# Patient Record
Sex: MALE | Race: WHITE | Employment: UNEMPLOYED | URBAN - METROPOLITAN AREA
[De-identification: names, ages, dates, MRNs, and addresses within clinical notes are randomized per-mention and may not be internally consistent; named-entity substitution may affect disease eponyms.]

---

## 2017-05-04 ENCOUNTER — APPOINTMENT (OUTPATIENT)
Dept: GENERAL RADIOLOGY | Age: 57
End: 2017-05-04
Attending: EMERGENCY MEDICINE
Payer: MEDICAID

## 2017-05-04 ENCOUNTER — HOSPITAL ENCOUNTER (EMERGENCY)
Age: 57
Discharge: HOME | End: 2017-05-04
Attending: EMERGENCY MEDICINE
Payer: MEDICAID

## 2017-05-04 VITALS
HEIGHT: 67 IN | BODY MASS INDEX: 26.68 KG/M2 | OXYGEN SATURATION: 99 % | SYSTOLIC BLOOD PRESSURE: 108 MMHG | TEMPERATURE: 98.1 F | HEART RATE: 65 BPM | RESPIRATION RATE: 18 BRPM | DIASTOLIC BLOOD PRESSURE: 69 MMHG | WEIGHT: 170 LBS

## 2017-05-04 DIAGNOSIS — M25.471 EDEMA OF RIGHT ANKLE: Primary | ICD-10-CM

## 2017-05-04 PROCEDURE — 73630 X-RAY EXAM OF FOOT: CPT

## 2017-05-04 PROCEDURE — 99283 EMERGENCY DEPT VISIT LOW MDM: CPT

## 2017-05-04 PROCEDURE — 73610 X-RAY EXAM OF ANKLE: CPT

## 2017-05-04 RX ORDER — CEPHALEXIN 500 MG/1
500 CAPSULE ORAL 2 TIMES DAILY
COMMUNITY
End: 2017-06-17

## 2017-05-04 NOTE — DISCHARGE INSTRUCTIONS
Foot Pain: Care Instructions  Your Care Instructions  Foot injuries that cause pain and swelling are fairly common. Almost all sports or home repair projects can cause a misstep that ends up as foot pain. Normal wear and tear, especially as you get older, also can cause foot pain. Most minor foot injuries will heal on their own, and home treatment is usually all you need to do. If you have a severe injury, you may need tests and treatment. Follow-up care is a key part of your treatment and safety. Be sure to make and go to all appointments, and call your doctor if you are having problems. Its also a good idea to know your test results and keep a list of the medicines you take. How can you care for yourself at home? · Take pain medicines exactly as directed. ¨ If the doctor gave you a prescription medicine for pain, take it as prescribed. ¨ If you are not taking a prescription pain medicine, ask your doctor if you can take an over-the-counter medicine. · Rest and protect your foot. Take a break from any activity that may cause pain. · Put ice or a cold pack on your foot for 10 to 20 minutes at a time. Put a thin cloth between the ice and your skin. · Prop up the sore foot on a pillow when you ice it or anytime you sit or lie down during the next 3 days. Try to keep it above the level of your heart. This will help reduce swelling. · Your doctor may recommend that you wrap your foot with an elastic bandage. Keep your foot wrapped for as long as your doctor advises. · If your doctor recommends crutches, use them as directed. · Wear roomy footwear. · As soon as pain and swelling end, begin gentle exercises of your foot. Your doctor can tell you which exercises will help. When should you call for help? Call 911 anytime you think you may need emergency care. For example, call if:  · Your foot turns pale, white, blue, or cold.   Call your doctor now or seek immediate medical care if:  · You cannot move or stand on your foot. · Your foot looks twisted or out of its normal position. · Your foot is not stable when you step down. · You have signs of infection, such as:  ¨ Increased pain, swelling, warmth, or redness. ¨ Red streaks leading from the sore area. ¨ Pus draining from a place on your foot. ¨ A fever. · Your foot is numb or tingly. Watch closely for changes in your health, and be sure to contact your doctor if:  · You do not get better as expected. · You have bruises from an injury that last longer than 2 weeks. Where can you learn more? Go to http://negrita-edie.info/. Enter C597 in the search box to learn more about \"Foot Pain: Care Instructions. \"  Current as of: May 23, 2016  Content Version: 11.2  © 9224-9850 [employer]. Care instructions adapted under license by [employer] (which disclaims liability or warranty for this information). If you have questions about a medical condition or this instruction, always ask your healthcare professional. [place] [address] any warranty or liability for your use of this information. We hope that we have addressed all of your medical concerns. The examination and treatment you received in the Emergency Department were for an emergent problem and were not intended as complete care. It is important that you follow up with your healthcare provider(s) for ongoing care. If your symptoms worsen or do not improve as expected, and you are unable to reach your usual health care provider(s), you should return to the Emergency Department. Today's healthcare is undergoing tremendous change, and patient satisfaction surveys are one of the many tools to assess the quality of medical care. You may receive a survey from the [employer] regarding your experience in the Emergency Department.   I hope that your experience has been completely positive, particularly the medical care that I provided. As such, please participate in the survey; anything less than excellent does not meet my expectations or intentions. [facility] and [facility] participate in nationally recognized quality of care measures. If your blood pressure is greater than 120/80, as reported below, we urge that you seek medical care to address the potential of high blood pressure, commonly known as hypertension. Hypertension can be hereditary or can be caused by certain medical conditions, pain, stress, or \"white coat syndrome. \"       Please make an appointment with your health care provider(s) for follow up of your Emergency Department visit. VITALS:   Patient Vitals for the past 8 hrs:   Temp Pulse Resp BP SpO2   05/04/17 1018 98.1 °F (36.7 °C) 65 18 108/69 99 %          Thank you for allowing us to provide you with medical care today. We realize that you have many choices for your emergency care needs. Please choose us in the future for any continued health care needs. [facility], [address]: [phone]            No results found for this or any previous visit (from the past 24 hour(s)). Xr Ankle Rt Min 3 V    Result Date: 5/4/2017  EXAM:  XR ANKLE RT MIN 3 V INDICATION:  fall, swelling. Right ankle injury COMPARISON: 5/11/2014. FINDINGS: Three views of the right ankle demonstrate no fracture or disruption of the ankle mortise. There is no other acute osseous or articular abnormality. The soft tissues are within normal limits. IMPRESSION: No acute abnormality. Xr Foot Rt Min 3 V    Result Date: 5/4/2017  EXAM:  XR FOOT RT MIN 3 V INDICATION:   foot pain and swelling after fall. COMPARISON:  8/30/2016 FINDINGS:  Three views of the right foot demonstrate no fracture or other acute osseous or articular abnormality. There is generalized soft tissue swelling.  There are no radiopaque foreign bodies. IMPRESSION:  No acute abnormality.

## 2017-05-04 NOTE — ED PROVIDER NOTES
Patient is a 60 y.o. male presenting with foot injury. Foot Injury       59 yo WM presents with caregiver after fall last week (4/27). Was seen at [facility], had xrays and all normal. Followed up with PCP on 5/1 and started on antibiotic for prophylaxis for infection from wounds (keflex). C/o swelling to right foot, noticed today. Pt is not able to communicate symptoms of pain. Walking like normal, doesn't seem to have pain in right ankle/foot. No other complaints. Past Medical History:   Diagnosis Date    Developmental delay     Endocrine disease     Neurological disorder     CP    Psychiatric disorder     Seizures ([address]        History reviewed. No pertinent surgical history. History reviewed. No pertinent family history. Social History     Social History    Marital status: SINGLE     Spouse name: N/A    Number of children: N/A    Years of education: N/A     Occupational History    Not on file. Social History Main Topics    Smoking status: Never Smoker    Smokeless tobacco: Not on file    Alcohol use No    Drug use: Yes     Special: Prescription    Sexual activity: Not on file     Other Topics Concern    Not on file     Social History Narrative         ALLERGIES: Review of patient's allergies indicates no known allergies. Review of Systems   Constitutional: Negative for chills and fever. Respiratory: Negative for cough and shortness of breath. Gastrointestinal: Negative for diarrhea and vomiting. Musculoskeletal: Positive for joint swelling. Skin: Positive for rash and wound. All other systems reviewed and are negative.       Vitals:    05/04/17 1018   BP: 108/69   Pulse: 65   Resp: 18   Temp: 98.1 °F (36.7 °C)   SpO2: 99%   Weight: 77.1 kg (170 lb)   Height: 5' 7\" (1.702 m)            Physical Exam   Physical Examination: General appearance - alert, baseline mental status, nonverbal  Eyes - pupils equal and reactive, extraocular eye movements intact  HEENT-bruising to chin  Neck - supple, no significant adenopathy  Chest - clear to auscultation, no wheezes, rales or rhonchi, symmetric air entry  Heart - normal rate, regular rhythm, normal S1, S2, no murmurs, rubs, clicks or gallops  Abdomen - soft, nontender, nondistended, no masses or organomegaly  Back exam - full range of motion, no tenderness, palpable spasm or pain on motion  Neurological - alert, baseline mental status, nonverbal  Musculoskeletal - no joint tenderness, deformity or swelling, knee stable, mild swelling to right ankle, no erythema or tenderness, full rom  Extremities - peripheral pulses normal, no pedal edema, no clubbing or cyanosis  Skin - normal coloration and turgor, no rashes, no suspicious skin lesions noted, well healing skin tear to right anterior knee with dressing in place, c/d/i, no erythema or drainage  MDM  Number of Diagnoses or Management Options  Edema of right ankle:      Amount and/or Complexity of Data Reviewed  Tests in the radiology section of CPT®: ordered and reviewed  Decide to obtain previous medical records or to obtain history from someone other than the patient: yes  Obtain history from someone other than the patient: yes (caregiver)  Review and summarize past medical records: yes  Independent visualization of images, tracings, or specimens: yes    Patient Progress  Patient progress: improved    ED Course       Procedures    Pt with recent knee injury (xray from [facility] normal), now a with nontender right ankle swelling. No calf swelling or tenderness Suspect dependent edema. VSS. Will d/c with pcp f/u.

## 2017-05-04 NOTE — ED TRIAGE NOTES
Per caretaker pt has history of falls and had a significant fall on 4/27. Pt sent at [place] [address] ED with right knee pain and chin abrasion. The pt's exam and xrays were neg. On 5/1 pt went to pcp for right foot swelling and was put on Keflex for precautions. Sent for re eval of right foot from group home.

## 2017-05-17 ENCOUNTER — OFFICE VISIT (OUTPATIENT)
Dept: NEUROLOGY | Age: 57
End: 2017-05-17

## 2017-05-17 VITALS
DIASTOLIC BLOOD PRESSURE: 70 MMHG | HEIGHT: 67 IN | SYSTOLIC BLOOD PRESSURE: 120 MMHG | RESPIRATION RATE: 20 BRPM | WEIGHT: 170 LBS | BODY MASS INDEX: 26.68 KG/M2

## 2017-05-17 DIAGNOSIS — G40.209 PARTIAL EPILEPSY WITH IMPAIRMENT OF CONSCIOUSNESS (HCC): Primary | ICD-10-CM

## 2017-05-17 RX ORDER — DIVALPROEX SODIUM 500 MG/1
TABLET, EXTENDED RELEASE ORAL
Qty: 90 TAB | Refills: 5 | Status: SHIPPED | OUTPATIENT
Start: 2017-05-17 | End: 2017-11-15 | Stop reason: SDUPTHER

## 2017-05-17 RX ORDER — CARBAMAZEPINE 200 MG/1
TABLET, EXTENDED RELEASE ORAL
Qty: 60 TAB | Refills: 5 | Status: SHIPPED | OUTPATIENT
Start: 2017-05-17 | End: 2017-11-15 | Stop reason: SDUPTHER

## 2017-05-17 NOTE — MR AVS SNAPSHOT
Visit Information Date & Time Provider Department Dept. Phone Encounter #  
 5/17/2017  9:00 AM Frank Thompson NP Neurology [facility] [phone] [id number] Follow-up Instructions Return in about 6 months (around 11/17/2017). Upcoming Health Maintenance Date Due Hepatitis C Screening 1960 FOBT Q 1 YEAR AGE 50-75 7/8/2008 INFLUENZA AGE 9 TO ADULT 8/1/2017 DTaP/Tdap/Td series (2 - Td) 1/13/2026 Allergies as of 5/17/2017  Review Complete On: 5/17/2017 By: Fara Cast LPN No Known Allergies Current Immunizations  Never Reviewed Name Date Tdap 1/13/2016  5:39 PM  
  
 Not reviewed this visit You Were Diagnosed With   
  
 Codes Comments Partial epilepsy with impairment of consciousness ([facility]    -  Primary ICD-10-CM: B95.326 ICD-9-CM: 345.40 Vitals Height(growth percentile) Weight(growth percentile) BMI Smoking Status 5' 7\" (1.702 m) 170 lb (77.1 kg) 26.63 kg/m2 Never Smoker BMI and BSA Data Body Mass Index Body Surface Area  
 26.63 kg/m 2 1.91 m 2 Preferred Pharmacy Pharmacy Name Phone [facility], [address] [phone] Your Updated Medication List  
  
   
This list is accurate as of: 5/17/17  9:24 AM.  Always use your most recent med list.  
  
  
  
  
 carBAMazepine  mg SR tablet Commonly known as:  TEGretol XR  
TAKE ONE TABLET TWICE A DAY *BRAND PREF*  
  
 cephALEXin 500 mg capsule Commonly known as:  Linda Chance Take 500 mg by mouth two (2) times a day. CETAPHIL topical cream  
Generic drug:  cetaphil Apply  to affected area as needed for Dry Skin. cholecalciferol 400 unit Tab tablet Commonly known as:  VITAMIN D3 Take  by mouth daily. clobetasol 0.05 % lotion Commonly known as:  Williemae Dove Apply  to affected area two (2) times a day. DEPAKOTE  mg ER tablet Generic drug:  divalproex ER  
TAKE THREE (3) TABLETS DAILY *BRAND MEDICALLY NECESSARY*  
  
 ENBREL 50 mg/mL (0.98 mL) injection Generic drug:  etanercept  
by SubCUTAneous route. fluvoxaMINE 50 mg tablet Commonly known as:  Almeta Honer Take  by mouth two (2) times a day. hydrocortisone valerate 0.2 % topical cream  
Commonly known as:  Coca-Cola Apply  to affected area two (2) times a day. use thin layer Iron 325 mg (65 mg iron) tablet Generic drug:  ferrous sulfate Take  by mouth Daily (before breakfast). levothyroxine 75 mcg tablet Commonly known as:  SYNTHROID Take  by mouth Daily (before breakfast). lisinopril 5 mg tablet Commonly known as:  Karene Bolls Take  by mouth daily. loratadine 10 mg tablet Commonly known as:  Leslie Alexis Take 10 mg by mouth daily. MIRALAX 17 gram packet Generic drug:  polyethylene glycol Take 17 g by mouth daily. * RisperDAL 2 mg tablet Generic drug:  risperiDONE Take 2 mg by mouth daily. Take 1 tab in the am and 2 tabs pm  
  
 * RisperDAL 4 mg tablet Generic drug:  risperiDONE Take  by mouth. Senna 8.6 mg Cap Generic drug:  sennosides Take  by mouth. simvastatin 40 mg tablet Commonly known as:  ZOCOR Take  by mouth nightly. therapeutic multivitamin tablet Commonly known as:  [facility] Take 1 Tab by mouth daily. tiZANidine 2 mg capsule Commonly known as:  Jose R [place] Take 2 mg by mouth three (3) times daily. TOVIAZ 4 mg SR tablet Generic drug:  Fesoterodine Take 8 mg by mouth daily. * Notice: This list has 2 medication(s) that are the same as other medications prescribed for you. Read the directions carefully, and ask your doctor or other care provider to review them with you. Prescriptions Sent to Pharmacy Refills  
 carBAMazepine XR (TEGRETOL XR) 200 mg SR tablet 5 Sig: TAKE ONE TABLET TWICE A DAY *BRAND PREF*  Class: Normal  
 Pharmacy: [address] [place] [address], [address] [place] [address] [facility] Ph #: [phone] DEPAKOTE  mg ER tablet 5 Sig: TAKE THREE (3) TABLETS DAILY *BRAND MEDICALLY NECESSARY* Class: Normal  
 Pharmacy: [address] [place] [address], [address] Ph #: [phone] Follow-up Instructions Return in about 6 months (around 11/17/2017). Patient Instructions PRESCRIPTION REFILL POLICY [facility] Statement to Patients April 1, 2014 In an effort to ensure the large volume of patient prescription refills is processed in the most efficient and expeditious manner, we are asking our patients to assist us by calling your Pharmacy for all prescription refills, this will include also your  Mail Order Pharmacy. The pharmacy will contact our office electronically to continue the refill process. Please do not wait until the last minute to call your pharmacy. We need at least 48 hours (2days) to fill prescriptions. We also encourage you to call your pharmacy before going to  your prescription to make sure it is ready. With regard to controlled substance prescription refill requests (narcotic refills) that need to be picked up at our office, we ask your cooperation by providing us with at least 72 hours (3days) notice that you will need a refill. We will not refill narcotic prescription refill requests after 4:00pm on any weekday, Monday through Thursday, or after 2:00pm on Fridays, or on the weekends. We encourage everyone to explore another way of getting your prescription refill request processed using [employer], our patient web portal through our electronic medical record system. [employer] is an efficient and effective way to communicate your medication request directly to the office and  downloadable as an madonna on your smart phone .  R[employer]mir also features a review functionality that allows you to view your medication list as well as leave messages for your physician. Are you ready to get connected? If so please review the attatched instructions or speak to any of our staff to get you set up right away! Thank you so much for your cooperation. Should you have any questions please contact our Practice Administrator. The Physicians and Staff,  [facility] [address] What is a living will? A living will is a legal form you use to write down the kind of care you want at the end of your life. It is used by the health professionals who will treat you if you aren't able to decide for yourself. If you put your wishes in writing, your loved ones and others will know what kind of care you want. They won't need to guess. This can ease your mind and be helpful to others. A living will is not the same as an estate or property will. An estate will explains what you want to happen with your money and property after you die. Is a living will a legal document? A living will is a legal document. Each state has its own laws about living martinez. If you move to another state, make sure that your living will is legal in the state where you now live. Or you might use a universal form that has been approved by many states. This kind of form can sometimes be completed and stored online. Your electronic copy will then be available wherever you have a connection to the Internet. In most cases, doctors will respect your wishes even if you have a form from a different state. · You don't need an  to complete a living will. But legal advice can be helpful if your state's laws are unclear, your health history is complicated, or your family can't agree on what should be in your living will. · You can change your living will at any time. Some people find that their wishes about end-of-life care change as their health changes. · In addition to making a living will, think about completing a medical power of  form. This form lets you name the person you want to make end-of-life treatment decisions for you (your \"health care agent\") if you're not able to. Many hospitals and nursing homes will give you the forms you need to complete a living will and a medical power of . · Your living will is used only if you can't make or communicate decisions for yourself anymore. If you become able to make decisions again, you can accept or refuse any treatment, no matter what you wrote in your living will. · Your state may offer an online registry. This is a place where you can store your living will online so the doctors and nurses who need to treat you can find it right away. What should you think about when creating a living will? Talk about your end-of-life wishes with your family members and your doctor. Let them know what you want. That way the people making decisions for you won't be surprised by your choices. Think about these questions as you make your living will: · Do you know enough about life support methods that might be used? If not, talk to your doctor so you know what might be done if you can't breathe on your own, your heart stops, or you're unable to swallow. · What things would you still want to be able to do after you receive life-support methods? Would you want to be able to walk? To speak? To eat on your own? To live without the help of machines? · If you have a choice, where do you want to be cared for? In your home? At a hospital or nursing home? · Do you want certain Christian practices performed if you become very ill? · If you have a choice at the end of your life, where would you prefer to die? At home? In a hospital or nursing home? Somewhere else? · Would you prefer to be buried or cremated? · Do you want your organs to be donated after you die? What should you do with your living will? · Make sure that your family members and your health care agent have copies of your living will. · Give your doctor a copy of your living will to keep in your medical record. If you have more than one doctor, make sure that each one has a copy. · You may want to put a copy of your living will where it can be easily found. Where can you learn more? Go to http://negrita-edie.info/. Enter O659 in the search box to learn more about \"Learning About Living [employer]. \" Current as of: February 24, 2016 Content Version: 11.2 © 2300-8224 [employer]. Care instructions adapted under license by [employer] (which disclaims liability or warranty for this information). If you have questions about a medical condition or this instruction, always ask your healthcare professional. [address] any warranty or liability for your use of this information. Advance Directives: Care Instructions Your Care Instructions An advance directive is a legal way to state your wishes at the end of your life. It tells your family and your doctor what to do if you can no longer say what you want. There are two main types of advance directives. You can change them any time that your wishes change. · A living will tells your family and your doctor your wishes about life support and other treatment. · A durable power of  for health care lets you name a person to make treatment decisions for you when you can't speak for yourself. This person is called a health care agent. If you do not have an advance directive, decisions about your medical care may be made by a doctor or a  who doesn't know you. It may help to think of an advance directive as a gift to the people who care for you. If you have one, they won't have to make tough decisions by themselves. Follow-up care is a key part of your treatment and safety.  Be sure to make and go to all appointments, and call your doctor if you are having problems. It's also a good idea to know your test results and keep a list of the medicines you take. How can you care for yourself at home? · Discuss your wishes with your loved ones and your doctor. This way, there are no surprises. · Many states have a unique form. Or you might use a universal form that has been approved by many states. This kind of form can sometimes be completed and stored online. Your electronic copy will then be available wherever you have a connection to the Internet. In most cases, doctors will respect your wishes even if you have a form from a different state. · You don't need a  to do an advance directive. But you may want to get legal advice. · Think about these questions when you prepare an advance directive: ¨ Who do you want to make decisions about your medical care if you are not able to? Many people choose a family member or close friend. ¨ Do you know enough about life support methods that might be used? If not, talk to your doctor so you understand. ¨ What are you most afraid of that might happen? You might be afraid of having pain, losing your independence, or being kept alive by machines. ¨ Where would you prefer to die? Choices include your home, a hospital, or a nursing home. ¨ Would you like to have information about hospice care to support you and your family? ¨ Do you want to donate organs when you die? ¨ Do you want certain Congregational practices performed before you die? If so, put your wishes in the advance directive. · Read your advance directive every year, and make changes as needed. When should you call for help? Be sure to contact your doctor if you have any questions. Where can you learn more? Go to http://negrita-edie.info/. Enter R264 in the search box to learn more about \"Advance Directives: Care Instructions. \" Current as of: November 17, 2016 Content Version: 11.2 © 5543-0184 [employer]. Care instructions adapted under license by [employer] (which disclaims liability or warranty for this information). If you have questions about a medical condition or this instruction, always ask your healthcare professional. [address] any warranty or liability for your use of this information. Introducing [facility]! Moi Briseno introduces [employer] patient portal. Now you can access parts of your medical record, email your doctor's office, and request medication refills online. 1. In your internet browser, go to [email or web address]. [email or web address] 2. Click on the First Time User? Click Here link in the Sign In box. You will see the New Member Sign Up page. 3. Enter your He[employer]arium Access Code exactly as it appears below. You will not need to use this code after youve completed the sign-up process. If you do not sign up before the expiration date, you must request a new code. · He[employer] Access Code: [email or web address] Expires: 8/2/2017 11:10 AM 
 
4. Enter the last four digits of your Social Security Number (xxxx) and Date of Birth (mm/dd/yyyy) as indicated and click Submit. You will be taken to the next sign-up page. 5. Create a Healarium ID. This will be your He[employer]arium login ID and cannot be changed, so think of one that is secure and easy to remember. 6. Create a [employer] password. You can change your password at any time. 7. Enter your Password Reset Question and Answer. This can be used at a later time if you forget your password. 8. Enter your e-mail address. You will receive e-mail notification when new information is available in [address] 9. Click Sign Up. You can now view and download portions of your medical record. 10. Click the Download Summary menu link to download a portable copy of your medical information.  
 
If you have questions, please visit the Frequently Asked Questions section of the "[employer]. Remember, [employer] is NOT to be used for urgent needs. For medical emergencies, dial 911. Now available from your iPhone and Android! Please provide this summary of care documentation to your next provider. Your primary care clinician is listed as Sana Lau. If you have any questions after today's visit, please call [phone].

## 2017-05-17 NOTE — PATIENT INSTRUCTIONS
[facility]   Statement to Patients  April 1, 2014      In an effort to ensure the large volume of patient prescription refills is processed in the most efficient and expeditious manner, we are asking our patients to assist us by calling your Pharmacy for all prescription refills, this will include also your  Mail Order Pharmacy. The pharmacy will contact our office electronically to continue the refill process. Please do not wait until the last minute to call your pharmacy. We need at least 48 hours (2days) to fill prescriptions. We also encourage you to call your pharmacy before going to  your prescription to make sure it is ready. With regard to controlled substance prescription refill requests (narcotic refills) that need to be picked up at our office, we ask your cooperation by providing us with at least 72 hours (3days) notice that you will need a refill. We will not refill narcotic prescription refill requests after 4:00pm on any weekday, Monday through Thursday, or after 2:00pm on Fridays, or on the weekends. We encourage everyone to explore another way of getting your prescription refill request processed using Solasta, our patient web portal through our electronic medical record system. Solasta is an efficient and effective way to communicate your medication request directly to the office and  downloadable as an madonna on your smart phone . Solasta also features a review functionality that allows you to view your medication list as well as leave messages for your physician. Are you ready to get connected? If so please review the attatched instructions or speak to any of our staff to get you set up right away! Thank you so much for your cooperation. Should you have any questions please contact our Practice Administrator. The Physicians and Staff,  [facility] [address]  What is a living will?   A living will is a legal form you use to write down the kind of care you want at the end of your life. It is used by the health professionals who will treat you if you aren't able to decide for yourself. If you put your wishes in writing, your loved ones and others will know what kind of care you want. They won't need to guess. This can ease your mind and be helpful to others. A living will is not the same as an estate or property will. An estate will explains what you want to happen with your money and property after you die. Is a living will a legal document? A living will is a legal document. Each state has its own laws about living martinez. If you move to another state, make sure that your living will is legal in the state where you now live. Or you might use a universal form that has been approved by many states. This kind of form can sometimes be completed and stored online. Your electronic copy will then be available wherever you have a connection to the Internet. In most cases, doctors will respect your wishes even if you have a form from a different state. · You don't need an  to complete a living will. But legal advice can be helpful if your state's laws are unclear, your health history is complicated, or your family can't agree on what should be in your living will. · You can change your living will at any time. Some people find that their wishes about end-of-life care change as their health changes. · In addition to making a living will, think about completing a medical power of  form. This form lets you name the person you want to make end-of-life treatment decisions for you (your \"health care agent\") if you're not able to. Many hospitals and nursing homes will give you the forms you need to complete a living will and a medical power of . · Your living will is used only if you can't make or communicate decisions for yourself anymore.  If you become able to make decisions again, you can accept or refuse any treatment, no matter what you wrote in your living will. · Your state may offer an online registry. This is a place where you can store your living will online so the doctors and nurses who need to treat you can find it right away. What should you think about when creating a living will? Talk about your end-of-life wishes with your family members and your doctor. Let them know what you want. That way the people making decisions for you won't be surprised by your choices. Think about these questions as you make your living will:  · Do you know enough about life support methods that might be used? If not, talk to your doctor so you know what might be done if you can't breathe on your own, your heart stops, or you're unable to swallow. · What things would you still want to be able to do after you receive life-support methods? Would you want to be able to walk? To speak? To eat on your own? To live without the help of machines? · If you have a choice, where do you want to be cared for? In your home? At a hospital or nursing home? · Do you want certain Zoroastrianism practices performed if you become very ill? · If you have a choice at the end of your life, where would you prefer to die? At home? In a hospital or nursing home? Somewhere else? · Would you prefer to be buried or cremated? · Do you want your organs to be donated after you die? What should you do with your living will? · Make sure that your family members and your health care agent have copies of your living will. · Give your doctor a copy of your living will to keep in your medical record. If you have more than one doctor, make sure that each one has a copy. · You may want to put a copy of your living will where it can be easily found. Where can you learn more? Go to http://negrita-edie.info/. Enter V374 in the search box to learn more about \"Learning About Living Perpinky. \"  Current as of: February 24, 2016  Content Version: 11.2  © 8104-4958 [employer]. Care instructions adapted under license by [employer] (which disclaims liability or warranty for this information). If you have questions about a medical condition or this instruction, always ask your healthcare professional. [facility] [address] any warranty or liability for your use of this information. Advance Directives: Care Instructions  Your Care Instructions  An advance directive is a legal way to state your wishes at the end of your life. It tells your family and your doctor what to do if you can no longer say what you want. There are two main types of advance directives. You can change them any time that your wishes change. · A living will tells your family and your doctor your wishes about life support and other treatment. · A durable power of  for health care lets you name a person to make treatment decisions for you when you can't speak for yourself. This person is called a health care agent. If you do not have an advance directive, decisions about your medical care may be made by a doctor or a  who doesn't know you. It may help to think of an advance directive as a gift to the people who care for you. If you have one, they won't have to make tough decisions by themselves. Follow-up care is a key part of your treatment and safety. Be sure to make and go to all appointments, and call your doctor if you are having problems. It's also a good idea to know your test results and keep a list of the medicines you take. How can you care for yourself at home? · Discuss your wishes with your loved ones and your doctor. This way, there are no surprises. · Many states have a unique form. Or you might use a universal form that has been approved by many states. This kind of form can sometimes be completed and stored online.  Your electronic copy will then be available wherever you have a connection to the Internet. In most cases, doctors will respect your wishes even if you have a form from a different state. · You don't need a  to do an advance directive. But you may want to get legal advice. · Think about these questions when you prepare an advance directive:  ¨ Who do you want to make decisions about your medical care if you are not able to? Many people choose a family member or close friend. ¨ Do you know enough about life support methods that might be used? If not, talk to your doctor so you understand. ¨ What are you most afraid of that might happen? You might be afraid of having pain, losing your independence, or being kept alive by machines. ¨ Where would you prefer to die? Choices include your home, a hospital, or a nursing home. ¨ Would you like to have information about hospice care to support you and your family? ¨ Do you want to donate organs when you die? ¨ Do you want certain Yazidi practices performed before you die? If so, put your wishes in the advance directive. · Read your advance directive every year, and make changes as needed. When should you call for help? Be sure to contact your doctor if you have any questions. Where can you learn more? Go to http://negrita-edie.info/. Enter R264 in the search box to learn more about \"Advance Directives: Care Instructions. \"  Current as of: November 17, 2016  Content Version: 11.2  © 9029-6916 [employer]. Care instructions adapted under license by [employer] (which disclaims liability or warranty for this information). If you have questions about a medical condition or this instruction, always ask your healthcare professional. [address] any warranty or liability for your use of this information.

## 2017-05-17 NOTE — PROGRESS NOTES
Adrian Quach is a 60 y.o. male who presents with the following  Chief Complaint   Patient presents with    Seizure    Tremors       HPI Patient comes in for a follow up for seizures and tremors. He is doing well since decreasing he is currently on 500 mg ER three tablets daily and Tegretol  mg BID and doing well. Had not noticed any tremors at all since decreasing depakote. Last seizure was 13 years ago. Attends day support and interacting well with other members. He is not having any trouble with eating or sleeping. Aid has not noticed any seizure activity. Taking medications without any problems in applesauce. Doing well. Did fall the other night getting to the bathroom he is walking too fast. Has a right brace on the leg and belt to help keep him slowly walking.        No Known Allergies    Current Outpatient Prescriptions   Medication Sig    carBAMazepine XR (TEGRETOL XR) 200 mg SR tablet TAKE ONE TABLET TWICE A DAY *BRAND PREF*    DEPAKOTE  mg ER tablet TAKE THREE (3) TABLETS DAILY *BRAND MEDICALLY NECESSARY*    cetaphil (CETAPHIL) topical cream Apply  to affected area as needed for Dry Skin.  cephALEXin (KEFLEX) 500 mg capsule Take 500 mg by mouth two (2) times a day.  cholecalciferol (VITAMIN D3) 400 unit tab tablet Take  by mouth daily.  hydrocortisone valerate (WESTCORT) 0.2 % topical cream Apply  to affected area two (2) times a day. use thin layer    clobetasol (CLOBEX) 0.05 % lotion Apply  to affected area two (2) times a day.  sennosides (SENNA) 8.6 mg cap Take  by mouth.  loratadine (CLARITIN) 10 mg tablet Take 10 mg by mouth daily.  Fesoterodine (TOVIAZ) 4 mg SR tablet Take 8 mg by mouth daily.  polyethylene glycol (MIRALAX) 17 gram packet Take 17 g by mouth daily.  ferrous sulfate (IRON) 325 mg (65 mg iron) tablet Take  by mouth Daily (before breakfast).  risperiDONE (RISPERDAL) 2 mg tablet Take 2 mg by mouth daily.  Take 1 tab in the am and 2 tabs pm  simvastatin (ZOCOR) 40 mg tablet Take  by mouth nightly.  levothyroxine (SYNTHROID) 75 mcg tablet Take  by mouth Daily (before breakfast).  lisinopril (PRINIVIL, ZESTRIL) 5 mg tablet Take  by mouth daily.  therapeutic multivitamin (THERAGRAN) tablet Take 1 Tab by mouth daily.  tiZANidine (ZANAFLEX) 2 mg capsule Take 2 mg by mouth three (3) times daily.  fluvoxaMINE (LUVOX) 50 mg tablet Take  by mouth two (2) times a day.  risperiDONE (RISPERDAL) 4 mg tablet Take  by mouth.  etanercept (ENBREL) 50 mg/mL (0.98 mL) injection by SubCUTAneous route. No current facility-administered medications for this visit. History   Smoking Status    Never Smoker   Smokeless Tobacco    Never Used       Past Medical History:   Diagnosis Date    Developmental delay     Endocrine disease     Neurological disorder     CP    Psychiatric disorder     Seizures ([facility] [address]        History reviewed. No pertinent surgical history. History reviewed. No pertinent family history. Social History     Social History    Marital status: SINGLE     Spouse name: N/A    Number of children: N/A    Years of education: N/A     Social History Main Topics    Smoking status: Never Smoker    Smokeless tobacco: Never Used    Alcohol use No    Drug use: Yes     Special: Prescription    Sexual activity: Not Asked     Other Topics Concern    None     Social History Narrative       Review of Systems   HENT: Negative for hearing loss. Cardiovascular: Negative for chest pain and palpitations. Gastrointestinal: Negative for nausea and vomiting. Musculoskeletal: Positive for falls. Neurological: Negative for seizures, loss of consciousness, weakness and headaches. Psychiatric/Behavioral: Negative for memory loss. The patient does not have insomnia. Remainder of comprehensive review is negative.      Physical Exam :    Visit Vitals    /70    Resp 20    Ht 5' 7\" (1.702 m)    Wt 77.1 kg (170 lb)    BMI 26.63 kg/m2     General: Well defined, nourished, and groomed individual in no acute distress.    Neck: Supple, nontender, no bruits, no pain with resistance to active range of motion.    Heart: Regular rate and rhythm, no murmurs, rub, or gallop. Normal S1S2. Lungs: Clear to auscultation bilaterally with equal chest expansion, no cough, no wheeze  Musculoskeletal: Extremities revealed no edema and had full range of motion of joints.    Psych: unable to assess      NEUROLOGICAL EXAMINATION:    Mental Status: unable to assess      Cranial Nerves:    II, III, IV, VI: did not track due to mental status. Pupils are equal, round, and reactive to light and accommodation.        V-XII: Hearing is grossly intact. .      Motor Examination: will not perform      Coordination:would not perform      Gait and Station: Steady while walking     Reflexes: DTRs 2+ throughout. Results for orders placed or performed in visit on 10/30/15   AMB EXT LDL-C   Result Value Ref Range    LDL-C, External 62    AMB EXT HGBA1C   Result Value Ref Range    Hemoglobin A1c, External 5.7    AMB EXT LDL-C   Result Value Ref Range    LDL-C, External 61    AMB EXT CREATININE   Result Value Ref Range    Creatinine, External 0.65    AMB EXT HGBA1C   Result Value Ref Range    Hemoglobin A1c, External 5.5    AMB EXT CREATININE   Result Value Ref Range    Creatinine, External 0.59    AMB EXT CREATININE   Result Value Ref Range    Creatinine, External 0.70        Orders Placed This Encounter    carBAMazepine XR (TEGRETOL XR) 200 mg SR tablet     Sig: TAKE ONE TABLET TWICE A DAY *BRAND PREF*     Dispense:  60 Tab     Refill:  5    DEPAKOTE  mg ER tablet     Sig: TAKE THREE (3) TABLETS DAILY *BRAND MEDICALLY NECESSARY*     Dispense:  90 Tab     Refill:  5       1. Partial epilepsy with impairment of consciousness ([facility] [address]        Follow-up Disposition:  Return in about 6 months (around 11/17/2017).       Seizures doing well and stable. Continue Tegretol and Depakote at current dosing as he is doing well. He has not had a seizure in 13 years. Continue to stay active and busy. Call with any issues.  Tremors gone       This note will not be viewable in [facility]

## 2017-06-17 ENCOUNTER — APPOINTMENT (OUTPATIENT)
Dept: CT IMAGING | Age: 57
End: 2017-06-17
Attending: EMERGENCY MEDICINE
Payer: MEDICAID

## 2017-06-17 ENCOUNTER — APPOINTMENT (OUTPATIENT)
Dept: GENERAL RADIOLOGY | Age: 57
End: 2017-06-17
Attending: EMERGENCY MEDICINE
Payer: MEDICAID

## 2017-06-17 ENCOUNTER — HOSPITAL ENCOUNTER (EMERGENCY)
Age: 57
Discharge: ANOTHER HOSPITAL | End: 2017-06-17
Attending: EMERGENCY MEDICINE | Admitting: INTERNAL MEDICINE
Payer: MEDICAID

## 2017-06-17 VITALS
RESPIRATION RATE: 16 BRPM | HEART RATE: 83 BPM | SYSTOLIC BLOOD PRESSURE: 134 MMHG | BODY MASS INDEX: 26.68 KG/M2 | OXYGEN SATURATION: 96 % | WEIGHT: 170 LBS | DIASTOLIC BLOOD PRESSURE: 75 MMHG | TEMPERATURE: 98.3 F | HEIGHT: 67 IN

## 2017-06-17 DIAGNOSIS — S81.011A KNEE LACERATION, RIGHT, INITIAL ENCOUNTER: Primary | ICD-10-CM

## 2017-06-17 DIAGNOSIS — D72.829 LEUKOCYTOSIS, UNSPECIFIED TYPE: ICD-10-CM

## 2017-06-17 PROBLEM — A41.9 SEPSIS (HCC): Status: ACTIVE | Noted: 2017-06-17

## 2017-06-17 PROBLEM — F79 MENTAL RETARDATION: Status: ACTIVE | Noted: 2017-06-17

## 2017-06-17 PROBLEM — N28.9 RENAL INSUFFICIENCY: Status: ACTIVE | Noted: 2017-06-17

## 2017-06-17 PROBLEM — E87.1 HYPONATREMIA: Status: ACTIVE | Noted: 2017-06-17

## 2017-06-17 LAB
ALBUMIN SERPL BCP-MCNC: 3.1 G/DL (ref 3.5–5)
ALBUMIN/GLOB SERPL: 0.7 {RATIO} (ref 1.1–2.2)
ALP SERPL-CCNC: 57 U/L (ref 45–117)
ALT SERPL-CCNC: 25 U/L (ref 12–78)
AMMONIA PLAS-SCNC: 11 UMOL/L
ANION GAP BLD CALC-SCNC: 7 MMOL/L (ref 5–15)
AST SERPL W P-5'-P-CCNC: 72 U/L (ref 15–37)
BASOPHILS # BLD AUTO: 0 K/UL
BASOPHILS # BLD: 0 %
BILIRUB SERPL-MCNC: 0.5 MG/DL (ref 0.2–1)
BUN SERPL-MCNC: 17 MG/DL (ref 6–20)
BUN/CREAT SERPL: 12 (ref 12–20)
CALCIUM SERPL-MCNC: 8.9 MG/DL (ref 8.5–10.1)
CHLORIDE SERPL-SCNC: 95 MMOL/L (ref 97–108)
CO2 SERPL-SCNC: 29 MMOL/L (ref 21–32)
CREAT SERPL-MCNC: 1.37 MG/DL (ref 0.7–1.3)
DIFFERENTIAL METHOD BLD: ABNORMAL
EOSINOPHIL # BLD: 0 K/UL
EOSINOPHIL NFR BLD: 0 %
ERYTHROCYTE [DISTWIDTH] IN BLOOD BY AUTOMATED COUNT: 11.5 % (ref 11.5–14.5)
GLOBULIN SER CALC-MCNC: 4.6 G/DL (ref 2–4)
GLUCOSE SERPL-MCNC: 122 MG/DL (ref 65–100)
HCT VFR BLD AUTO: 37.3 % (ref 36.6–50.3)
HGB BLD-MCNC: 13 G/DL (ref 12.1–17)
LACTATE SERPL-SCNC: 2.1 MMOL/L (ref 0.4–2)
LYMPHOCYTES # BLD AUTO: 0 %
LYMPHOCYTES # BLD: 0 K/UL
MCH RBC QN AUTO: 34.3 PG (ref 26–34)
MCHC RBC AUTO-ENTMCNC: 34.9 G/DL (ref 30–36.5)
MCV RBC AUTO: 98.4 FL (ref 80–99)
MONOCYTES # BLD: 3.2 K/UL
MONOCYTES NFR BLD AUTO: 10 %
NEUTS BAND NFR BLD MANUAL: 7 %
NEUTS SEG # BLD: 28.4 K/UL
NEUTS SEG NFR BLD AUTO: 83 %
PATH REV BLD -IMP: ABNORMAL
PLATELET # BLD AUTO: 193 K/UL (ref 150–400)
POTASSIUM SERPL-SCNC: 4.3 MMOL/L (ref 3.5–5.1)
PROT SERPL-MCNC: 7.7 G/DL (ref 6.4–8.2)
RBC # BLD AUTO: 3.79 M/UL (ref 4.1–5.7)
RBC MORPH BLD: ABNORMAL
SODIUM SERPL-SCNC: 131 MMOL/L (ref 136–145)
TSH SERPL DL<=0.05 MIU/L-ACNC: 2.03 UIU/ML (ref 0.36–3.74)
VALPROATE SERPL-MCNC: 80 UG/ML (ref 50–100)
WBC # BLD AUTO: 31.6 K/UL (ref 4.1–11.1)

## 2017-06-17 PROCEDURE — 80164 ASSAY DIPROPYLACETIC ACD TOT: CPT | Performed by: EMERGENCY MEDICINE

## 2017-06-17 PROCEDURE — 71010 XR CHEST PORT: CPT

## 2017-06-17 PROCEDURE — 77030018836 HC SOL IRR NACL ICUM -A

## 2017-06-17 PROCEDURE — 77030002916 HC SUT ETHLN J&J -A

## 2017-06-17 PROCEDURE — 84443 ASSAY THYROID STIM HORMONE: CPT | Performed by: EMERGENCY MEDICINE

## 2017-06-17 PROCEDURE — 96361 HYDRATE IV INFUSION ADD-ON: CPT

## 2017-06-17 PROCEDURE — 75810000293 HC SIMP/SUPERF WND  RPR

## 2017-06-17 PROCEDURE — 80053 COMPREHEN METABOLIC PANEL: CPT | Performed by: EMERGENCY MEDICINE

## 2017-06-17 PROCEDURE — 73562 X-RAY EXAM OF KNEE 3: CPT

## 2017-06-17 PROCEDURE — 74011000250 HC RX REV CODE- 250: Performed by: EMERGENCY MEDICINE

## 2017-06-17 PROCEDURE — 99284 EMERGENCY DEPT VISIT MOD MDM: CPT

## 2017-06-17 PROCEDURE — 82140 ASSAY OF AMMONIA: CPT | Performed by: EMERGENCY MEDICINE

## 2017-06-17 PROCEDURE — 74011000258 HC RX REV CODE- 258: Performed by: EMERGENCY MEDICINE

## 2017-06-17 PROCEDURE — 96374 THER/PROPH/DIAG INJ IV PUSH: CPT

## 2017-06-17 PROCEDURE — 70450 CT HEAD/BRAIN W/O DYE: CPT

## 2017-06-17 PROCEDURE — 36415 COLL VENOUS BLD VENIPUNCTURE: CPT | Performed by: EMERGENCY MEDICINE

## 2017-06-17 PROCEDURE — 85025 COMPLETE CBC W/AUTO DIFF WBC: CPT | Performed by: EMERGENCY MEDICINE

## 2017-06-17 PROCEDURE — 87040 BLOOD CULTURE FOR BACTERIA: CPT | Performed by: EMERGENCY MEDICINE

## 2017-06-17 PROCEDURE — 83605 ASSAY OF LACTIC ACID: CPT | Performed by: EMERGENCY MEDICINE

## 2017-06-17 PROCEDURE — 74011250636 HC RX REV CODE- 250/636: Performed by: EMERGENCY MEDICINE

## 2017-06-17 RX ORDER — SODIUM CHLORIDE 0.9 % (FLUSH) 0.9 %
5-10 SYRINGE (ML) INJECTION EVERY 8 HOURS
Status: CANCELLED | OUTPATIENT
Start: 2017-06-17

## 2017-06-17 RX ORDER — SODIUM CHLORIDE 0.9 % (FLUSH) 0.9 %
5-10 SYRINGE (ML) INJECTION AS NEEDED
Status: CANCELLED | OUTPATIENT
Start: 2017-06-17

## 2017-06-17 RX ORDER — HEPARIN SODIUM 5000 [USP'U]/ML
5000 INJECTION, SOLUTION INTRAVENOUS; SUBCUTANEOUS EVERY 8 HOURS
Status: CANCELLED | OUTPATIENT
Start: 2017-06-17

## 2017-06-17 RX ORDER — BACITRACIN 500 [USP'U]/G
OINTMENT TOPICAL 3 TIMES DAILY
Qty: 1 TUBE | Refills: 0 | Status: SHIPPED | OUTPATIENT
Start: 2017-06-17 | End: 2017-06-27

## 2017-06-17 RX ORDER — LIDOCAINE HYDROCHLORIDE AND EPINEPHRINE 10; 10 MG/ML; UG/ML
1.5 INJECTION, SOLUTION INFILTRATION; PERINEURAL ONCE
Status: COMPLETED | OUTPATIENT
Start: 2017-06-17 | End: 2017-06-17

## 2017-06-17 RX ORDER — SODIUM CHLORIDE 9 MG/ML
100 INJECTION, SOLUTION INTRAVENOUS CONTINUOUS
Status: CANCELLED | OUTPATIENT
Start: 2017-06-17

## 2017-06-17 RX ORDER — BACITRACIN 500 UNIT/G
1 PACKET (EA) TOPICAL
Status: COMPLETED | OUTPATIENT
Start: 2017-06-17 | End: 2017-06-17

## 2017-06-17 RX ADMIN — SODIUM CHLORIDE 1000 ML: 900 INJECTION, SOLUTION INTRAVENOUS at 09:32

## 2017-06-17 RX ADMIN — PIPERACILLIN SODIUM,TAZOBACTAM SODIUM 3.38 G: 3; .375 INJECTION, POWDER, FOR SOLUTION INTRAVENOUS at 10:59

## 2017-06-17 RX ADMIN — BACITRACIN 1 PACKET: 500 OINTMENT TOPICAL at 08:24

## 2017-06-17 RX ADMIN — LIDOCAINE HYDROCHLORIDE,EPINEPHRINE BITARTRATE 15 MG: 10; .01 INJECTION, SOLUTION INFILTRATION; PERINEURAL at 08:23

## 2017-06-17 RX ADMIN — SODIUM CHLORIDE 1000 ML: 900 INJECTION, SOLUTION INTRAVENOUS at 10:59

## 2017-06-17 NOTE — DISCHARGE INSTRUCTIONS
Cuts Closed With Stitches: Care Instructions  Your Care Instructions  A cut can happen anywhere on your body. The doctor used stitches to close the cut. Using stitches also helps the cut heal and reduces scarring. Sometimes pieces of tape called Steri-Strips are put over the stitches. If the cut went deep and through the skin, the doctor may have put in two layers of stitches. The deeper layer brings the deep part of the cut together. These stitches will dissolve and don't need to be removed. The stitches in the upper layer are the ones you see on the cut. You will probably have a bandage over the stitches. You will need to have the stitches removed, usually in 10 to 14 days. The doctor has checked you carefully, but problems can develop later. If you notice any problems or new symptoms, get medical treatment right away. Follow-up care is a key part of your treatment and safety. Be sure to make and go to all appointments, and call your doctor if you are having problems. It's also a good idea to know your test results and keep a list of the medicines you take. How can you care for yourself at home? · Keep the cut dry for the first 24 to 48 hours. After this, you can shower if your doctor okays it. Pat the cut dry. · Don't soak the cut, such as in a bathtub. Your doctor will tell you when it's safe to get the cut wet. · If your doctor told you how to care for your cut, follow your doctor's instructions. If you did not get instructions, follow this general advice:  ¨ After the first 24 to 48 hours, wash around the cut with clean water 2 times a day. Don't use hydrogen peroxide or alcohol, which can slow healing. ¨ You may cover the cut with a thin layer of petroleum jelly, such as Vaseline, and a nonstick bandage. ¨ Apply more petroleum jelly and replace the bandage as needed. · Prop up the sore area on a pillow anytime you sit or lie down during the next 3 days.  Try to keep it above the level of your heart. This will help reduce swelling. · Avoid any activity that could cause your cut to reopen. · Do not remove the stitches on your own. Your doctor will tell you when to come back to have the stitches removed. · Leave Steri-Strips on until they fall off. · Be safe with medicines. Read and follow all instructions on the label. ¨ If the doctor gave you a prescription medicine for pain, take it as prescribed. ¨ If you are not taking a prescription pain medicine, ask your doctor if you can take an over-the-counter medicine. When should you call for help? Call your doctor now or seek immediate medical care if:  · You have new pain, or your pain gets worse. · The skin near the cut is cold or pale or changes color. · You have tingling, weakness, or numbness near the cut. · The cut starts to bleed, and blood soaks through the bandage. Oozing small amounts of blood is normal.  · You have trouble moving the area near the cut. · You have symptoms of infection, such as:  ¨ Increased pain, swelling, warmth, or redness around the cut. ¨ Red streaks leading from the cut. ¨ Pus draining from the cut. ¨ A fever. Watch closely for changes in your health, and be sure to contact your doctor if:  · The cut reopens. · You do not get better as expected. Where can you learn more? Go to http://negrita-edie.info/. Enter R217 in the search box to learn more about \"Cuts Closed With Stitches: Care Instructions. \"  Current as of: May 27, 2016  Content Version: 11.2  © 8073-9194 "[employer]". Care instructions adapted under license by [employer] (which disclaims liability or warranty for this information). If you have questions about a medical condition or this instruction, always ask your healthcare professional. [address] any warranty or liability for your use of this information. We hope that we have addressed all of your medical concerns.  The examination and treatment you received in the Emergency Department were for an emergent problem and were not intended as complete care. It is important that you follow up with your healthcare provider(s) for ongoing care. If your symptoms worsen or do not improve as expected, and you are unable to reach your usual health care provider(s), you should return to the Emergency Department. Today's healthcare is undergoing tremendous change, and patient satisfaction surveys are one of the many tools to assess the quality of medical care. You may receive a survey from the [employer] organization regarding your experience in the Emergency Department. I hope that your experience has been completely positive, particularly the medical care that I provided. As such, please participate in the survey; anything less than excellent does not meet my expectations or intentions. [facility] and [facility] participate in nationally recognized quality of care measures. If your blood pressure is greater than 120/80, as reported below, we urge that you seek medical care to address the potential of high blood pressure, commonly known as hypertension. Hypertension can be hereditary or can be caused by certain medical conditions, pain, stress, or \"white coat syndrome. \"       Please make an appointment with your health care provider(s) for follow up of your Emergency Department visit. VITALS:   Patient Vitals for the past 8 hrs:   Temp Pulse Resp BP SpO2   06/17/17 0810 98.8 °F (37.1 °C) 93 18 98/55 94 %          Thank you for allowing us to provide you with medical care today. We realize that you have many choices for your emergency care needs. Please choose us in the future for any continued health care needs. Espinoza Dorantes, [address] [place] [address].   Office: [phone]            No results found for this or any previous visit (from the past 24 hour(s)). Xr Knee Rt 3 V    Result Date: 6/17/2017  EXAM:  XR KNEE RT 3 V INDICATION:   Trauma. Fall with skin tear to the lower leg COMPARISON: August 30, 2016. FINDINGS: Three views of the right knee demonstrate no fracture or other acute osseous or articular abnormality. There is no effusion. IMPRESSION:  No acute abnormality.

## 2017-06-17 NOTE — ED NOTES
TRANSFER - OUT REPORT:    Verbal report given to Tee Navarro RN(name) on Elwood Councilman  being transferred to [facility] ED(unit) for routine progression of care       Report consisted of patients Situation, Background, Assessment and   Recommendations(SBAR). Information from the following report(s) SBAR, ED Summary, [facility] and Recent Results was reviewed with the receiving nurse. Lines:   Peripheral IV 06/17/17 Left Forearm (Active)   Site Assessment Clean, dry, & intact 6/17/2017  9:30 AM   Phlebitis Assessment 0 6/17/2017  9:30 AM   Infiltration Assessment 0 6/17/2017  9:30 AM   Dressing Status Clean, dry, & intact 6/17/2017  9:30 AM   Dressing Type Tape;Transparent 6/17/2017  9:30 AM   Hub Color/Line Status Pink;Patent; Flushed 6/17/2017  9:30 AM   Action Taken Blood drawn 6/17/2017  9:30 AM       Peripheral IV 06/17/17 Left Antecubital (Active)   Site Assessment Clean, dry, & intact 6/17/2017 10:46 AM   Phlebitis Assessment 0 6/17/2017 10:46 AM   Infiltration Assessment 0 6/17/2017 10:46 AM   Dressing Status Clean, dry, & intact 6/17/2017 10:46 AM   Dressing Type Tape;Transparent 6/17/2017 10:46 AM   Hub Color/Line Status Pink;Flushed;Patent 6/17/2017 10:46 AM   Action Taken Blood drawn 6/17/2017 10:46 AM        Opportunity for questions and clarification was provided. Patient transported with IV locks.

## 2017-06-17 NOTE — ED PROVIDER NOTES
Patient is a 60 y.o. male presenting with skin laceration. Laceration         59 yo WM presents with fall. Pt is nonverbal resident of group home. Per caregiver, pt fell out of bed this morning and cut his right knee. Pt has been ambulatory on right leg after fall. Denies head injury or LOC. Fall wasn't witnessed but they heard him fall and immediately went into the room. Per caregiver, pt has been more somnolent over the past day. She thinks it's because he has a new roommate. This is not unusual behavior for him and she is not concerned. No recent medication changes. Denies vomiting, diarrhea, fever, cough. Pt has been eating his meals. Past Medical History:   Diagnosis Date    Developmental delay     Endocrine disease     Neurological disorder     CP    Psychiatric disorder     Seizures ([facility] [address]        History reviewed. No pertinent surgical history. History reviewed. No pertinent family history. Social History     Social History    Marital status: SINGLE     Spouse name: N/A    Number of children: N/A    Years of education: N/A     Occupational History    Not on file. Social History Main Topics    Smoking status: Never Smoker    Smokeless tobacco: Never Used    Alcohol use No    Drug use: Yes     Special: Prescription    Sexual activity: Not on file     Other Topics Concern    Not on file     Social History Narrative         ALLERGIES: Review of patient's allergies indicates no known allergies.     Review of Systems   Unable to perform ROS: Patient nonverbal       Vitals:    06/17/17 0810   BP: 98/55   Pulse: 93   Resp: 18   Temp: 98.8 °F (37.1 °C)   SpO2: 94%   Weight: 77.1 kg (170 lb)   Height: 5' 7\" (1.702 m)            Physical Exam   Physical Examination: General appearance - somnolent but arousable to voice  Eyes - pupils equal and reactive, extraocular eye movements intact  HEENT-atraumatic  Neck - supple, no significant adenopathy  Chest - clear to auscultation, no wheezes, rales or rhonchi, symmetric air entry  Heart - normal rate, regular rhythm, normal S1, S2, no murmurs, rubs, clicks or gallops  Abdomen - soft, nontender, nondistended, no masses or organomegaly  Back exam - full range of motion, no tenderness, palpable spasm or pain on motion  Neurological - nonfocal exam, nonverbal, somnolent but arousable to voice, moving all extremities  Musculoskeletal - no joint tenderness, deformity or swelling  Extremities - peripheral pulses normal, no pedal edema, no clubbing or cyanosis  Skin - normal coloration and turgor, no rashes, no suspicious skin lesions noted, several diffuse bruises to extremities, mild petechial rash to left forearm, laceration distal to right knee, full rom of right knee, NVI distally, mild erythema and swelling to right foot-no warmth, skin fully assessed for wounds or abscess-no other wounds seen  MDM  Number of Diagnoses or Management Options  Knee laceration, right, initial encounter:   Leukocytosis, unspecified type:      Amount and/or Complexity of Data Reviewed  Clinical lab tests: ordered and reviewed  Tests in the radiology section of CPT®: ordered and reviewed  Decide to obtain previous medical records or to obtain history from someone other than the patient: yes  Obtain history from someone other than the patient: yes (caregiver)  Review and summarize past medical records: yes  Discuss the patient with other providers: yes (hospitalist)  Independent visualization of images, tracings, or specimens: yes    Critical Care  Total time providing critical care: 30-74 minutes    Patient Progress  Patient progress: stable    ED Course       WOUND REPAIR  Date/Time: 6/17/2017 9:00 AM  Performed by: attendingPreparation: skin prepped with Betadine and sterile field established  Pre-procedure re-eval: Immediately prior to the procedure, the patient was reevaluated and found suitable for the planned procedure and any planned medications.   Time out: Immediately prior to the procedure a time out was called to verify the correct patient, procedure, equipment, staff and marking as appropriate. .  Location details: right leg  Wound length:2.6 - 7.5 cm  Anesthesia: local infiltration    Anesthesia:  Anesthesia: local infiltration  Local Anesthetic: lidocaine 1% with epinephrine   Foreign bodies: no foreign bodies  Irrigation solution: saline  Irrigation method: syringe  Debridement: minimal  Skin closure: 4-0 nylon  Number of sutures: 7  Technique: simple and interrupted  Approximation: close  Dressing: 4x4, antibiotic ointment and pressure dressing  Patient tolerance: Patient tolerated the procedure well with no immediate complications  My total time at bedside, performing this procedure was 16-30 minutes. Caregiver now states pt seems to be more tired than usual. Will check blood work and give IVF. WBC elevated, no clear source of infection. Will get CXR and urine. 11:00 AM  Discussed with Dr. Nidhi Hernandez, hospitalist. Will admit for possible sepsis. Advised caregiver no bed at [facility] at this time and 9 patients holding in ED for admission. They request admission to [address] [place] [address].    11:25 AM  Discussed with Dr. Karlos Tabares, ED physician at [facility]. Accepts pt for admission. Will transfer to ED per their protocol. Updated Dr. Nidhi Hernandez on plan for transfer to [address] [place] [address]. Caregiver state pt had blood work done by PCP earlier this year and she thinks it was all normal.    BP low and lactic acid elevated. Will give more IVF. Abx ordered and given. BP responded to fluids.     Total critical care time spend exclusive of procedures:  35 min

## 2017-06-17 NOTE — ED TRIAGE NOTES
Pt presents to ED with c/o falling out of bed at group home. Pt with skin tear/ laceration to right knee. Pt with developmental delay. There are no other injuries.  Pt fell about 0630 this am.

## 2017-06-17 NOTE — ED NOTES
Timeout:EMTALA completed. Report to Abigail Arce, P-EMT of [facility]. Pt Is stable for transport to [facility] ED.

## 2017-06-23 LAB
BACTERIA SPEC CULT: NORMAL
SERVICE CMNT-IMP: NORMAL

## 2017-06-24 ENCOUNTER — APPOINTMENT (OUTPATIENT)
Dept: CT IMAGING | Age: 57
End: 2017-06-24
Attending: EMERGENCY MEDICINE
Payer: MEDICAID

## 2017-06-24 ENCOUNTER — HOSPITAL ENCOUNTER (EMERGENCY)
Age: 57
Discharge: HOME | End: 2017-06-24
Attending: EMERGENCY MEDICINE
Payer: MEDICAID

## 2017-06-24 ENCOUNTER — APPOINTMENT (OUTPATIENT)
Dept: GENERAL RADIOLOGY | Age: 57
End: 2017-06-24
Attending: EMERGENCY MEDICINE
Payer: MEDICAID

## 2017-06-24 VITALS
SYSTOLIC BLOOD PRESSURE: 117 MMHG | OXYGEN SATURATION: 96 % | TEMPERATURE: 97 F | HEART RATE: 72 BPM | DIASTOLIC BLOOD PRESSURE: 64 MMHG | BODY MASS INDEX: 26.68 KG/M2 | WEIGHT: 170 LBS | HEIGHT: 67 IN | RESPIRATION RATE: 16 BRPM
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
## 2017-06-24 PROCEDURE — 70450 CT HEAD/BRAIN W/O DYE: CPT

## 2017-06-24 PROCEDURE — 72125 CT NECK SPINE W/O DYE: CPT

## 2017-06-24 PROCEDURE — 99283 EMERGENCY DEPT VISIT LOW MDM: CPT

## 2017-06-24 PROCEDURE — 73562 X-RAY EXAM OF KNEE 3: CPT

## 2017-06-24 PROCEDURE — 74011000250 HC RX REV CODE- 250

## 2017-06-24 RX ORDER — BACITRACIN ZINC 500 UNIT/G
OINTMENT (GRAM) TOPICAL
Status: DISCONTINUED | OUTPATIENT
Start: 2017-06-24 | End: 2017-06-24 | Stop reason: HOSPADM

## 2017-06-24 RX ORDER — BACITRACIN 500 UNIT/G
PACKET (EA) TOPICAL
Status: COMPLETED
Start: 2017-06-24 | End: 2017-06-24

## 2017-06-24 RX ADMIN — BACITRACIN 1 PACKET: 500 OINTMENT TOPICAL at 10:05

## 2017-06-24 NOTE — ED TRIAGE NOTES
Pt returns to ED per RS with c/o falling from bed about 0700 this am. Pt with abrasion to right forehead above right eye. Pt w/o LOC. Pt is non-verbal from a group home. Pt w/o other injuries. Pt was seen here one week ago after fall from bed. Pt's suture line on right knee is intact.

## 2017-06-24 NOTE — DISCHARGE INSTRUCTIONS
Preventing Falls: Care Instructions  Your Care Instructions  Getting around your home safely can be a challenge if you have injuries or health problems that make it easy for you to fall. Loose rugs and furniture in walkways are among the dangers for many older people who have problems walking or who have poor eyesight. People who have conditions such as arthritis, osteoporosis, or dementia also have to be careful not to fall. You can make your home safer with a few simple measures. Follow-up care is a key part of your treatment and safety. Be sure to make and go to all appointments, and call your doctor if you are having problems. It's also a good idea to know your test results and keep a list of the medicines you take. How can you care for yourself at home? Taking care of yourself  · You may get dizzy if you do not drink enough water. To prevent dehydration, drink plenty of fluids, enough so that your urine is light yellow or clear like water. Choose water and other caffeine-free clear liquids. If you have kidney, heart, or liver disease and have to limit fluids, talk with your doctor before you increase the amount of fluids you drink. · Exercise regularly to improve your strength, muscle tone, and balance. Walk if you can. Swimming may be a good choice if you cannot walk easily. · Have your vision and hearing checked each year or any time you notice a change. If you have trouble seeing and hearing, you might not be able to avoid objects and could lose your balance. · Know the side effects of the medicines you take. Ask your doctor or pharmacist whether the medicines you take can affect your balance. Sleeping pills or sedatives can affect your balance. · Limit the amount of alcohol you drink. Alcohol can impair your balance and other senses. · Ask your doctor whether calluses or corns on your feet need to be removed.  If you wear loose-fitting shoes because of calluses or corns, you can lose your balance and fall. · Talk to your doctor if you have numbness in your feet. Preventing falls at home  · Remove raised doorway thresholds, throw rugs, and clutter. Repair loose carpet or raised areas in the floor. · Move furniture and electrical cords to keep them out of walking paths. · Use nonskid floor wax, and wipe up spills right away, especially on ceramic tile floors. · If you use a walker or cane, put rubber tips on it. If you use crutches, clean the bottoms of them regularly with an abrasive pad, such as steel wool. · Keep your house well lit, especially [place], and outside walkways. Use night-lights in areas such as hallways and bathrooms. Add extra light switches or use remote switches (such as switches that go on or off when you clap your hands) to make it easier to turn lights on if you have to get up during the night. · Install sturdy handrails on stairways. · Move items in your cabinets so that the things you use a lot are on the lower shelves (about waist level). · Keep a cordless phone and a flashlight with new batteries by your bed. If possible, put a phone in each of the main rooms of your house, or carry a cell phone in case you fall and cannot reach a phone. Or, you can wear a device around your neck or wrist. You push a button that sends a signal for help. · Wear low-heeled shoes that fit well and give your feet good support. Use footwear with nonskid soles. Check the heels and soles of your shoes for wear. Repair or replace worn heels or soles. · Do not wear socks without shoes on wood floors. · Walk on the grass when the sidewalks are slippery. If you live in an area that gets snow and ice in the winter, sprinkle salt on slippery steps and sidewalks. Preventing falls in the bath  · Install grab bars and nonskid mats inside and outside your shower or tub and near the toilet and sinks. · Use shower chairs and bath benches.   · Use a hand-held shower head that will allow you to sit while showering. · Get into a tub or shower by putting the weaker leg in first. Get out of a tub or shower with your strong side first.  · Repair loose toilet seats and consider installing a raised toilet seat to make getting on and off the toilet easier. · Keep your bathroom door unlocked while you are in the shower. Where can you learn more? Go to http://negrita-edie.info/. Enter [phone] [id number] [phone] in the search box to learn more about \"Preventing Falls: Care Instructions. \"  Current as of: August 4, 2016  Content Version: 11.3  © 8749-9422 [employer]. Care instructions adapted under license by [employer] (which disclaims liability or warranty for this information). If you have questions about a medical condition or this instruction, always ask your healthcare professional. [place] [address] any warranty or liability for your use of this information. We hope that we have addressed all of your medical concerns. The examination and treatment you received in the Emergency Department were for an emergent problem and were not intended as complete care. It is important that you follow up with your healthcare provider(s) for ongoing care. If your symptoms worsen or do not improve as expected, and you are unable to reach your usual health care provider(s), you should return to the Emergency Department. Today's healthcare is undergoing tremendous change, and patient satisfaction surveys are one of the many tools to assess the quality of medical care. You may receive a survey from the [employer] organization regarding your experience in the Emergency Department. I hope that your experience has been completely positive, particularly the medical care that I provided. As such, please participate in the survey; anything less than excellent does not meet my expectations or intentions.         [facility] and [employer] participate in nationally recognized quality of care measures. If your blood pressure is greater than 120/80, as reported below, we urge that you seek medical care to address the potential of high blood pressure, commonly known as hypertension. Hypertension can be hereditary or can be caused by certain medical conditions, pain, stress, or \"white coat syndrome. \"       Please make an appointment with your health care provider(s) for follow up of your Emergency Department visit. VITALS:   Patient Vitals for the past 8 hrs:   Temp Pulse Resp BP SpO2   06/24/17 0824 97 °F (36.1 °C) 72 16 117/64 96 %          Thank you for allowing us to provide you with medical care today. We realize that you have many choices for your emergency care needs. Please choose us in the future for any continued health care needs. Trina Pickett, [address] [facility] [place]: [phone]            No results found for this or any previous visit (from the past 24 hour(s)). Ct Head Wo Cont    Result Date: 6/24/2017  EXAM:  CT HEAD WO CONT INDICATION:   Head trauma, closed, mild, abn neuro exam and/or risk factors COMPARISON: June 17, 2017. TECHNIQUE: Unenhanced CT of the head was performed using 5 mm images. Brain and bone windows were generated. CT dose reduction was achieved through use of a standardized protocol tailored for this examination and automatic exposure control for dose modulation. Adaptive statistical iterative reconstruction (ASIR) was utilized. FINDINGS: The ventricles and sulci are stable in size, shape and configuration and midline. Areas of encephalomalacia throughout the left cerebral hemisphere and in the right occipital lobe are unchanged. There is no new intracranial hemorrhage, extra-axial collection, mass, mass effect or midline shift. The basilar cisterns are open. No acute infarct is identified.  The bone windows demonstrate there is an old right sided craniotomy defect. . The visualized portions of the paranasal sinuses and mastoid air cells are clear. IMPRESSION: No acute intracranial process. 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 Ct Spine Cerv Wo Cont    Result Date: 6/24/2017  EXAM:  CT CERVICAL SPINE WITHOUT CONTRAST INDICATION:   Neck pain following trauma. COMPARISON: None. TECHNIQUE: Multislice helical CT of the cervical spine was performed without intravenous contrast administration. Sagittal and coronal reconstructions were generated. CT dose reduction was achieved through use of a standardized protocol tailored for this examination and automatic exposure control for dose modulation. Adaptive statistical iterative reconstruction (ASIR) was utilized. CONTRAST: None. FINDINGS: The alignment is within normal limits. There is no fracture or subluxation. The odontoid process is intact. The craniocervical junction is within normal limits. There is mild multilevel spondylosis. No significant spinal stenosis or osseous neural foraminal narrowing is evident at any level. The prevertebral soft tissues are within normal limits. IMPRESSION: No acute fracture or subluxation. Xr Knee Rt 3 V    Result Date: 6/24/2017  EXAM:  XR KNEE RT 3 V INDICATION: Right knee pain after fall. COMPARISON: 6/17/2017. FINDINGS: Three views of the right knee demonstrate no fracture, effusion or other acute osseous or articular abnormality. The soft tissues are within normal limits. IMPRESSION: Normal right knee.

## 2017-06-24 NOTE — ED PROVIDER NOTES
HPI Comments: 24-year-old male with a history of MR who resides in a group home presents after a fall. He reportedly fell out of bed. Staff left him alone briefly and he fell out of bed striking his head. They heard him fall and came immediately and stated he did not lose consciousness. Staff member accompanies him today and states that he is acting normally. He was recently admitted to [facility] for a urinary tract infection. He reportedly completed his course of antibiotics. Staff states that he is walking normally and acting normally. He does have an abrasion to the right forehead. He also had stitches a week ago at to his right knee. Patient is nonverbal and unable to provide any history. There are no other medical concerns at this time. Patient is a 60 y.o. male presenting with fall. The history is provided by the EMS personnel and a caregiver. Fall          Past Medical History:   Diagnosis Date    Developmental delay     Endocrine disease     Neurological disorder     CP    Psychiatric disorder     Seizures ([facility] [address]        History reviewed. No pertinent surgical history. Family History:   Problem Relation Age of Onset    Family history unknown: Yes       Social History     Social History    Marital status: SINGLE     Spouse name: N/A    Number of children: N/A    Years of education: N/A     Occupational History    Not on file. Social History Main Topics    Smoking status: Never Smoker    Smokeless tobacco: Never Used    Alcohol use No    Drug use: Yes     Special: Prescription    Sexual activity: Not on file     Other Topics Concern    Not on file     Social History Narrative         ALLERGIES: Review of patient's allergies indicates no known allergies.     Review of Systems   Reason unable to perform ROS: nonverbal.       Vitals:    06/24/17 0824   BP: 117/64   Pulse: 72   Resp: 16   Temp: 97 °F (36.1 °C)   SpO2: 96%   Weight: 77.1 kg (170 lb)   Height: 5' 7\" (1.702 m) Physical Exam   Constitutional: No distress. Mentally retarded male in no acute distress   HENT:   Head: Normocephalic. Abrasion to right forehead   Eyes: Pupils are equal, round, and reactive to light. No scleral icterus. Neck: Normal range of motion. Neck supple. No tracheal deviation present. Cardiovascular: Normal rate, regular rhythm, normal heart sounds and intact distal pulses. Exam reveals no gallop and no friction rub. No murmur heard. Pulmonary/Chest: Effort normal and breath sounds normal. No respiratory distress. He has no wheezes. He has no rales. Abdominal: Soft. He exhibits no distension. There is no tenderness. There is no rebound and no guarding. Musculoskeletal: He exhibits no edema. Neurological: He is alert. Ambulates at baseline. Non-verbal.    Skin: Skin is warm and dry. Sutures in place right knee   Psychiatric:   Pleasant, smiles   Nursing note and vitals reviewed. MDM  Number of Diagnoses or Management Options  Fall, initial encounter:   Diagnosis management comments: Diff dx: ICH, cervical fracture, knee fracture, abrasion, contusion    ED Course       Procedures    9:54 AM  The patient has been reevaluated. The patient is ready for discharge. The patient's signs, symptoms, diagnosis, and discharge instructions have been discussed and the patient/ family has conveyed their understanding. The patient is to follow up as recommended or return to the ED should their symptoms worsen. Plan has been discussed and the patient is in agreement. LABORATORY TESTS:  No results found for this or any previous visit (from the past 12 hour(s)). IMAGING RESULTS:  XR KNEE RT 3 V   Final Result      CT SPINE CERV WO CONT   Final Result      CT HEAD WO CONT   Final Result        Ct Head Wo Cont    Result Date: 6/24/2017  EXAM:  CT HEAD WO CONT INDICATION:   Head trauma, closed, mild, abn neuro exam and/or risk factors COMPARISON: June 17, 2017.  TECHNIQUE: Unenhanced CT of the head was performed using 5 mm images. Brain and bone windows were generated. CT dose reduction was achieved through use of a standardized protocol tailored for this examination and automatic exposure control for dose modulation. Adaptive statistical iterative reconstruction (ASIR) was utilized. FINDINGS: The ventricles and sulci are stable in size, shape and configuration and midline. Areas of encephalomalacia throughout the left cerebral hemisphere and in the right occipital lobe are unchanged. There is no new intracranial hemorrhage, extra-axial collection, mass, mass effect or midline shift. The basilar cisterns are open. No acute infarct is identified. The bone windows demonstrate there is an old right sided craniotomy defect. . The visualized portions of the paranasal sinuses and mastoid air cells are clear. IMPRESSION: No acute intracranial process. 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 Ct Spine Cerv Wo Cont    Result Date: 6/24/2017  EXAM:  CT CERVICAL SPINE WITHOUT CONTRAST INDICATION:   Neck pain following trauma. COMPARISON: None. TECHNIQUE: Multislice helical CT of the cervical spine was performed without intravenous contrast administration. Sagittal and coronal reconstructions were generated. CT dose reduction was achieved through use of a standardized protocol tailored for this examination and automatic exposure control for dose modulation. Adaptive statistical iterative reconstruction (ASIR) was utilized. CONTRAST: None. FINDINGS: The alignment is within normal limits. There is no fracture or subluxation. The odontoid process is intact. The craniocervical junction is within normal limits. There is mild multilevel spondylosis. No significant spinal stenosis or osseous neural foraminal narrowing is evident at any level. The prevertebral soft tissues are within normal limits. IMPRESSION: No acute fracture or subluxation.      Xr Knee Rt 3 V    Result Date: 6/24/2017  EXAM:  XR KNEE RT 3 V INDICATION: Right knee pain after fall. COMPARISON: 6/17/2017. FINDINGS: Three views of the right knee demonstrate no fracture, effusion or other acute osseous or articular abnormality. The soft tissues are within normal limits. IMPRESSION: Normal right knee. MEDICATIONS GIVEN:  Medications - No data to display    IMPRESSION:  1. Fall, initial encounter        PLAN:  1. Current Discharge Medication List        2. Follow-up Information     Follow up With Details Comments Contact Info    Susana Paz MD Call in 2 days  [address] [phone]            Return to ED for new or worsening symptoms       Jose Gonzalez.  Shadi Arellano MD

## 2017-07-26 ENCOUNTER — APPOINTMENT (OUTPATIENT)
Dept: ULTRASOUND IMAGING | Age: 57
End: 2017-07-26
Attending: EMERGENCY MEDICINE
Payer: MEDICAID

## 2017-07-26 ENCOUNTER — HOSPITAL ENCOUNTER (EMERGENCY)
Age: 57
Discharge: HOME | End: 2017-07-26
Attending: EMERGENCY MEDICINE
Payer: MEDICAID

## 2017-07-26 VITALS
DIASTOLIC BLOOD PRESSURE: 69 MMHG | HEART RATE: 64 BPM | RESPIRATION RATE: 16 BRPM | SYSTOLIC BLOOD PRESSURE: 113 MMHG | OXYGEN SATURATION: 98 % | TEMPERATURE: 96.7 F

## 2017-07-26 DIAGNOSIS — M79.89 RIGHT LEG SWELLING: Primary | ICD-10-CM

## 2017-07-26 PROCEDURE — 93971 EXTREMITY STUDY: CPT

## 2017-07-26 PROCEDURE — 99282 EMERGENCY DEPT VISIT SF MDM: CPT

## 2017-07-26 NOTE — ED NOTES
The patient was discharged home by Dr. Luz Elena Cantu  in stable condition. The patient is alert and oriented, in no respiratory distress and discharge vital signs obtained. The patient's diagnosis, condition and treatment were explained. The patient/caregiver expressed understanding. No prescriptions given. A work/school note given. A discharge plan has been developed. A  was not involved in the process. Aftercare instructions were given. Pt discharged from the ED via w/c with caregiver.

## 2017-07-26 NOTE — ED TRIAGE NOTES
Pt arrives from group home, caregiver states that staff noticed that pt's right leg was more swollen and had an indention this morning. Pt has a new leg brace that they think may be contributing to swelling. On arrival, no indention is noted, but pt does have 2+ pitting edema to right leg.

## 2017-07-26 NOTE — DISCHARGE INSTRUCTIONS
We hope that we have addressed all of your medical concerns. The examination and treatment you received in the Emergency Department were for an emergent problem and were not intended as complete care. It is important that you follow up with your healthcare provider(s) for ongoing care. If your symptoms worsen or do not improve as expected, and you are unable to reach your usual health care provider(s), you should return to the Emergency Department. Today's healthcare is undergoing tremendous change, and patient satisfaction surveys are one of the many tools to assess the quality of medical care. You may receive a survey from the [employer] regarding your experience in the Emergency Department. I hope that your experience has been completely positive, particularly the medical care that I provided. As such, please participate in the survey; anything less than excellent does not meet my expectations or intentions. [facility] and [address] [place] [address] participate in nationally recognized quality of care measures. If your blood pressure is greater than 120/80, as reported below, we urge that you seek medical care to address the potential of high blood pressure, commonly known as hypertension. Hypertension can be hereditary or can be caused by certain medical conditions, pain, stress, or \"white coat syndrome. \"       Please make an appointment with your health care provider(s) for follow up of your Emergency Department visit. VITALS:   Patient Vitals for the past 8 hrs:   Temp Pulse Resp BP SpO2   07/26/17 0704 96.3 °F (35.7 °C) 81 16 118/87 98 %          Thank you for allowing us to provide you with medical care today. We realize that you have many choices for your emergency care needs. Please choose us in the future for any continued health care needs. Marveen Bode Fredrick Hamman, [address] [place] [address] [address] Office: [phone]            No results found for this or any previous visit (from the past 24 hour(s)). Duplex Lower Ext Venous Right    Result Date: 7/26/2017  INDICATION:  right leg swelling COMPARISON: None. FINDINGS: Duplex Doppler sonography of the right lower extremity was performed from the groin to the calf. The right common femoral, femoral and popliteal veins are compressible with normal color-flow and wave forms and response to physiologic maneuvers including Valsalva and augmentation. IMPRESSION:  No deep venous thrombosis identified. Leg and Ankle Edema: Care Instructions  Your Care Instructions  Swelling in the legs, ankles, and feet is called edema. It is common after you sit or stand for a while. Long plane flights or car rides often cause swelling in the legs and feet. You may also have swelling if you have to stand for long periods of time at your job. Problems with the veins in the legs (varicose veins) and changes in hormones can also cause swelling. Sometimes the swelling in the ankles and feet is caused by a more serious problem, such as heart failure, infection, blood clots, or liver or kidney disease. Follow-up care is a key part of your treatment and safety. Be sure to make and go to all appointments, and call your doctor if you are having problems. Its also a good idea to know your test results and keep a list of the medicines you take. How can you care for yourself at home? · If your doctor gave you medicine, take it as prescribed. Call your doctor if you think you are having a problem with your medicine. · Whenever you are resting, raise your legs up. Try to keep the swollen area higher than the level of your heart. · Take breaks from standing or sitting in one position. ¨ Walk around to increase the blood flow in your lower legs. ¨ Move your feet and ankles often while you stand, or tighten and relax your leg muscles. · Wear support stockings.  Put them on in the morning, before swelling gets worse. · Eat a balanced diet. Lose weight if you need to. · Limit the amount of salt (sodium) in your diet. Salt holds fluid in the body and may increase swelling. When should you call for help? Call 911 anytime you think you may need emergency care. For example, call if:  · You have symptoms of a blood clot in your lung (called a pulmonary embolism). These may include:  ¨ Sudden chest pain. ¨ Trouble breathing. ¨ Coughing up blood. Call your doctor now or seek immediate medical care if:  · You have signs of a blood clot, such as:  ¨ Pain in your calf, back of the knee, thigh, or groin. ¨ Redness and swelling in your leg or groin. · You have symptoms of infection, such as:  ¨ Increased pain, swelling, warmth, or redness. ¨ Red streaks or pus. ¨ A fever. Watch closely for changes in your health, and be sure to contact your doctor if:  · Your swelling is getting worse. · You have new or worsening pain in your legs. · You do not get better as expected. Where can you learn more? Go to http://negrita-edie.info/. Enter H086 in the search box to learn more about \"Leg and Ankle Edema: Care Instructions. \"  Current as of: March 20, 2017  Content Version: 11.3  © 6188-7524 [employer]. Care instructions adapted under license by [employer] (which disclaims liability or warranty for this information). If you have questions about a medical condition or this instruction, always ask your healthcare professional. [place] [address] any warranty or liability for your use of this information.

## 2017-07-26 NOTE — LETTER
NOTIFICATION RETURN TO WORK / SCHOOL 
 
7/26/2017 8:59 AM 
 
Mr. Rob Macias [address] [place] [id number] To Whom It May Concern: 
 
Rob Macias is currently under the care of [facility] EMERGENCY DEPT. He may resume his usual activities. Keep the right leg elevated when possible. The right leg brace needs to be checked by the . If there are questions or concerns please have the patient contact our office. Sincerely, Nahum Low MD

## 2017-07-26 NOTE — ED PROVIDER NOTES
HPI Comments: 49-year-old male with a history of diabetes, hypothyroidism, psoriasis, cerebral palsy, mild mental retardation, hyperlipidemia, seizures here with right leg swelling noted this morning. Patient had a knee brace applied on July 21 to the right leg. Patient resides in a group home and the staff noted that his right leg was swollen this morning. They noticed an indentation around his ankle. He previously had stitches below his right knee which have been removed. The stitches were related to a fall. The staff member with him denies the patient complaining of chest pain, shortness of breath or fevers. History limited by the patient's inability to provide history. STAFF member does not recall  and records do not show prior blood clots. Social history: Immunizations up to date. Resides in group home. The history is provided by the patient and a caregiver (staff member from group home). Past Medical History:   Diagnosis Date    Cerebral palsy ([facility]     CP    Developmental delay     Diabetes ([facility]     Hypothyroidism     Mental retardation     Mild    Psoriasis     Psychiatric disorder     Seizures ([facility]        History reviewed. No pertinent surgical history. Family History:   Problem Relation Age of Onset    Family history unknown: Yes       Social History     Social History    Marital status: SINGLE     Spouse name: N/A    Number of children: N/A    Years of education: N/A     Occupational History    Not on file. Social History Main Topics    Smoking status: Never Smoker    Smokeless tobacco: Never Used    Alcohol use No    Drug use: Yes     Special: Prescription    Sexual activity: Not on file     Other Topics Concern    Not on file     Social History Narrative         ALLERGIES: Review of patient's allergies indicates no known allergies.     Review of Systems   Unable to perform ROS: Psychiatric disorder (mental retardation)       Vitals:    07/26/17 0704   BP: 118/87   Pulse: 81   Resp: 16   Temp: 96.3 °F (35.7 °C)   SpO2: 98%            Physical Exam   Nursing note and vitals reviewed. Constitutional: IN WHEELCHAIR. No distress. HENT:   Head: Normocephalic and atraumatic. Sclera anicteric  Nose: No rhinorrhea  Mouth/Throat: Oropharynx is clear and moist. Pharynx normal  Eyes: Conjunctivae are normal. Pupils are equal, round, and reactive to light. Right eye exhibits no discharge. Left eye exhibits no discharge. No scleral icterus. Neck: Painless normal range of motion. Supple  Cardiovascular: Normal rate, regular rhythm, normal heart sounds and intact distal pulses. Exam reveals no gallop and no friction rub. No murmur heard. Pulmonary/Chest: Effort normal and breath sounds normal. No respiratory distress. no wheezes. no rales. Abdominal: Soft. Exhibits no distension and no mass. No tenderness. No guarding. Musculoskeletal: no tenderness. RIGHT LE+ EDEMA WITHOUT ERYTHEMA, WARMTH. ABOUT 1 CM ABRASION TO BELOW RIGHT KNEE.  NO TENDERNESS TO THE RIGHT LEG. Lymphadenopathy:   No cervical adenopathy. Neurological:  Alert. Eyes open. Says some words. Right arm contracture. Skin: Skin is warm and dry. No rash noted. No pallor. [facility]  ED Course   40-year-old male with right leg swelling. History of recent brace which is new to the right leg. No PE symptoms. Not tachycardic, tachypneic or hypoxic. No prior history of blood clots. Temperature initially recorded as low but may be error as the patient moved away. no evidence otherwise of infection. recheck temp. DDX:  related to brace, DVT, dependant fluid and others. check right leg venous duplex. Procedures    8:47 AM  Patient's results have been reviewed with them. RECHECK TEMP IS 96.7 AXILLARY - PT WILL NOT ALLOW ORAL TEMPERATURE AND PULLS AWAY WITH EAR THERMOMETER.   Patient and/or family have verbally conveyed their understanding and agreement of the patient's signs, symptoms, diagnosis, treatment and prognosis and additionally agree to follow up as recommended or return to the Emergency Room should their condition change prior to follow-up. Discharge instructions have also been provided to the patient with some educational information regarding their diagnosis as well a list of reasons why they would want to return to the ER prior to their follow-up appointment should their condition change. No results found for this or any previous visit (from the past 24 hour(s)). Duplex Lower Ext Venous Right    Result Date: 7/26/2017  INDICATION:  right leg swelling COMPARISON: None. FINDINGS: Duplex Doppler sonography of the right lower extremity was performed from the groin to the calf. The right common femoral, femoral and popliteal veins are compressible with normal color-flow and wave forms and response to physiologic maneuvers including Valsalva and augmentation. IMPRESSION:  No deep venous thrombosis identified.

## 2017-08-25 ENCOUNTER — HOSPITAL ENCOUNTER (EMERGENCY)
Age: 57
Discharge: HOME | End: 2017-08-25
Attending: EMERGENCY MEDICINE | Admitting: EMERGENCY MEDICINE
Payer: MEDICAID

## 2017-08-25 VITALS
OXYGEN SATURATION: 94 % | SYSTOLIC BLOOD PRESSURE: 132 MMHG | RESPIRATION RATE: 20 BRPM | HEART RATE: 60 BPM | TEMPERATURE: 97.5 F | WEIGHT: 166.45 LBS | DIASTOLIC BLOOD PRESSURE: 76 MMHG | BODY MASS INDEX: 26.07 KG/M2

## 2017-08-25 DIAGNOSIS — M79.662 PAIN AND SWELLING OF LEFT LOWER LEG: Primary | ICD-10-CM

## 2017-08-25 DIAGNOSIS — M79.89 PAIN AND SWELLING OF LEFT LOWER LEG: Primary | ICD-10-CM

## 2017-08-25 PROCEDURE — 99282 EMERGENCY DEPT VISIT SF MDM: CPT

## 2017-08-25 NOTE — ED TRIAGE NOTES
Pt ambulates to treatment area with caregiver, she states that for the past 4 months pt has been here and to the PCP for this same reason of right leg, ankle and foot swelling due to the improper brace he has been wearing. Pt is to get the correct brace and shoe this coming Wednesday but the group home wants him checked again for the swelling.

## 2017-08-25 NOTE — ED PROVIDER NOTES
HPI Comments: 59 y.o. male with past medical history significant for cerebral palsy, seizure, DM, and psoriasis presents with caregiver from group home due to right leg swelling and redness x 4 months. The care giver explained \"his leg brace is too small and we are meeting with the orthopedic doctor on Wednesday for a new brace but my boss told me to come get him checked out. \"  The caregiver explained that these are not new symptoms. He has been seen in the ED in the past and has had negative DVT workup. There are no other acute medical complaints at this time. PCP: MD Josiah Ellis PA-C    Patient is a 59 y.o. male presenting with leg pain and ankle swelling. Leg Pain    Pertinent negatives include no numbness and no back pain. Ankle swelling    Pertinent negatives include no numbness and no back pain. Past Medical History:   Diagnosis Date    C[facility]bral pals[facility] ([facility]     CP    Developmental delay     Diabetes ([facility]     Hypothyroidism     Mental retardation     Mild    Psoriasis     Psychiatric disorder     Seizures ([facility]        History reviewed. No pertinent surgical history. Family History:   Problem Relation Age of Onset    Family history unknown: Yes       Social History     Social History    Marital status: SINGLE     Spouse name: N/A    Number of children: N/A    Years of education: N/A     Occupational History    Not on file. Social History Main Topics    Smoking status: Never Smoker    Smokeless tobacco: Never Used    Alcohol use No    Drug use: Yes     Special: Prescription    Sexual activity: Not on file     Other Topics Concern    Not on file     Social History Narrative         ALLERGIES: Review of patient's allergies indicates no known allergies. Review of Systems   Constitutional: Negative for activity change, appetite change, diaphoresis and fever.    HENT: Negative for ear discharge, ear pain, facial swelling, rhinorrhea, sore throat, tinnitus, trouble swallowing and voice change. Eyes: Negative for photophobia, pain, discharge, redness and visual disturbance. Respiratory: Negative for cough, chest tightness, shortness of breath, wheezing and stridor. Cardiovascular: Negative for chest pain and palpitations. Gastrointestinal: Negative for abdominal pain, constipation, diarrhea, nausea and vomiting. Endocrine: Negative for polydipsia and polyuria. Genitourinary: Negative for dysuria, flank pain and hematuria. Musculoskeletal: Positive for arthralgias, joint swelling and myalgias. Negative for back pain. Skin: Positive for color change. Negative for rash. Neurological: Negative for dizziness, syncope, speech difficulty, light-headedness and numbness. Psychiatric/Behavioral: Negative for behavioral problems. Vitals:    08/25/17 1237   BP: 132/76   Pulse: 60   Resp: 20   Temp: 97.5 °F (36.4 °C)   SpO2: 94%   Weight: 75.5 kg (166 lb 7.2 oz)            Physical Exam   Constitutional: He is oriented to person, place, and time. He appears well-developed and well-nourished. No distress. HENT:   Head: Normocephalic and atraumatic. Eyes: Conjunctivae are normal. Pupils are equal, round, and reactive to light. Neck: Normal range of motion. Neck supple. Cardiovascular: Normal rate, regular rhythm and normal heart sounds. Pulmonary/Chest: Effort normal and breath sounds normal. No respiratory distress. He has no wheezes. Abdominal: Soft. Bowel sounds are normal. He exhibits no distension. There is no tenderness. Musculoskeletal: Normal range of motion. Please see caption below for description of leg swelling and redness. Pt is NVI. Neurological: He is alert and oriented to person, place, and time. Skin: Skin is warm. He is not diaphoretic.         MDM  Number of Diagnoses or Management Options  Pain and swelling of left lower leg:   Diagnosis management comments: Pt presents to the ED from group home due to RLE swelling and redness. No clinical indication of DVT. Symptoms appear to be from leg brace being to small. Pt has appointment with ortho for leg brace adjustment. Educated caregiver that it may take time for the swelling and redness to go down. Will dc home. Reviewed treatment plan with attending and they agree.   Fabian Harris PA-C    ED Course       Procedures

## 2017-08-25 NOTE — ED NOTES
Pt's caregiver given discharge instructions by Jeraldine Bosworth PA she verbalizes an understanding, pt stable at time of discharge ambulates to lobby with caregiver

## 2017-08-25 NOTE — DISCHARGE INSTRUCTIONS
We hope that we have addressed all of your medical concerns. The examination and treatment you received in the Emergency Department were for an emergent problem and were not intended as complete care. It is important that you follow up with your healthcare provider(s) for ongoing care. If your symptoms worsen or do not improve as expected, and you are unable to reach your usual health care provider(s), you should return to the Emergency Department. Today's healthcare is undergoing tremendous change, and patient satisfaction surveys are one of the many tools to assess the quality of medical care. You may receive a survey from the [employer] regarding your experience in the Emergency Department. I hope that your experience has been completely positive, particularly the medical care that I provided. As such, please participate in the survey; anything less than excellent does not meet my expectations or intentions. [facility] and [address] [place] [address] participate in nationally recognized quality of care measures. If your blood pressure is greater than 120/80, as reported below, we urge that you seek medical care to address the potential of high blood pressure, commonly known as hypertension. Hypertension can be hereditary or can be caused by certain medical conditions, pain, stress, or \"white coat syndrome. \"       Please make an appointment with your health care provider(s) for follow up of your Emergency Department visit. VITALS:   Patient Vitals for the past 8 hrs:   Temp Pulse Resp BP SpO2   08/25/17 1237 97.5 °F (36.4 °C) 60 20 132/76 94 %          Thank you for allowing us to provide you with medical care today. We realize that you have many choices for your emergency care needs. Please choose us in the future for any continued health care needs. Coleen Douglass, [address] [place] [address] [address]   Office: [phone]            No results found for this or any previous visit (from the past 24 hour(s)). No results found.

## 2017-08-28 ENCOUNTER — HOSPITAL ENCOUNTER (EMERGENCY)
Age: 57
Discharge: HOME | End: 2017-08-28
Attending: EMERGENCY MEDICINE
Payer: MEDICAID

## 2017-08-28 ENCOUNTER — APPOINTMENT (OUTPATIENT)
Dept: ULTRASOUND IMAGING | Age: 57
End: 2017-08-28
Attending: NURSE PRACTITIONER
Payer: MEDICAID

## 2017-08-28 VITALS
TEMPERATURE: 97.5 F | RESPIRATION RATE: 16 BRPM | HEART RATE: 67 BPM | BODY MASS INDEX: 25.55 KG/M2 | SYSTOLIC BLOOD PRESSURE: 116 MMHG | DIASTOLIC BLOOD PRESSURE: 75 MMHG | WEIGHT: 163.14 LBS | OXYGEN SATURATION: 97 %

## 2017-08-28 DIAGNOSIS — L03.115 CELLULITIS OF RIGHT LOWER LEG: Primary | ICD-10-CM

## 2017-08-28 PROCEDURE — 93971 EXTREMITY STUDY: CPT

## 2017-08-28 PROCEDURE — 99282 EMERGENCY DEPT VISIT SF MDM: CPT

## 2017-08-28 RX ORDER — CEPHALEXIN 500 MG/1
500 CAPSULE ORAL 4 TIMES DAILY
Qty: 28 CAP | Refills: 0 | Status: SHIPPED | OUTPATIENT
Start: 2017-08-28 | End: 2017-09-04

## 2017-08-28 NOTE — DISCHARGE INSTRUCTIONS
We hope that we have addressed all of your medical concerns. The examination and treatment you received in the Emergency Department were for an emergent problem and were not intended as complete care. It is important that you follow up with your healthcare provider(s) for ongoing care. If your symptoms worsen or do not improve as expected, and you are unable to reach your usual health care provider(s), you should return to the Emergency Department. Today's healthcare is undergoing tremendous change, and patient satisfaction surveys are one of the many tools to assess the quality of medical care. You may receive a survey from the [employer] regarding your experience in the Emergency Department. I hope that your experience has been completely positive, particularly the medical care that I provided. As such, please participate in the survey; anything less than excellent does not meet my expectations or intentions. [facility] and [address] [place] [address] participate in nationally recognized quality of care measures. If your blood pressure is greater than 120/80, as reported below, we urge that you seek medical care to address the potential of high blood pressure, commonly known as hypertension. Hypertension can be hereditary or can be caused by certain medical conditions, pain, stress, or \"white coat syndrome. \"       Please make an appointment with your health care provider(s) for follow up of your Emergency Department visit. VITALS:   Patient Vitals for the past 8 hrs:   Temp Pulse Resp BP SpO2   08/28/17 1219 97.5 °F (36.4 °C) 67 16 116/75 97 %          Thank you for allowing us to provide you with medical care today. We realize that you have many choices for your emergency care needs. Please choose us in the future for any continued health care needs. Regards,           Mirian León, [facility].   Office: [phone]            No results found for this or any previous visit (from the past 24 hour(s)). Duplex Lower Ext Venous Right    Result Date: 8/28/2017  Indication: Right Leg swelling, pain and redness for one month, DVT suspected Duplex Doppler sonography of the right lower extremity was performed from the groin to the calf. The  femoral, femoral and popliteal veins are compressible with normal color-flow and wave forms and response to physiologic maneuvers including Valsalva and augmentation. There is suboptimal visualization of the deep veins of the right calf. IMPRESSION:  No evidence of DVT. Cellulitis: Care Instructions  Your Care Instructions    Cellulitis is a skin infection. It often occurs after a break in the skin from a scrape, cut, bite, or puncture, or after a rash. The doctor has checked you carefully, but problems can develop later. If you notice any problems or new symptoms, get medical treatment right away. Follow-up care is a key part of your treatment and safety. Be sure to make and go to all appointments, and call your doctor if you are having problems. It's also a good idea to know your test results and keep a list of the medicines you take. How can you care for yourself at home? · Take your antibiotics as directed. Do not stop taking them just because you feel better. You need to take the full course of antibiotics. · Prop up the infected area on pillows to reduce pain and swelling. Try to keep the area above the level of your heart as often as you can. · If your doctor told you how to care for your wound, follow your doctor's instructions. If you did not get instructions, follow this general advice:  ¨ Wash the wound with clean water 2 times a day. Don't use hydrogen peroxide or alcohol, which can slow healing. ¨ You may cover the wound with a thin layer of petroleum jelly, such as Vaseline, and a nonstick bandage.   ¨ Apply more petroleum jelly and replace the bandage as needed. · Be safe with medicines. Take pain medicines exactly as directed. ¨ If the doctor gave you a prescription medicine for pain, take it as prescribed. ¨ If you are not taking a prescription pain medicine, ask your doctor if you can take an over-the-counter medicine. To prevent cellulitis in the future  · Try to prevent cuts, scrapes, or other injuries to your skin. Cellulitis most often occurs where there is a break in the skin. · If you get a scrape, cut, mild burn, or bite, wash the wound with clean water as soon as you can to help avoid infection. Don't use hydrogen peroxide or alcohol, which can slow healing. · If you have swelling in your legs (edema), support stockings and good skin care may help prevent leg sores and cellulitis. · Take care of your feet, especially if you have diabetes or other conditions that increase the risk of infection. Wear shoes and socks. Do not go barefoot. If you have athlete's foot or other skin problems on your feet, talk to your doctor about how to treat them. When should you call for help? Call your doctor now or seek immediate medical care if:  · You have signs that your infection is getting worse, such as:  ¨ Increased pain, swelling, warmth, or redness. ¨ Red streaks leading from the area. ¨ Pus draining from the area. ¨ A fever. · You get a rash. Watch closely for changes in your health, and be sure to contact your doctor if:  · You are not getting better after 1 day (24 hours). · You do not get better as expected. Where can you learn more? Go to http://negrita-edie.info/. Richard Winn in the search box to learn more about \"Cellulitis: Care Instructions. \"  Current as of: October 13, 2016  Content Version: 11.3  © 4454-9676 [employer]. Care instructions adapted under license by [employer] (which disclaims liability or warranty for this information).  If you have questions about a medical condition or this instruction, always ask your healthcare professional. [place] [address] any warranty or liability for your use of this information.

## 2017-08-28 NOTE — ED PROVIDER NOTES
HPI Comments: Patient is a 27-year-old male with a past medical history significant for cerebral palsy, seizure, diabetes, psoriasis, mental retardation who is brought to the ED via private vehicle with a caregiver from the group home with right leg swelling and redness for the last 4 months. The caregiver explained that they were here a few days ago. They believe the leg brace that he has been wearing is too small and he has an appointment in 2 days to have the orthodist adjust the brace. He also has an appointment tomorrow with his primary caregiver. He's had several workups over the last 2 months for the same diagnosis. He had a workup approximately one month ago that was negative for DVT. However in light of no change in the right leg pain, swelling and redness they would like an additional workup. The caregiver denies any fevers, chills or increased pain over the last 3 days. The  sent them in for a re-evaluation. No further complaints at this time. No current facility-administered medications on file prior to encounter. Current Outpatient Prescriptions on File Prior to Encounter:  carBAMazepine XR (TEGRETOL XR) 200 mg SR tablet, TAKE ONE TABLET TWICE A DAY *BRAND PREF*, Disp: 60 Tab, Rfl: 5  DEPAKOTE  mg ER tablet, TAKE THREE (3) TABLETS DAILY *BRAND MEDICALLY NECESSARY*, Disp: 90 Tab, Rfl: 5  cetaphil (CETAPHIL) topical cream, Apply  to affected area as needed for Dry Skin., Disp: , Rfl:   hydrocortisone valerate (WESTCORT) 0.2 % topical cream, Apply  to affected area two (2) times a day. use thin layer, Disp: , Rfl:   clobetasol (CLOBEX) 0.05 % lotion, Apply  to affected area two (2) times a day., Disp: , Rfl:   sennosides (SENNA) 8.6 mg cap, Take  by mouth., Disp: , Rfl:   cholecalciferol (VITAMIN D3) 400 unit tab tablet, Take  by mouth daily. , Disp: , Rfl:   loratadine (CLARITIN) 10 mg tablet, Take 10 mg by mouth daily. , Disp: , Rfl:   Fesoterodine (TOVIAZ) 4 mg SR tablet, Take 8 mg by mouth daily. , Disp: , Rfl:   polyethylene glycol (MIRALAX) 17 gram packet, Take 17 g by mouth daily. , Disp: , Rfl:   ferrous sulfate (IRON) 325 mg (65 mg iron) tablet, Take  by mouth Daily (before breakfast). , Disp: , Rfl:   risperiDONE (RISPERDAL) 2 mg tablet, Take 2 mg by mouth daily. Take 1 tab in the am and 2 tabs pm, Disp: , Rfl:   simvastatin (ZOCOR) 40 mg tablet, Take  by mouth nightly.  , Disp: , Rfl:   levothyroxine (SYNTHROID) 75 mcg tablet, Take  by mouth Daily (before breakfast). , Disp: , Rfl:   lisinopril (PRINIVIL, ZESTRIL) 5 mg tablet, Take  by mouth daily. , Disp: , Rfl:   therapeutic multivitamin (THERAGRAN) tablet, Take 1 Tab by mouth daily. , Disp: , Rfl:   tiZANidine (ZANAFLEX) 2 mg capsule, Take 2 mg by mouth three (3) times daily. , Disp: , Rfl:   fluvoxaMINE (LUVOX) 50 mg tablet, Take  by mouth two (2) times a day.  , Disp: , Rfl:   risperiDONE (RISPERDAL) 4 mg tablet, Take  by mouth.  , Disp: , Rfl:   etanercept (ENBREL) 50 mg/mL (0.98 mL) injection, by SubCUTAneous route.  , Disp: , Rfl:       No Known Allergies      Patient is a 59 y.o. male presenting with skin problem. The history is provided by a caregiver. Skin Problem    This is a recurrent problem. The problem has not changed since onset. There has been no fever. The rash is present on the right lower leg. Past Medical History:   Diagnosis Date    Cerebral palsy ([facility] [address] [facility]     CP    Developmental delay     Diabetes ([facility] [facility]     Hypothyroidism     Mental retardation     Mild    Psoriasis     Psychiatric disorder     Seizures ([facility] [address]        History reviewed. No pertinent surgical history. Family History:   Problem Relation Age of Onset    Family history unknown: Yes       Social History     Social History    Marital status: SINGLE     Spouse name: N/A    Number of children: N/A    Years of education: N/A     Occupational History    Not on file.      Social History Main Topics    Smoking status: Never Smoker    Smokeless tobacco: Never Used    Alcohol use No    Drug use: Yes     Special: Prescription    Sexual activity: Not on file     Other Topics Concern    Not on file     Social History Narrative         ALLERGIES: Review of patient's allergies indicates no known allergies. Review of Systems   Constitutional: Positive for activity change. Negative for chills, fatigue and fever. HENT: Negative. Eyes: Negative. Respiratory: Negative. Cardiovascular: Negative. Gastrointestinal: Negative. Endocrine: Negative. Genitourinary: Negative. Musculoskeletal: Positive for arthralgias, gait problem and myalgias. Skin: Positive for color change. Allergic/Immunologic: Negative. Hematological: Negative. Psychiatric/Behavioral: Negative. Vitals:    08/28/17 1219   BP: 116/75   Pulse: 67   Resp: 16   Temp: 97.5 °F (36.4 °C)   SpO2: 97%   Weight: 74 kg (163 lb 2.3 oz)            Physical Exam   Constitutional: He is oriented to person, place, and time. Vital signs are normal. He appears well-developed and well-nourished. He is active. HENT:   Head: Normocephalic and atraumatic. Cardiovascular: Normal rate and regular rhythm. Musculoskeletal: He exhibits edema and tenderness. He exhibits no deformity. Neurological: He is alert and oriented to person, place, and time. Skin: Skin is warm and dry. No abrasion, no bruising, no burn, no ecchymosis, no laceration, no lesion, no petechiae and no rash noted. There is erythema. No pallor. See attached photo   Psychiatric:   Cerebral palsy & MR at baseline. Wheelchair bound. Nursing note and vitals reviewed.        MDM  Number of Diagnoses or Management Options  Cellulitis of right lower leg:   Diagnosis management comments: Assessment & Plan:   Duplex of RLE to re-eval for DVT  Due to chronic redness & edema will treat as a cellulitis on the RLE    Discussed with MD Sugey Rush, NP  08/28/17  12:43 PM    DVT study negative. Will start on Keflex    1:34 PM  The patient has been reevaluated. The patient is ready for discharge. The patient's signs, symptoms, diagnosis, and discharge instructions have been discussed and the patient/ family has conveyed their understanding. The patient is to follow up as recommended or return to the ED should their symptoms worsen. Plan has been discussed and the patient is in agreement. LABORATORY TESTS:  No results found for this or any previous visit (from the past 12 hour(s)). IMAGING RESULTS:  DUPLEX LOWER EXT VENOUS RIGHT   Final Result     Duplex Lower Ext Venous Right    Result Date: 8/28/2017  Indication: Right Leg swelling, pain and redness for one month, DVT suspected Duplex Doppler sonography of the right lower extremity was performed from the groin to the calf. The  femoral, femoral and popliteal veins are compressible with normal color-flow and wave forms and response to physiologic maneuvers including Valsalva and augmentation. There is suboptimal visualization of the deep veins of the right calf. IMPRESSION:  No evidence of DVT. MEDICATIONS GIVEN:  Medications - No data to display    IMPRESSION:  Cellulitis of right lower leg  (primary encounter diagnosis)    PLAN:  1. Current Discharge Medication List    START taking these medications    cephALEXin (KEFLEX) 500 mg capsule  Take 1 Cap by mouth four (4) times daily for 7 days.  Indications: Skin and Skin Structure Infection  Qty: 28 Cap Refills: 0      CONTINUE these medications which have NOT CHANGED    carBAMazepine XR (TEGRETOL XR) 200 mg SR tablet  TAKE ONE TABLET TWICE A DAY *BRAND PREF*  Qty: 60 Tab Refills: 5    DEPAKOTE  mg ER tablet  TAKE THREE (3) TABLETS DAILY *BRAND MEDICALLY NECESSARY*  Qty: 90 Tab Refills: 5    cetaphil (CETAPHIL) topical cream  Apply  to affected area as needed for Dry Skin.    hydrocortisone valerate (WESTCORT) 0.2 % topical cream  Apply  to affected area two (2) times a day. use thin layer    clobetasol (CLOBEX) 0.05 % lotion  Apply  to affected area two (2) times a day. sennosides (SENNA) 8.6 mg cap  Take  by mouth. cholecalciferol (VITAMIN D3) 400 unit tab tablet  Take  by mouth daily. loratadine (CLARITIN) 10 mg tablet  Take 10 mg by mouth daily. Fesoterodine (TOVIAZ) 4 mg SR tablet  Take 8 mg by mouth daily. polyethylene glycol (MIRALAX) 17 gram packet  Take 17 g by mouth daily. ferrous sulfate (IRON) 325 mg (65 mg iron) tablet  Take  by mouth Daily (before breakfast). !! risperiDONE (RISPERDAL) 2 mg tablet  Take 2 mg by mouth daily. Take 1 tab in the am and 2 tabs pm    simvastatin (ZOCOR) 40 mg tablet  Take  by mouth nightly. levothyroxine (SYNTHROID) 75 mcg tablet  Take  by mouth Daily (before breakfast). lisinopril (PRINIVIL, ZESTRIL) 5 mg tablet  Take  by mouth daily. therapeutic multivitamin (THERAGRAN) tablet  Take 1 Tab by mouth daily. tiZANidine (ZANAFLEX) 2 mg capsule  Take 2 mg by mouth three (3) times daily. fluvoxaMINE (LUVOX) 50 mg tablet  Take  by mouth two (2) times a day. !! risperiDONE (RISPERDAL) 4 mg tablet  Take  by mouth.      etanercept (ENBREL) 50 mg/mL (0.98 mL) injection  by SubCUTAneous route. !! - Potential duplicate medications found. Please discuss with provider. 2. Follow-up Information     Follow up With Details Comments [address], [place]  Keep scheduled appointment [phone] [id number] [phone] [address] [facility] DEPT  As needed, If symptoms worsen [phone] [facility]  [phone]      3. Keep appointment with PCP & Ortho this week.      Return to ED for new or worsening symptoms       Tien Edmonds NP        ED Course     Procedures    Right lower extremity

## 2017-08-28 NOTE — ED TRIAGE NOTES
Pt was wheeled to the treatment area accompanied by his care giver. Care giver states Sana Gonzalez has had skin issues with swelling to his right leg off and on since April secondary to a brace he uses we have tried 2 different ones but they have both caused problems. He has had redness to right mid shin area since Friday. There has been no fever he has not complained of pain. \"

## 2017-11-15 ENCOUNTER — OFFICE VISIT (OUTPATIENT)
Dept: NEUROLOGY | Age: 57
End: 2017-11-15

## 2017-11-15 VITALS — BODY MASS INDEX: 27 KG/M2 | WEIGHT: 172 LBS | HEIGHT: 67 IN

## 2017-11-15 DIAGNOSIS — G40.209 PARTIAL EPILEPSY WITH IMPAIRMENT OF CONSCIOUSNESS (HCC): Primary | ICD-10-CM

## 2017-11-15 RX ORDER — CARBAMAZEPINE 200 MG/1
TABLET, EXTENDED RELEASE ORAL
Qty: 60 TAB | Refills: 11 | Status: SHIPPED | OUTPATIENT
Start: 2017-11-15 | End: 2018-11-13 | Stop reason: SDUPTHER

## 2017-11-15 RX ORDER — DIVALPROEX SODIUM 500 MG/1
TABLET, EXTENDED RELEASE ORAL
Qty: 90 TAB | Refills: 11 | Status: SHIPPED | OUTPATIENT
Start: 2017-11-15 | End: 2018-11-28 | Stop reason: SDUPTHER

## 2017-11-15 NOTE — MR AVS SNAPSHOT
Visit Information Date & Time Provider Department Dept. Phone Encounter #  
 11/15/2017  9:00 AM RUPERTO Lindsay Neurology [facility] [phone] [id number] Follow-up Instructions Return in about 6 months (around 5/15/2018). Upcoming Health Maintenance Date Due Hepatitis C Screening 1960 Pneumococcal 19-64 Highest Risk (1 of 3 - PCV13) 7/8/1977 FOBT Q 1 YEAR AGE 50-75 7/8/2008 Influenza Age 5 to Adult 8/1/2017 DTaP/Tdap/Td series (2 - Td) 1/13/2026 Allergies as of 11/15/2017  Review Complete On: 11/15/2017 By: Leola Queen No Known Allergies Current Immunizations  Never Reviewed Name Date Tdap 1/13/2016  5:39 PM  
  
 Not reviewed this visit You Were Diagnosed With   
  
 Codes Comments Partial epilepsy with impairment of consciousness ([facility] [address]    -  Primary ICD-10-CM: J15.960 ICD-9-CM: 345.40 Vitals Height(growth percentile) Weight(growth percentile) BMI Smoking Status 5' 7\" (1.702 m) 172 lb (78 kg) 26.94 kg/m2 Never Smoker Vitals History BMI and BSA Data Body Mass Index Body Surface Area  
 26.94 kg/m 2 1.92 m 2 Preferred Pharmacy Pharmacy Name Phone [facility], [address] [phone] Your Updated Medication List  
  
   
This list is accurate as of: 11/15/17  9:22 AM.  Always use your most recent med list.  
  
  
  
  
 carBAMazepine  mg SR tablet Commonly known as:  TEGretol XR  
TAKE ONE TABLET TWICE A DAY *BRAND PREF*  
  
 CETAPHIL topical cream  
Generic drug:  cetaphil Apply  to affected area as needed for Dry Skin. cholecalciferol 400 unit Tab tablet Commonly known as:  VITAMIN D3 Take  by mouth daily. clobetasol 0.05 % lotion Commonly known as:  Vergia Kocher Apply  to affected area two (2) times a day. DEPAKOTE  mg ER tablet Generic drug:  divalproex ER  
 TAKE THREE (3) TABLETS DAILY *BRAND MEDICALLY NECESSARY*  
  
 ENBREL 50 mg/mL (0.98 mL) injection Generic drug:  etanercept  
by SubCUTAneous route. fluvoxaMINE 50 mg tablet Commonly known as:  Laura Gent Take  by mouth two (2) times a day. hydrocortisone valerate 0.2 % topical cream  
Commonly known as:  [employer] Apply  to affected area two (2) times a day. use thin layer Iron 325 mg (65 mg iron) tablet Generic drug:  ferrous sulfate Take  by mouth Daily (before breakfast). levothyroxine 75 mcg tablet Commonly known as:  SYNTHROID Take  by mouth Daily (before breakfast). lisinopril 5 mg tablet Commonly known as:  Nanne Shows Take  by mouth daily. loratadine 10 mg tablet Commonly known as:  Evelyn Freedom Take 10 mg by mouth daily. MIRALAX 17 gram packet Generic drug:  polyethylene glycol Take 17 g by mouth daily. * RisperDAL 2 mg tablet Generic drug:  risperiDONE Take 2 mg by mouth daily. Take 1 tab in the am and 2 tabs pm  
  
 * RisperDAL 4 mg tablet Generic drug:  risperiDONE Take  by mouth. Senna 8.6 mg Cap Generic drug:  sennosides Take  by mouth. simvastatin 40 mg tablet Commonly known as:  ZOCOR Take  by mouth nightly. therapeutic multivitamin tablet Commonly known as:  [facility] Take 1 Tab by mouth daily. tiZANidine 2 mg capsule Commonly known as:  [place] Rink Take 2 mg by mouth three (3) times daily. TOVIAZ 4 mg SR tablet Generic drug:  Fesoterodine Take 8 mg by mouth daily. * Notice: This list has 2 medication(s) that are the same as other medications prescribed for you. Read the directions carefully, and ask your doctor or other care provider to review them with you. Prescriptions Sent to Pharmacy Refills  
 carBAMazepine XR (TEGRETOL XR) 200 mg SR tablet 11 Sig: TAKE ONE TABLET TWICE A DAY *BRAND PREF*  Class: Normal  
 Pharmacy: [address] [place] [address], [address] [place] [address] [address] Ph #: [phone] DEPAKOTE  mg ER tablet 11 Sig: TAKE THREE (3) TABLETS DAILY *BRAND MEDICALLY NECESSARY* Class: Normal  
 Pharmacy: [address] [place] [address], [address] Ph #: [phone] Follow-up Instructions Return in about 6 months (around 5/15/2018). Introducing [facility]! [facility] introduces Fixmo patient portal. Now you can access parts of your medical record, email your doctor's office, and request medication refills online. 1. In your internet browser, go to [email or web address] 2. Click on the First Time User? Click Here link in the Sign In box. You will see the New Member Sign Up page. 3. Enter your Fixmo Access Code exactly as it appears below. You will not need to use this code after youve completed the sign-up process. If you do not sign up before the expiration date, you must request a new code. · Fixmo Access Code: [email or web address] Expires: 11/23/2017 11:53 AM 
 
4. Enter the last four digits of your Social Security Number (xxxx) and Date of Birth (mm/dd/yyyy) as indicated and click Submit. You will be taken to the next sign-up page. 5. Create a Fixmo ID. This will be your Fixmo login ID and cannot be changed, so think of one that is secure and easy to remember. 6. Create a Fixmo password. You can change your password at any time. 7. Enter your Password Reset Question and Answer. This can be used at a later time if you forget your password. 8. Enter your e-mail address. You will receive e-mail notification when new information is available in [address] 9. Click Sign Up. You can now view and download portions of your medical record. 10. Click the Download Summary menu link to download a portable copy of your medical information. If you have questions, please visit the Frequently Asked Questions section of the iLinkt website. Remember, [employer] is NOT to be used for urgent needs. For medical emergencies, dial 911. Now available from your iPhone and Android! Please provide this summary of care documentation to your next provider. Your primary care clinician is listed as Daisy Dudley. If you have any questions after today's visit, please call [phone].

## 2017-11-15 NOTE — PROGRESS NOTES
Kaitlin Stephens is a 59 y.o. male who presents with the following  Chief Complaint   Patient presents with    Follow-up       HPI Patient comes in for a follow up for seizures with caregiver. He is currently on Depakote 500 mg ER three tablets daily and Tegretol  mg BID and doing well. Had not noticed any tremors at all since decreasing depakote. Last seizure was 13 years ago or longer. Attends day support at [address] [place] [address] and interacting well with other members. He is not having any trouble with eating or sleeping. Aid has not noticed any seizure activity. Taking medications without any issues. He has not fallen since the summer. Went to the ER a few times for ankle swelling. No Known Allergies    Current Outpatient Prescriptions   Medication Sig    carBAMazepine XR (TEGRETOL XR) 200 mg SR tablet TAKE ONE TABLET TWICE A DAY *BRAND PREF*    DEPAKOTE  mg ER tablet TAKE THREE (3) TABLETS DAILY *BRAND MEDICALLY NECESSARY*    cetaphil (CETAPHIL) topical cream Apply  to affected area as needed for Dry Skin.  hydrocortisone valerate (WESTCORT) 0.2 % topical cream Apply  to affected area two (2) times a day. use thin layer    clobetasol (CLOBEX) 0.05 % lotion Apply  to affected area two (2) times a day.  sennosides (SENNA) 8.6 mg cap Take  by mouth.  cholecalciferol (VITAMIN D3) 400 unit tab tablet Take  by mouth daily.  loratadine (CLARITIN) 10 mg tablet Take 10 mg by mouth daily.  Fesoterodine (TOVIAZ) 4 mg SR tablet Take 8 mg by mouth daily.  polyethylene glycol (MIRALAX) 17 gram packet Take 17 g by mouth daily.  ferrous sulfate (IRON) 325 mg (65 mg iron) tablet Take  by mouth Daily (before breakfast).  risperiDONE (RISPERDAL) 2 mg tablet Take 2 mg by mouth daily. Take 1 tab in the am and 2 tabs pm    simvastatin (ZOCOR) 40 mg tablet Take  by mouth nightly.  levothyroxine (SYNTHROID) 75 mcg tablet Take  by mouth Daily (before breakfast).       lisinopril (PRINIVIL, ZESTRIL) 5 mg tablet Take  by mouth daily.  therapeutic multivitamin (THERAGRAN) tablet Take 1 Tab by mouth daily.  tiZANidine (ZANAFLEX) 2 mg capsule Take 2 mg by mouth three (3) times daily.  fluvoxaMINE (LUVOX) 50 mg tablet Take  by mouth two (2) times a day.  risperiDONE (RISPERDAL) 4 mg tablet Take  by mouth.  etanercept (ENBREL) 50 mg/mL (0.98 mL) injection by SubCUTAneous route. No current facility-administered medications for this visit. History   Smoking Status    Never Smoker   Smokeless Tobacco    Never Used       Past Medical History:   Diagnosis Date    Cerebral palsy ([facility] [address]     CP    Developmental delay     Diabetes ([facility] [address] [facility]     Hypothyroidism     Mental retardation     Mild    Psoriasis     Psychiatric disorder     Seizures ([facility] [address]        History reviewed. No pertinent surgical history. Family History   Problem Relation Age of Onset    Family history unknown: Yes       Social History     Social History    Marital status: SINGLE     Spouse name: N/A    Number of children: N/A    Years of education: N/A     Social History Main Topics    Smoking status: Never Smoker    Smokeless tobacco: Never Used    Alcohol use No    Drug use: Yes     Special: Prescription    Sexual activity: Not Asked     Other Topics Concern    None     Social History Narrative       Review of Systems   HENT: Negative for ear pain, hearing loss and tinnitus. Eyes: Negative for blurred vision and double vision. Gastrointestinal: Negative for nausea and vomiting. Neurological: Positive for weakness. Negative for seizures and loss of consciousness. Remainder of comprehensive review is negative. Physical Exam :    Visit Vitals    Ht 5' 7\" (1.702 m)    Wt 78 kg (172 lb)    BMI 26.94 kg/m2       General: Well defined, nourished, and groomed individual in no acute distress.    Neck: Supple, nontender, no bruits, no pain with resistance to active range of motion.    Heart: Regular rate and rhythm, no murmurs, rub, or gallop. Normal S1S2. Lungs: Clear to auscultation bilaterally with equal chest expansion, no cough, no wheeze  Musculoskeletal: Extremities revealed no edema and had full range of motion of joints except RLE has splint   Psych: unable to assess       NEUROLOGICAL EXAMINATION:    Mental Status: unable to assess       Cranial Nerves:    II, III, IV, VI: did not track due to mental status. Pupils are equal, round, and reactive to light and accommodation.        V-XII: Hearing is grossly intact. Lucho Diaz  Motor Examination: will not perform       Coordination:would not perform       Gait and Station: Steady while walking      Reflexes: DTRs 1+ throughout. Results for orders placed or performed during the hospital encounter of 06/17/17   CULTURE, BLOOD, PAIRED   Result Value Ref Range    Special Requests: NO SPECIAL REQUESTS      Culture result: NO GROWTH 6 DAYS     CBC WITH AUTOMATED DIFF   Result Value Ref Range    WBC 31.6 (H) 4.1 - 11.1 K/uL    RBC 3.79 (L) 4.10 - 5.70 M/uL    HGB 13.0 12.1 - 17.0 g/dL    HCT 37.3 36.6 - 50.3 %    MCV 98.4 80.0 - 99.0 FL    MCH 34.3 (H) 26.0 - 34.0 PG    MCHC 34.9 30.0 - 36.5 g/dL    RDW 11.5 11.5 - 14.5 %    PLATELET 666 021 - 267 K/uL    NEUTROPHILS 83 %    BAND NEUTROPHILS 7 %    LYMPHOCYTES 0 %    MONOCYTES 10 %    EOSINOPHILS 0 %    BASOPHILS 0 %    ABS. NEUTROPHILS 28.4 K/UL    ABS. LYMPHOCYTES 0.0 K/UL    ABS. MONOCYTES 3.2 K/UL    ABS. EOSINOPHILS 0.0 K/UL    ABS. BASOPHILS 0.0 K/UL    DF MANUAL      RBC COMMENTS NORMOCYTIC, NORMOCHROMIC      Pathologist review       Normocytic, normochromic RBCs. Leukocytosis with moderate neutrophilia and monocytosis. Unremarkable platelets.  Reviewed by Dr. Ozzie Salcedo 9/64/56 (RF)   METABOLIC PANEL, COMPREHENSIVE   Result Value Ref Range    Sodium 131 (L) 136 - 145 mmol/L    Potassium 4.3 3.5 - 5.1 mmol/L    Chloride 95 (L) 97 - 108 mmol/L    CO2 29 21 - 32 mmol/L    Anion gap 7 5 - 15 mmol/L    Glucose 122 (H) 65 - 100 mg/dL    BUN 17 6 - 20 MG/DL    Creatinine 1.37 (H) 0.70 - 1.30 MG/DL    BUN/Creatinine ratio 12 12 - 20      GFR est AA >60 >60 ml/min/1.73m2    GFR est non-AA 53 (L) >60 ml/min/1.73m2    Calcium 8.9 8.5 - 10.1 MG/DL    Bilirubin, total 0.5 0.2 - 1.0 MG/DL    ALT (SGPT) 25 12 - 78 U/L    AST (SGOT) 72 (H) 15 - 37 U/L    Alk. phosphatase 57 45 - 117 U/L    Protein, total 7.7 6.4 - 8.2 g/dL    Albumin 3.1 (L) 3.5 - 5.0 g/dL    Globulin 4.6 (H) 2.0 - 4.0 g/dL    A-G Ratio 0.7 (L) 1.1 - 2.2     TSH 3RD GENERATION   Result Value Ref Range    TSH 2.03 0.36 - 3.74 uIU/mL   VALPROIC ACID   Result Value Ref Range    Valproic acid 80 50 - 100 ug/ml   AMMONIA   Result Value Ref Range    Ammonia 11 <32 UMOL/L   LACTIC ACID, PLASMA   Result Value Ref Range    Lactic acid 2.1 (HH) 0.4 - 2.0 MMOL/L       Orders Placed This Encounter    carBAMazepine XR (TEGRETOL XR) 200 mg SR tablet     Sig: TAKE ONE TABLET TWICE A DAY *BRAND PREF*     Dispense:  60 Tab     Refill:  11    DEPAKOTE  mg ER tablet     Sig: TAKE THREE (3) TABLETS DAILY *BRAND MEDICALLY NECESSARY*     Dispense:  90 Tab     Refill:  11       1. Partial epilepsy with impairment of consciousness ([facility] [address]        Follow-up Disposition:  Return in about 6 months (around 5/15/2018). Partial epilepsy is stable on Depakote 500 mg ER TID and Tegretol  mg BID. Keep these as he has been seizure free for over 14 years. He is to continue to stay active. Had blood through PCP in August and did seizure labs. Call with any concerns.      This note will not be viewable in [facility]

## 2017-11-27 ENCOUNTER — TELEPHONE (OUTPATIENT)
Dept: NEUROLOGY | Age: 57
End: 2017-11-27

## 2017-12-14 ENCOUNTER — HOSPITAL ENCOUNTER (EMERGENCY)
Age: 57
Discharge: HOME | End: 2017-12-14
Attending: EMERGENCY MEDICINE
Payer: MEDICAID

## 2017-12-14 VITALS
DIASTOLIC BLOOD PRESSURE: 75 MMHG | SYSTOLIC BLOOD PRESSURE: 165 MMHG | TEMPERATURE: 96.7 F | BODY MASS INDEX: 25.56 KG/M2 | WEIGHT: 163.2 LBS | HEART RATE: 98 BPM | RESPIRATION RATE: 18 BRPM | OXYGEN SATURATION: 98 %

## 2017-12-14 DIAGNOSIS — S00.11XA CONTUSION OF RIGHT EYEBROW, INITIAL ENCOUNTER: Primary | ICD-10-CM

## 2017-12-14 PROCEDURE — 99282 EMERGENCY DEPT VISIT SF MDM: CPT

## 2017-12-14 NOTE — DISCHARGE INSTRUCTIONS
Bruised Face: Care Instructions  Your Care Instructions  You can get a bruise on your face if you fall or if something hits you in the face. The medical term for a bruise is \"contusion. \" Small blood vessels get torn and leak blood under the skin. Most people think of a bruise as a black-and-blue spot. But bones and muscles can also get bruised. This may damage deep tissues but not cause a bruise you can see. Your doctor will examine you and will gently press on your face to find areas that are tender. He or she will check your eyes, how well you can move face muscles near the bruise, and your feeling around the area to make sure there isn't a more serious injury, such as a broken bone or nerve damage. You may have tests, including X-rays or other imaging tests like a CT scan or an MRI. Bruises may cause pain and swelling. But if there is no other damage, they will usually get better in a few weeks with home treatment. Follow-up care is a key part of your treatment and safety. Be sure to make and go to all appointments, and call your doctor if you are having problems. It's also a good idea to know your test results and keep a list of the medicines you take. How can you care for yourself at home? · Put ice or a cold pack on your face for 10 to 20 minutes at a time. Put a thin cloth between the ice and your skin. Try to do this every 1 to 2 hours for the first 3 days (when you are awake) or until the swelling goes down. · Sleep with your head slightly raised until the swelling goes down. Prop up your head and shoulders on pillows. · If you play contact sports, ask your doctor when it's okay to play again. It's safest to wait at least 6 weeks. · Be safe with medicines. Read and follow all instructions on the label. ¨ If the doctor gave you a prescription medicine for pain, take it as prescribed.   ¨ If you are not taking a prescription pain medicine, ask your doctor if you can take an over-the-counter medicine. When should you call for help? Call your doctor now or seek immediate medical care if:  · Your pain gets worse. · You have new or worse swelling. · You have new or worse bleeding. · The area near the bruise is tingly, weak, or numb. · You have vision changes. Watch closely for changes in your health, and be sure to contact your doctor if:  · You do not get better as expected. Where can you learn more? Go to http://negrita-edie.info/. Enter T266 in the search box to learn more about \"Bruised Face: Care Instructions. \"  Current as of: June 19, 2017  Content Version: 11.4  © 9468-0067 [employer]. Care instructions adapted under license by [employer] (which disclaims liability or warranty for this information). If you have questions about a medical condition or this instruction, always ask your healthcare professional. [address] any warranty or liability for your use of this information.

## 2017-12-14 NOTE — LETTER
[facility] EMERGENCY DEPT 
[address] [facility] [address] [id number] 
[phone] Work/School Note Date: 12/14/2017 To Whom It May concern: 
 
Suki Fuller was seen and treated today in the emergency room by the following provider(s): 
Attending Provider: Elisabeth Colindres MD 
Physician Assistant: Nunu Pandya PA-C. Suki Fuller is able to return to his normal activities without restrictions Sincerely, Nunu Pandya PA-C

## 2017-12-14 NOTE — ED PROVIDER NOTES
HPI Comments: Adelso Dallas is a 59 y.o. male with hx significant for cerebral palsy, mild MR who presents with day support counselor to ER with c/o bruise above right eye to right eyebrow/eyelid x this morning. Notes pt did not have bruise last evening before bed however noticed bruise this morning after waking up. No known fall. Notes pt is rarely alone due to his fall risk and frequently checked on while sleeping as well and no reported incident that could have caused bruise. Pt has been acting his normal self per caregiver. Eating/drinking all day without difficulty. No vomiting. No other bruises noted or injury. Hx otherwise limited secondary to pts baseline MR.     PCP: Apolonia Byers MD   PMHx significant for: Past Medical History:  No date: Cerebral palsy ([address]      Comment: CP  No date: Developmental delay  No date: Diabetes ([address]  No date: Hypothyroidism  No date: Mental retardation      Comment: Mild  No date: Psoriasis  No date: Psychiatric disorder  No date: Seizures ([address]    PSHx significant for: History reviewed. No pertinent surgical history. No Known Allergies    There are no other complaints, changes or physical findings at this time. The history is provided by a caregiver. Past Medical History:   Diagnosis Date    Cerebral palsy ([address]     CP    Developmental delay     Diabetes ([address]     Hypothyroidism     Mental retardation     Mild    Psoriasis     Psychiatric disorder     Seizures ([address]        History reviewed. No pertinent surgical history. Family History:   Problem Relation Age of Onset    Family history unknown: Yes       Social History     Social History    Marital status: SINGLE     Spouse name: N/A    Number of children: N/A    Years of education: N/A     Occupational History    Not on file.      Social History Main Topics    Smoking status: Never Smoker    Smokeless tobacco: Never Used    Alcohol use No    Drug use: Yes     Special: Prescription    Sexual activity: Not on file     Other Topics Concern    Not on file     Social History Narrative         ALLERGIES: Review of patient's allergies indicates no known allergies. Review of Systems   Unable to perform ROS: Psychiatric disorder (hx MR)   Skin:        Bruise to right eyebrow/lid   hx limited secondary to pts baseline MR. Jacky CartagenaMELISSA     Vitals:    12/14/17 1317   BP: 165/75   Pulse: 98   Resp: 18   SpO2: 98%   Weight: 74 kg (163 lb 3.2 oz)            Physical Exam   Constitutional: He appears well-developed and well-nourished. No distress. HENT:   Head: Normocephalic and atraumatic. Right Ear: Hearing, tympanic membrane, external ear and ear canal normal. No hemotympanum. Left Ear: Hearing, tympanic membrane, external ear and ear canal normal. No hemotympanum. Nose: Nose normal.   Mouth/Throat: Uvula is midline, oropharynx is clear and moist and mucous membranes are normal.   Eyes: Conjunctivae and EOM are normal. Pupils are equal, round, and reactive to light. Neck: Normal range of motion. Cardiovascular: Normal rate and normal heart sounds. Exam reveals no gallop and no friction rub. No murmur heard. Pulmonary/Chest: Effort normal and breath sounds normal. No accessory muscle usage. No respiratory distress. He has no decreased breath sounds. He has no wheezes. He has no rhonchi. He has no rales. He exhibits no tenderness. Musculoskeletal: Normal range of motion. Neurological: He is alert. Baseline MR   Skin: Skin is warm and dry. No rash noted. He is not diaphoretic. Psychiatric: He has a normal mood and affect. His behavior is normal.   Nursing note and vitals reviewed.        MDM  Number of Diagnoses or Management Options  Contusion of right eyebrow, initial encounter:   Diagnosis management comments: DDx: contusion, hematoma, eye injury, head injury       Amount and/or Complexity of Data Reviewed  Obtain history from someone other than the patient: yes (Day support counselor)  Discuss the patient with other providers: yes Melba Presser )    Patient Progress  Patient progress: stable    ED Course       Procedures                       1:24 PM   Jacques Gandara PA-C discussed patient with Lorena Huynh MD who is in agreement with care plan as outlined. No further recommendations. Jacques Gandara PA-C          1:32 PM  Pt has been reevaluated. There are no new complaints, changes, or physical findings at this time. Medications have been reviewed w/ pt and/or family. Pt and/or family's questions have been answered. Pt and/or family expressed good understanding of the dx/tx/rx and is in agreement with plan of care. Pt instructed and agreed to f/u w/ PCP and to return to ED upon further deterioration. Pt is ready for discharge. LABORATORY TESTS:  No results found for this or any previous visit (from the past 12 hour(s)). IMAGING RESULTS:  No orders to display     No results found. MEDICATIONS GIVEN:  Medications - No data to display    IMPRESSION:  1. Contusion of right eyebrow, initial encounter        PLAN:  1. 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      hydrocortisone valerate (WESTCORT) 0.2 % topical cream Apply  to affected area two (2) times a day. use thin layer      clobetasol (CLOBEX) 0.05 % lotion Apply  to affected area two (2) times a day. sennosides (SENNA) 8.6 mg cap Take  by mouth. cholecalciferol (VITAMIN D3) 400 unit tab tablet Take  by mouth daily. loratadine (CLARITIN) 10 mg tablet Take 10 mg by mouth daily. Fesoterodine (TOVIAZ) 4 mg SR tablet Take 8 mg by mouth daily. polyethylene glycol (MIRALAX) 17 gram packet Take 17 g by mouth daily. ferrous sulfate (IRON) 325 mg (65 mg iron) tablet Take  by mouth Daily (before breakfast). !! risperiDONE (RISPERDAL) 2 mg tablet Take 2 mg by mouth daily. Take 1 tab in the am and 2 tabs pm      simvastatin (ZOCOR) 40 mg tablet Take  by mouth nightly. levothyroxine (SYNTHROID) 75 mcg tablet Take  by mouth Daily (before breakfast). lisinopril (PRINIVIL, ZESTRIL) 5 mg tablet Take  by mouth daily. therapeutic multivitamin (THERAGRAN) tablet Take 1 Tab by mouth daily. tiZANidine (ZANAFLEX) 2 mg capsule Take 2 mg by mouth three (3) times daily. fluvoxaMINE (LUVOX) 50 mg tablet Take  by mouth two (2) times a day. !! risperiDONE (RISPERDAL) 4 mg tablet Take  by mouth.        etanercept (ENBREL) 50 mg/mL (0.98 mL) injection by SubCUTAneous route. !! - Potential duplicate medications found. Please discuss with provider.         2.   Follow-up Information     Follow up With Details Comments [address], [place] Go to as scheduled tomorrow [address]      [address] [facility] DEPT  If symptoms worsen [address] [facility]  [phone]            Return to ED if worse

## 2017-12-14 NOTE — ED TRIAGE NOTES
Pt ambulatory to treatment area with day support counselor with c/o Shane Cervantes has a bruise above his right eye that we noticed this morning. \"  \"we don't know how it happened but it wasn't there last night. \"  Pt is acting like himself, no complaints of pain, eating and drinking normally.

## 2018-05-11 ENCOUNTER — HOSPITAL ENCOUNTER (EMERGENCY)
Age: 58
Discharge: HOME | End: 2018-05-11
Attending: EMERGENCY MEDICINE
Payer: MEDICAID

## 2018-05-11 VITALS
RESPIRATION RATE: 18 BRPM | TEMPERATURE: 97.2 F | OXYGEN SATURATION: 97 % | SYSTOLIC BLOOD PRESSURE: 103 MMHG | DIASTOLIC BLOOD PRESSURE: 57 MMHG | BODY MASS INDEX: 25.22 KG/M2 | WEIGHT: 160.72 LBS | HEART RATE: 69 BPM | HEIGHT: 67 IN

## 2018-05-11 DIAGNOSIS — S51.011A LACERATION OF SKIN OF RIGHT ELBOW, INITIAL ENCOUNTER: Primary | ICD-10-CM

## 2018-05-11 PROCEDURE — 75810000293 HC SIMP/SUPERF WND  RPR

## 2018-05-11 PROCEDURE — 77030039266 HC ADH SKN EXOFIN S2SG -A

## 2018-05-11 PROCEDURE — 99282 EMERGENCY DEPT VISIT SF MDM: CPT

## 2018-05-11 RX ORDER — TAMSULOSIN HYDROCHLORIDE 0.4 MG/1
0.4 CAPSULE ORAL DAILY
COMMUNITY

## 2018-05-11 NOTE — DISCHARGE INSTRUCTIONS
Cuts Closed With Adhesives: Care Instructions  Your Care Instructions  A cut can happen anywhere on your body. The doctor used an adhesive to close the cut. When the adhesive dries, it forms a film that holds the edges of the cut together. Skin adhesives are sometimes called liquid stitches. If the cut went deep and through the skin, the doctor may have put in a layer of stitches below the adhesive. The deeper layer of stitches brings the deep part of the cut together. These stitches will dissolve and don't need to be removed. You don't see the stitches, only the adhesive. You may have a bandage. The doctor has checked you carefully, but problems can develop later. If you notice any problems or new symptoms, get medical treatment right away. Follow-up care is a key part of your treatment and safety. Be sure to make and go to all appointments, and call your doctor if you are having problems. It's also a good idea to know your test results and keep a list of the medicines you take. How can you care for yourself at home? · Keep the cut dry for the first 24 to 48 hours. After this, you can shower if your doctor okays it. Pat the cut dry. · Don't soak the cut, such as in a bathtub. Your doctor will tell you when it's safe to get the cut wet. · If your doctor told you how to care for your cut, follow your doctor's instructions. If you did not get instructions, follow this general advice:  ¨ Do not put any kind of ointment, cream, or lotion over the area. This can make the adhesive fall off too soon. ¨ After the first 24 to 48 hours, wash around the cut with clean water 2 times a day. Do not use hydrogen peroxide or alcohol, which can slow healing. ¨ If the doctor told you to use a bandage, put on a new bandage after cleaning the cut or if the bandage gets wet or dirty. · Prop up the sore area on a pillow anytime you sit or lie down during the next 3 days. Try to keep it above the level of your heart. This will help reduce swelling. · Leave the skin adhesive on your skin until it falls off on its own. This may take 5 to 10 days. · Do not scratch, rub, or pick at the adhesive. · Do not put the sticky part of a bandage directly on the adhesive. · Avoid any activity that could cause your cut to reopen. · Be safe with medicines. Read and follow all instructions on the label. ¨ If the doctor gave you a prescription medicine for pain, take it as prescribed. ¨ If you are not taking a prescription pain medicine, ask your doctor if you can take an over-the-counter medicine. When should you call for help? Call your doctor now or seek immediate medical care if:  ? · You have new pain, or your pain gets worse. ? · The skin near the cut is cold or pale or changes color. ? · You have tingling, weakness, or numbness near the cut.   ? · The cut starts to bleed. ? · You have trouble moving the area near the cut.   ? · You have symptoms of infection, such as:  ¨ Increased pain, swelling, warmth, or redness around the cut. ¨ Red streaks leading from the cut. ¨ Pus draining from the cut. ¨ A fever. ? Watch closely for changes in your health, and be sure to contact your doctor if:  ? · The cut reopens. ? · You do not get better as expected. Where can you learn more? Go to http://negrita-edie.info/. Enter P174 in the search box to learn more about \"Cuts Closed With Adhesives: Care Instructions. \"  Current as of: March 20, 2017  Content Version: 11.4  © 0812-0806 [employer]. Care instructions adapted under license by [employer] (which disclaims liability or warranty for this information). If you have questions about a medical condition or this instruction, always ask your healthcare professional. [address] any warranty or liability for your use of this information.

## 2018-05-11 NOTE — ED TRIAGE NOTES
Pt ambulated to the treatment area with his normal gait and a right leg brace on. Pt is accompanied by his caregiver from [address] [place] [address]. Care giver states \"He awoke this morning we found blood on his sheets coming from the right elbow there is a small opening we do not know when or how it happened. I brought him to have it checked. \"

## 2018-06-11 ENCOUNTER — OFFICE VISIT (OUTPATIENT)
Dept: NEUROLOGY | Age: 58
End: 2018-06-11

## 2018-06-11 VITALS
WEIGHT: 167 LBS | HEIGHT: 67 IN | BODY MASS INDEX: 26.21 KG/M2 | OXYGEN SATURATION: 98 % | SYSTOLIC BLOOD PRESSURE: 106 MMHG | HEART RATE: 66 BPM | TEMPERATURE: 96.8 F | DIASTOLIC BLOOD PRESSURE: 80 MMHG

## 2018-06-11 DIAGNOSIS — F79 INTELLECTUAL DISABILITY: Primary | ICD-10-CM

## 2018-06-11 DIAGNOSIS — G40.209 COMPLEX PARTIAL SEIZURES EVOLVING TO GENERALIZED TONIC-CLONIC SEIZURES (HCC): ICD-10-CM

## 2018-06-11 NOTE — MR AVS SNAPSHOT
[address] [place] [address] [address] [facility] [id number] [phone] Patient: Mirta Go MRN: [id number] Visit Information Date & Time Provider Department Dept. Phone Encounter #  
 6/11/2018  3:40 PM Fabiola Collado MD [facility] Neurology [facility] [phone] [id number] Follow-up Instructions Return in about 1 year (around 6/11/2019). Upcoming Health Maintenance Date Due Hepatitis C Screening 1960 Pneumococcal 19-64 Highest Risk (1 of 3 - PCV13) 7/8/1977 FOBT Q 1 YEAR AGE 50-75 7/8/2008 ZOSTER VACCINE AGE 60> 5/8/2018 Influenza Age 5 to Adult 8/1/2018 DTaP/Tdap/Td series (2 - Td) 1/13/2026 Allergies as of 6/11/2018  Review Complete On: 6/11/2018 By: Fabiola Collado MD  
 No Known Allergies Current Immunizations  Never Reviewed Name Date Tdap 1/13/2016  5:39 PM  
  
 Not reviewed this visit You Were Diagnosed With   
  
 Codes Comments Intellectual disability    -  Primary ICD-10-CM: F79 
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  
 106/80 66 96.8 °F (36 °C) (Axillary) 5' 7\" (1.702 m) 167 lb (75.8 kg) 98% BMI Smoking Status 26.16 kg/m2 Never Smoker BMI and BSA Data Body Mass Index Body Surface Area  
 26.16 kg/m 2 1.89 m 2 Preferred Pharmacy Pharmacy Name Phone [facility] [address] [phone] Your Updated Medication List  
  
   
This list is accurate as of 6/11/18  4:24 PM.  Always use your most recent med list.  
  
  
  
  
 carBAMazepine  mg SR tablet Commonly known as:  TEGretol XR  
TAKE ONE TABLET TWICE A DAY *BRAND PREF*  
  
 CETAPHIL topical cream  
 Generic drug:  cetaphil Apply  to affected area as needed for Dry Skin. cholecalciferol 400 unit Tab tablet Commonly known as:  VITAMIN D3 Take  by mouth daily. clobetasol 0.05 % lotion Commonly known as:  Farrel Dryer Apply  to affected area two (2) times a day. DEPAKOTE  mg ER tablet Generic drug:  divalproex ER  
TAKE THREE (3) TABLETS DAILY *BRAND MEDICALLY NECESSARY*  
  
 ENBREL 50 mg/mL (0.98 mL) injection Generic drug:  etanercept  
by SubCUTAneous route. fluvoxaMINE 50 mg tablet Commonly known as:  Garrel Claw Take  by mouth two (2) times a day. hydrocortisone valerate 0.2 % topical cream  
Commonly known as:  [employer] Apply  to affected area two (2) times a day. use thin layer Iron 325 mg (65 mg iron) tablet Generic drug:  ferrous sulfate Take  by mouth Daily (before breakfast). levothyroxine 75 mcg tablet Commonly known as:  SYNTHROID Take  by mouth Daily (before breakfast). lisinopril 5 mg tablet Commonly known as:  Vinay Derick Take  by mouth daily. loratadine 10 mg tablet Commonly known as:  Alveta Shaniqua Take 10 mg by mouth daily. MIRALAX 17 gram packet Generic drug:  polyethylene glycol Take 17 g by mouth daily. OTHER True Track smart  System  Twice weekly ( check blood glucose) * RisperDAL 2 mg tablet Generic drug:  risperiDONE Take 2 mg by mouth daily. Take 1 tab in the am and 2 tabs pm  
  
 * RisperDAL 4 mg tablet Generic drug:  risperiDONE Take  by mouth. Senna 8.6 mg Cap Generic drug:  sennosides Take  by mouth. simvastatin 40 mg tablet Commonly known as:  ZOCOR Take  by mouth nightly. tamsulosin 0.4 mg capsule Commonly known as:  FLOMAX Take 0.4 mg by mouth daily. therapeutic multivitamin tablet Commonly known as:  [facility] Take 1 Tab by mouth daily. tiZANidine 2 mg capsule Commonly known as:  Valdemar Zavala Take 2 mg by mouth three (3) times daily. TOVIAZ 4 mg SR tablet Generic drug:  Fesoterodine Take 8 mg by mouth daily. * Notice: This list has 2 medication(s) that are the same as other medications prescribed for you. Read the directions carefully, and ask your doctor or other care provider to review them with you. Follow-up Instructions Return in about 1 year (around 6/11/2019). Patient Instructions Patient history reviewed and patient examined. No need to manipulate medicine as he has been seizure-free and if there is blood work from primary care or other sources I would love to have it. Will suggest revisit in 1 year and he looks very much like his normal baseline. Introducing [facility]! Steff Zamora introduces [employer] patient portal. Now you can access parts of your medical record, email your doctor's office, and request medication refills online. 1. In your internet browser, go to [email or web address] 2. Click on the First Time User? Click Here link in the Sign In box. You will see the New Member Sign Up page. 3. Enter your [employer] Access Code exactly as it appears below. You will not need to use this code after youve completed the sign-up process. If you do not sign up before the expiration date, you must request a new code. · [employer] Access Code: [email or web address] 
Expires: 8/9/2018  8:11 AM 
 
4. Enter the last four digits of your Social Security Number (xxxx) and Date of Birth (mm/dd/yyyy) as indicated and click Submit. You will be taken to the next sign-up page. 5. Create a MapMyFitnesst ID. This will be your [employer] login ID and cannot be changed, so think of one that is secure and easy to remember. 6. Create a [employer] password. You can change your password at any time. 7. Enter your Password Reset Question and Answer. This can be used at a later time if you forget your password. 8. Enter your e-mail address. You will receive e-mail notification when new information is available in [address] 9. Click Sign Up. You can now view and download portions of your medical record. 10. Click the Download Summary menu link to download a portable copy of your medical information. If you have questions, please visit the Frequently Asked Questions section of the [employer] website. Remember, [employer] is NOT to be used for urgent needs. For medical emergencies, dial 911. Now available from your iPhone and Android! Please provide this summary of care documentation to your next provider. Your primary care clinician is listed as Vik Wilhelm. If you have any questions after today's visit, please call [phone].

## 2018-06-11 NOTE — PROGRESS NOTES
Neurology Consult      Subjective:      Scotty Ball is a 59 y.o. male who comes in as long-term resident at home patient with intellectual disability and complex partial seizures with potential secondary generalization. Is on Depakote  mg 3 per day and Tegretol- twice daily. No recent med surg changes. Follows with psychiatry and primary care and me. According to the attendant family is no longer in the picture and I believe he has an appointed guardian? Seems to be very much at his baseline and he has some limited interaction but extremely distracted his usual.  Some of his self-limited responses are truncated but I can discern some things as the attending used to his speech and style does much better than me. He voiced no complaints and I followed him for quite a few years. Filled out the required encounter sheet and suggest a revisit in 1 year. If there is blood work that goes to things such as liver function CBC drug levels and basic metabolic would be more than glad to get those to update his overall records here. Revisit 1 year. Current Outpatient Prescriptions   Medication Sig Dispense Refill    tamsulosin (FLOMAX) 0.4 mg capsule Take 0.4 mg by mouth daily.  carBAMazepine XR (TEGRETOL XR) 200 mg SR tablet TAKE ONE TABLET TWICE A DAY *BRAND PREF* 60 Tab 11    DEPAKOTE  mg ER tablet TAKE THREE (3) TABLETS DAILY *BRAND MEDICALLY NECESSARY* 90 Tab 11    cetaphil (CETAPHIL) topical cream Apply  to affected area as needed for Dry Skin.  hydrocortisone valerate (WESTCORT) 0.2 % topical cream Apply  to affected area two (2) times a day. use thin layer      clobetasol (CLOBEX) 0.05 % lotion Apply  to affected area two (2) times a day.  sennosides (SENNA) 8.6 mg cap Take  by mouth.  cholecalciferol (VITAMIN D3) 400 unit tab tablet Take  by mouth daily.  loratadine (CLARITIN) 10 mg tablet Take 10 mg by mouth daily.       Fesoterodine (TOVIAZ) 4 mg SR tablet Take 8 mg by mouth daily.  polyethylene glycol (MIRALAX) 17 gram packet Take 17 g by mouth daily.  ferrous sulfate (IRON) 325 mg (65 mg iron) tablet Take  by mouth Daily (before breakfast).  risperiDONE (RISPERDAL) 2 mg tablet Take 2 mg by mouth daily. Take 1 tab in the am and 2 tabs pm      simvastatin (ZOCOR) 40 mg tablet Take  by mouth nightly.  levothyroxine (SYNTHROID) 75 mcg tablet Take  by mouth Daily (before breakfast).  lisinopril (PRINIVIL, ZESTRIL) 5 mg tablet Take  by mouth daily.  therapeutic multivitamin (THERAGRAN) tablet Take 1 Tab by mouth daily.  tiZANidine (ZANAFLEX) 2 mg capsule Take 2 mg by mouth three (3) times daily.  fluvoxaMINE (LUVOX) 50 mg tablet Take  by mouth two (2) times a day.  risperiDONE (RISPERDAL) 4 mg tablet Take  by mouth.  etanercept (ENBREL) 50 mg/mL (0.98 mL) injection by SubCUTAneous route. No Known Allergies  Past Medical History:   Diagnosis Date    [facility]erebral palsy ([facility]     CP    Developmental delay     Diabetes ([facility] [facility]     Hypothyroidism     Mental retardation     Mild    Psoriasis     Psychiatric disorder     [facility]       History reviewed. No pertinent surgical history. Social History     Social History    Marital status: SINGLE     Spouse name: N/A    Number of children: N/A    Years of education: N/A     Occupational History    Not on file.      Social History Main Topics    Smoking status: Never Smoker    Smokeless tobacco: Never Used    Alcohol use No    Drug use: Yes     Special: Prescription    Sexual activity: Not on file     Other Topics Concern    Not on file     Social History Narrative      Family History   Problem Relation Age of Onset    Family history unknown: Yes      Visit Vitals    /80    Pulse 66    Temp 96.8 °F (36 °C) (Axillary)  Comment: axil    Ht 5' 7\" (1.702 m)    Wt 75.8 kg (167 lb)    SpO2 98%    BMI 26.16 kg/m2        Review of Systems:   A comprehensive review of systems was negative except for that written in the HPI. Neuro Exam:     Appearance: The patient is well developed, well nourished, and unable to provide a coherent history and is in no acute distress. Mental Status: Oriented to name by inference. Can make some truncated pronouncements that are picked up fairly quickly with attendant and seems to be baseline distractible. Mood and affect appropriate. Cranial Nerves:   Intact visual fields? Fundi are benign. JONAH, EOM's full, no nystagmus, no ptosis. Facial sensation is normal. Corneal reflexes are intact. Facial movement is symmetric. Hearing is normal bilaterally. Palate is midline with normal sternocleidomastoid and trapezius muscles are normal. Tongue is midline. Motor:  5/5 strength in upper and lower proximal and distal muscles on the left and right hemiparetic features which are antigravity plus especially right arm stronger than right leg. Right leg bracing in place. . Normal bulk and tone. No fasciculations. Reflexes:   Deep tendon reflexes 2+/4 and symmetrical.   Sensory:   Normal to touch, pinprick and vibration. Gait:   Has right leg bracing and right hemiparetic style walk with assistance step to step. Tremor:   No tremor noted. Cerebellar:  No cerebellar signs present. Neurovascular:  Normal heart sounds and regular rhythm, peripheral pulses intact, and no carotid bruits. Assessment:   Intellectual disability and complex partial seizures with potential secondary generalization. Will not manipulate medicine at this time and if there is blood work done recently by primary care and related physicians would be more than glad to keep his records complete as it applies to drug levels CBC with differential liver function test  Bmp etc.      Plan:   Revisit 1 year.   Signed by :  Portia Evangelista MD

## 2018-06-11 NOTE — PATIENT INSTRUCTIONS
Patient history reviewed and patient examined. No need to manipulate medicine as he has been seizure-free and if there is blood work from primary care or other sources I would love to have it. Will suggest revisit in 1 year and he looks very much like his normal baseline.

## 2018-07-25 ENCOUNTER — HOSPITAL ENCOUNTER (EMERGENCY)
Age: 58
Discharge: HOME | End: 2018-07-25
Attending: EMERGENCY MEDICINE
Payer: MEDICAID

## 2018-07-25 VITALS
OXYGEN SATURATION: 97 % | SYSTOLIC BLOOD PRESSURE: 132 MMHG | HEART RATE: 60 BPM | DIASTOLIC BLOOD PRESSURE: 69 MMHG | WEIGHT: 161.6 LBS | TEMPERATURE: 97.5 F | HEIGHT: 65 IN | BODY MASS INDEX: 26.92 KG/M2 | RESPIRATION RATE: 15 BRPM

## 2018-07-25 DIAGNOSIS — S40.819A: Primary | ICD-10-CM

## 2018-07-25 PROCEDURE — 99282 EMERGENCY DEPT VISIT SF MDM: CPT

## 2018-07-25 RX ORDER — MUPIROCIN 20 MG/G
OINTMENT TOPICAL 3 TIMES DAILY
Qty: 22 G | Refills: 0 | Status: SHIPPED | OUTPATIENT
Start: 2018-07-25 | End: 2022-06-22

## 2018-07-25 NOTE — ED NOTES
The patient was discharged home by Dr Rosie Brown in stable condition. The patient is alert, in no respiratory distress. The patient's diagnosis, condition and treatment were explained. The patient's caregiver expressed understanding. A discharge plan has been developed. A  was not involved in the process. Aftercare instructions were given. Pt ambulatory out of the ED with family.

## 2018-07-25 NOTE — ED PROVIDER NOTES
Patient is a 59 y.o. male presenting with wound check. The history is provided by a caregiver. Wound Check    This is a new problem. Episode onset: 2 weeks ago, sores opened on left elbow in last 2-3 days. The problem occurs constantly. The problem has not changed since onset. Pain location: no pain. The patient is experiencing no pain. Pertinent negatives include no stiffness and no itching. Associated symptoms comments: Scant yellow drainage, mild redness. He has tried nothing for the symptoms. There has been a history of trauma (self-scratching). Past Medical History:   Diagnosis Date    Cerebral palsy ([facility]     CP    Developmental delay     Diabetes ([facility]     Hypothyroidism     Mental retardation     Mild    Psoriasis     Psychiatric disorder     Seizures ([facility] [facility]        History reviewed. No pertinent surgical history. Family History:   Problem Relation Age of Onset    Family history unknown: Yes       Social History     Social History    Marital status: SINGLE     Spouse name: N/A    Number of children: N/A    Years of education: N/A     Occupational History    Not on file. Social History Main Topics    Smoking status: Never Smoker    Smokeless tobacco: Never Used    Alcohol use No    Drug use: Yes     Special: Prescription    Sexual activity: Not on file     Other Topics Concern    Not on file     Social History Narrative         ALLERGIES: Review of patient's allergies indicates no known allergies. Review of Systems   Unable to perform ROS: Psychiatric disorder (MR)   Musculoskeletal: Negative for stiffness. Skin: Negative for itching. All other systems reviewed and are negative. Vitals:    07/25/18 1629   BP: 132/69   Pulse: 60   Resp: 15   Temp: 97.5 °F (36.4 °C)   SpO2: 97%   Weight: 73.3 kg (161 lb 9.6 oz)   Height: 5' 5\" (1.651 m)            Physical Exam   Constitutional: He appears well-developed and well-nourished. No distress.    HENT:   Head: Normocephalic and atraumatic. Eyes: Conjunctivae are normal.   Neck: Neck supple. No tracheal deviation present. Cardiovascular: Normal rate and regular rhythm. Pulmonary/Chest: Effort normal. No respiratory distress. Abdominal: He exhibits no distension. Musculoskeletal: Normal range of motion. Neurological: He is alert. Chronic bilateral arm contractures, mental disability at baseline   Skin: Skin is warm and dry. 3 small areas of excoriation. 1 on left distal arm with 1 cm surrounding erythema. 1 on lateral left elbow with 2 ruptured vesicles and mild surrounding erythema. On on distal left forearm with 1 cm surrounding erythema. No fluctuance, no induration, no swelling. Psychiatric: He has a normal mood and affect. Nursing note and vitals reviewed. MDM    59 y.o. male presents with left arm skin picking and small ruptured vesicles of left elbow/forearm. No evidence of abscess or cellulitis, appears to be local inflammatory response. Topical bactroban for prevention of superinfection. Plan to follow up with PCP as needed and return precautions discussed for worsening or new concerning symptoms.        ED Course       Procedures

## 2018-07-25 NOTE — ED TRIAGE NOTES
Caretaker reports that pt has been picking at a wound on his left forearm for some time and wants the wound checked for possible infection.

## 2018-07-25 NOTE — DISCHARGE INSTRUCTIONS

## 2018-11-13 DIAGNOSIS — G40.209 PARTIAL EPILEPSY WITH IMPAIRMENT OF CONSCIOUSNESS (HCC): ICD-10-CM

## 2018-11-13 RX ORDER — CARBAMAZEPINE 200 MG/1
TABLET, EXTENDED RELEASE ORAL
Qty: 60 TAB | Status: SHIPPED | OUTPATIENT
Start: 2018-11-13 | End: 2019-11-18 | Stop reason: SDUPTHER

## 2018-11-28 DIAGNOSIS — G40.209 PARTIAL EPILEPSY WITH IMPAIRMENT OF CONSCIOUSNESS (HCC): ICD-10-CM

## 2018-11-28 RX ORDER — DIVALPROEX SODIUM 500 MG/1
TABLET, EXTENDED RELEASE ORAL
Qty: 90 TAB | Refills: 11 | Status: SHIPPED | OUTPATIENT
Start: 2018-11-28 | End: 2019-10-08 | Stop reason: SDUPTHER

## 2018-11-28 NOTE — TELEPHONE ENCOUNTER
----- Message from Reggie Cedillo sent at 11/28/2018 12:13 PM EST -----  Regarding: Dr. Joshua Newton  Pt's residential Franny Larson) requested a refill on pt's Rx (\"Depakote\" pt needs the brand name, and not generic) called to [facility] (fax) [phone]. Best contact number [phone].

## 2019-01-22 ENCOUNTER — TELEPHONE (OUTPATIENT)
Dept: NEUROLOGY | Age: 59
End: 2019-01-22

## 2019-01-22 NOTE — TELEPHONE ENCOUNTER
Received request for Tegretol. Completed information and sent through Cover my meds.     PA Pending  Key# [id number]

## 2019-01-24 NOTE — TELEPHONE ENCOUNTER
See that Key# [id number] was approved as of 01/23/19 for TEGRETOL.     PA case# [id number]  Effective 01/23/19-01/23/20

## 2019-02-06 NOTE — TELEPHONE ENCOUNTER
Received hard copy from Sarah with Auth for Tegretol. Auth# [id number]  Effective 01/23/19-01/23/20.  Scanned to chart

## 2019-03-21 ENCOUNTER — HOSPITAL ENCOUNTER (EMERGENCY)
Age: 59
Discharge: HOME | End: 2019-03-21
Attending: EMERGENCY MEDICINE
Payer: MEDICAID

## 2019-03-21 VITALS
DIASTOLIC BLOOD PRESSURE: 71 MMHG | WEIGHT: 151.01 LBS | HEART RATE: 59 BPM | RESPIRATION RATE: 16 BRPM | BODY MASS INDEX: 25.13 KG/M2 | TEMPERATURE: 97.9 F | SYSTOLIC BLOOD PRESSURE: 114 MMHG | OXYGEN SATURATION: 97 %

## 2019-03-21 DIAGNOSIS — T14.8XXA ABRASION: Primary | ICD-10-CM

## 2019-03-21 PROCEDURE — 99282 EMERGENCY DEPT VISIT SF MDM: CPT

## 2019-03-21 RX ORDER — RISPERIDONE 4 MG/1
2 TABLET, FILM COATED ORAL
COMMUNITY

## 2019-03-21 NOTE — DISCHARGE INSTRUCTIONS
Thank you for allowing us to care for you today. Please follow-up with your Primary Care provider in the next 2-3 days if your symptoms do not improve. Plan for home: You should use ice or heat for your injury. You may use one or the other or alternate between the two. ICE ONLY FOR FIRST 50 HOURS after your injury! If it has been longer than 48 hours you may start with either. If you use ice: apply the ice pack in 20 minute intervals. 20 minutes on then 20 minutes off. Make sure to protect the skin to prevent frost bite. Never apply ice or a plastic bag with ice directly to the skin. If you use heat: Do NOT lay or sleep on a heating pad. You will burn your skin. Instead, use microwave style heat packs or Thermacare packs. These are safer than traditional heating pads. Monitor your skin to prevent burns. The bruising will \"slide down the face\" before settling at the jaw and disappearing. This is normal.     Come back to the ER if you have worsening symptoms, fevers over 100.9, shaking chills, nausea or vomiting. Patient Education        Learning About Returning to Activity After Injury  What's important to know about injury recovery? Recovery from an injury takes time. Healing can take longer than you want it to. You may be tempted to return to your normal activities before you have fully healed. But stressing an injury makes it take even longer to heal. And you could make your injury worse than it's ever been. An injury heals fastest when you give it the rest and special care it needs. Your doctor can help you know when you're ready to ease back into normal activity. How can you help an injury heal?  You can speed up healing by avoiding movements that make it worse. It's also important to follow your doctor's instructions. · Ask your doctor if you can take an over-the-counter pain medicine, such as acetaminophen (Tylenol), ibuprofen (Advil, Motrin), or naproxen (Aleve).  Be safe with medicines. Read and follow all instructions on the label. · Put ice or a cold pack on the area for 10 to 20 minutes at a time to ease pain. Put a thin cloth between the ice and your skin. · If your doctor gave you a splint, a pair of crutches, or a sling, use it exactly as directed. Physical or occupational therapy can help you learn how to move in new ways and to recover from an injury. If you are given exercises to do, ease into them. Start each exercise slowly. Ease off an exercise if you start to have pain. When you have a routine of exercises, do them as often and as long as prescribed. While you heal, it also helps to keep the rest of your body moving. A physical therapist can suggest other exercises or ways of doing things to keep up your strength and energy. How do you return to activity? Slowly ease back into normal activity so you don't injure yourself again. A reinjury can be harder to heal than an original injury. If you are getting back into a sport, do it step by step. A  or physical therapist can help you do this safely. Start with short, easy movements or workouts. Then slowly add more over time. · Warm up before and stretch after the activity. · Stop what you're doing if it hurts. · When you're done, use ice to prevent pain and swelling. It may help to make some changes. For example, if a sport caused tendon pain, try another one for a while. If using a tool causes pain, change your  or use the other hand. When should you call for help? Watch closely for changes in your health, and be sure to contact your doctor if:  · Your symptoms are getting worse. · You have new symptoms, such as numbness or weakness. · You do not get better as expected. Follow-up care is a key part of your treatment and safety. Be sure to make and go to all appointments, and call your doctor if you are having problems.  It's also a good idea to know your test results and keep a list of the medicines you take. Where can you learn more? Go to http://negrita-edie.info/. Enter B834 in the search box to learn more about \"Learning About Returning to Activity After Injury. \"  Current as of: September 20, 2018  Content Version: 11.9  © 4923-5315 [employer]. Care instructions adapted under license by [employer] (which disclaims liability or warranty for this information). If you have questions about a medical condition or this instruction, always ask your healthcare professional. [address] any warranty or liability for your use of this information.

## 2019-03-21 NOTE — ED TRIAGE NOTES
Patient scratched the preexisting \"baggy skin\" under his left eye this morning and by this afternoon had a purplish/ reddened area. No injury or fall noted. Patient walked from the lobby to room 7 with caregiver assistance; has a preexisting right leg brace. Does make some noises and some words

## 2019-03-21 NOTE — ED PROVIDER NOTES
Patient is a 64-year-old male with a past medical history significant for cerebral palsy, developmental delay, diabetes, hypothyroid, psoriasis on Embrel, seizure history he was ambulatory to the ER this evening with his caretaker with complaints of bruising under the left eye. Caretaker notes this morning the patient reached up and scratched under the left eye. Patient has \"baggy skin under the left eye\" that is more prominent than on the right. The caretaker noted that the scratch seem to be minimal at this morning. She notes that his skin is very fragile. The patient was taken today support for care. When he was picked up from day support she noted that the bruising looked worse. Staff today support deny any falls or trauma. The patient is at his baseline mental status. Caretaker is concerned that there may be something additionally need to do for the area of skin that is bruised. There are no further complaints at this time. Primary care provider:Ilan Figueroa MD 
 
 
The history is provided by a caregiver. The history is limited by a developmental delay. No  was used. Past Medical History:  
Diagnosis Date  Cerebral palsy ([facility] [facility] CP  Developmental delay  Diabetes ([facility]  Hypothyroidism  Mental retardation Mild  Psoriasis  Psychiatric disorder  Seizures ([facility] History reviewed. No pertinent surgical history. Family History:  
Family history unknown: Yes  
 
 
Social History Socioeconomic History  Marital status: SINGLE Spouse name: Not on file  Number of children: Not on file  Years of education: Not on file  Highest education level: Not on file Occupational History  Not on file Social Needs  Financial resource strain: Not on file  Food insecurity:  
  Worry: Not on file Inability: Not on file  Transportation needs:  
  Medical: Not on file Non-medical: Not on file Tobacco Use  
  Smoking status: Never Smoker  Smokeless tobacco: Never Used Substance and Sexual Activity  Alcohol use: No  
 Drug use: Yes Types: Prescription  Sexual activity: Not on file Lifestyle  Physical activity:  
  Days per week: Not on file Minutes per session: Not on file  Stress: Not on file Relationships  Social connections:  
  Talks on phone: Not on file Gets together: Not on file Attends Voodoo service: Not on file Active member of club or organization: Not on file Attends meetings of clubs or organizations: Not on file Relationship status: Not on file  Intimate partner violence:  
  Fear of current or ex partner: Not on file Emotionally abused: Not on file Physically abused: Not on file Forced sexual activity: Not on file Other Topics Concern  Not on file Social History Narrative  Not on file ALLERGIES: Patient has no known allergies. Review of Systems Unable to perform ROS: Patient nonverbal  
Skin: Positive for color change. Vitals:  
 03/21/19 1650 BP: 114/71 Pulse: (!) 59 Resp: 16 Temp: 97.9 °F (36.6 °C) SpO2: 97% Weight: 68.5 kg (151 lb 0.2 oz) Physical Exam  
Constitutional: Vital signs are normal. He appears well-developed and well-nourished. He is active. Non-toxic appearance. He does not have a sickly appearance. He does not appear ill. No distress. 59year old male in NAD. HENT:  
Head: Normocephalic and atraumatic. Head is without Tomlinson's sign. Ecchymosis to the area under the left eye than is non-tender. Pupils reactive. Eyes: EOM are normal.  
Neck: No tracheal deviation present. Pulmonary/Chest: Effort normal. No respiratory distress. Musculoskeletal: Normal range of motion. Neurological: He is alert. Skin: Skin is warm and dry. Psychiatric: He has a normal mood and affect.  His behavior is normal. Judgment and thought content normal.  
 Nursing note and vitals reviewed. [facility] Procedures Assessment & Plan:  
 
Orders Placed This Encounter No red flag symptoms or mechanism of injury that necessitates imaging at this time. Discussed with Alli Correa MD,ED Provider Heaven Felix NP 
03/21/19 
5:10 PM 
 
5:21 PM 
The patient has been reevaluated. The patient is ready for discharge. The patient's signs, symptoms, diagnosis, and discharge instructions have been discussed and the patient/ family has conveyed their understanding. The patient is to follow up as recommended or return to the ED should their symptoms worsen. Plan has been discussed and the patient is in agreement. LABORATORY TESTS: 
Labs Reviewed - No data to display IMAGING RESULTS: 
No results found. MEDICATIONS GIVEN: 
Medications - No data to display IMPRESSION: 
1. Abrasion PLAN: 
1. Current Discharge Medication List  
  
CONTINUE these medications which have NOT CHANGED Details  
!! risperiDONE (RISPERDAL) 4 mg tablet Take 4 mg by mouth daily (after dinner). DEPAKOTE  mg ER tablet TAKE THREE (3) TABLETS DAILY *BRAND MEDICALLY NECESSARY* Qty: 90 Tab, Refills: 11  
 Associated Diagnoses: Partial epilepsy with impairment of consciousness (HCC)  
  
carBAMazepine XR (TEGRETOL XR) 200 mg SR tablet TAKE ONE TABLET TWICE A DAY *BRAND PREF* Qty: 60 Tab, Refills: PRN Associated Diagnoses: Partial epilepsy with impairment of consciousness (HCC)  
  
tamsulosin (FLOMAX) 0.4 mg capsule Take 0.4 mg by mouth daily. cetaphil (CETAPHIL) topical cream Apply  to affected area as needed for Dry Skin.  
  
hydrocortisone valerate (WESTCORT) 0.2 % topical cream Apply  to affected area two (2) times a day. use thin layer  
  
clobetasol (CLOBEX) 0.05 % lotion Apply  to affected area two (2) times a day. sennosides (SENNA) 8.6 mg cap Take 2 Tabs by mouth daily. cholecalciferol (VITAMIN D3) 400 unit tab tablet Take  by mouth daily. loratadine (CLARITIN) 10 mg tablet Take 10 mg by mouth daily. Fesoterodine (TOVIAZ) 4 mg SR tablet Take 8 mg by mouth daily. polyethylene glycol (MIRALAX) 17 gram packet Take 17 g by mouth daily. ferrous sulfate (IRON) 325 mg (65 mg iron) tablet Take  by mouth Daily (before breakfast). !! risperiDONE (RISPERDAL) 2 mg tablet Take 2 mg by mouth daily. levothyroxine (SYNTHROID) 75 mcg tablet Take  by mouth Daily (before breakfast). lisinopril (PRINIVIL, ZESTRIL) 5 mg tablet Take  by mouth daily. therapeutic multivitamin (THERAGRAN) tablet Take 1 Tab by mouth daily. tiZANidine (ZANAFLEX) 2 mg capsule Take 2 mg by mouth three (3) times daily. fluvoxaMINE (LUVOX) 50 mg tablet Take  by mouth two (2) times a day.    
  
etanercept (ENBREL) 50 mg/mL (0.98 mL) injection by SubCUTAneous route. mupirocin (BACTROBAN) 2 % ointment Apply  to affected area three (3) times daily. Apply to area for 10 days Qty: 22 g, Refills: 0  
  
OTHER True Track smart  System  Twice weekly ( check blood glucose)  
  
simvastatin (ZOCOR) 40 mg tablet Take 20 mg by mouth nightly. !! - Potential duplicate medications found. Please discuss with provider. 2.  
Follow-up Information Follow up With Specialties Details Why Contact Info Estefania Abernathy MD Family Practice Schedule an appointment as soon as possible for a visit As needed [address] [facility] [address] [facility] 
[phone] [facility] EMERGENCY DEPT Emergency Medicine  As needed, If symptoms worsen [address] [facility] 
[phone] 3. Return to ED for new or worsening symptoms Shelby Colvin NP

## 2019-03-21 NOTE — ED NOTES
The patient was discharged home by Geo Gonzales NP in stable condition. The patient is alert and oriented, in no respiratory distress. The patient's diagnosis, condition and treatment were explained. The patient expressed understanding. A discharge plan has been developed. A  was not involved in the process. Aftercare instructions were given. Pt ambulatory out of the ED with caregiver.

## 2019-04-09 ENCOUNTER — HOSPITAL ENCOUNTER (EMERGENCY)
Age: 59
Discharge: HOME | End: 2019-04-09
Attending: EMERGENCY MEDICINE
Payer: MEDICAID

## 2019-04-09 VITALS
TEMPERATURE: 97.5 F | RESPIRATION RATE: 14 BRPM | HEART RATE: 66 BPM | DIASTOLIC BLOOD PRESSURE: 71 MMHG | SYSTOLIC BLOOD PRESSURE: 120 MMHG | OXYGEN SATURATION: 100 % | BODY MASS INDEX: 24.51 KG/M2 | WEIGHT: 147.27 LBS

## 2019-04-09 DIAGNOSIS — L08.9 INFECTED ABRASION OF KNEE, LEFT, INITIAL ENCOUNTER: ICD-10-CM

## 2019-04-09 DIAGNOSIS — S01.511A LIP LACERATION, INITIAL ENCOUNTER: Primary | ICD-10-CM

## 2019-04-09 DIAGNOSIS — S80.212A INFECTED ABRASION OF KNEE, LEFT, INITIAL ENCOUNTER: ICD-10-CM

## 2019-04-09 PROCEDURE — 99282 EMERGENCY DEPT VISIT SF MDM: CPT

## 2019-04-09 RX ORDER — CEPHALEXIN 500 MG/1
500 CAPSULE ORAL 3 TIMES DAILY
Qty: 21 CAP | Refills: 0 | Status: SHIPPED | OUTPATIENT
Start: 2019-04-09 | End: 2019-04-16

## 2019-04-09 NOTE — ED PROVIDER NOTES
The history is provided by a caregiver. The history is limited by a developmental delay (non-verbal). Skin Problem This is a new problem. Episode onset: last week. There has been no fever. Affected Location: right side of upper lip and left knee. Past Medical History:  
Diagnosis Date  Cerebral palsy ([address] CP  Developmental delay  Diabetes ([facility]  Hypothyroidism  Mental retardation Mild  Psoriasis  Psychiatric disorder  Seizures ([facility] History reviewed. No pertinent surgical history. Family History:  
Family history unknown: Yes  
 
 
Social History Socioeconomic History  Marital status: SINGLE Spouse name: Not on file  Number of children: Not on file  Years of education: Not on file  Highest education level: Not on file Occupational History  Not on file Social Needs  Financial resource strain: Not on file  Food insecurity:  
  Worry: Not on file Inability: Not on file  Transportation needs:  
  Medical: Not on file Non-medical: Not on file Tobacco Use  Smoking status: Never Smoker  Smokeless tobacco: Never Used Substance and Sexual Activity  Alcohol use: No  
 Drug use: Yes Types: Prescription  Sexual activity: Not on file Lifestyle  Physical activity:  
  Days per week: Not on file Minutes per session: Not on file  Stress: Not on file Relationships  Social connections:  
  Talks on phone: Not on file Gets together: Not on file Attends Congregational service: Not on file Active member of club or organization: Not on file Attends meetings of clubs or organizations: Not on file Relationship status: Not on file  Intimate partner violence:  
  Fear of current or ex partner: Not on file Emotionally abused: Not on file Physically abused: Not on file Forced sexual activity: Not on file Other Topics Concern  Not on file Social History Narrative  Not on file ALLERGIES: Patient has no known allergies. Review of Systems Unable to perform ROS: Patient nonverbal  
 
 
Vitals:  
 04/09/19 1421 BP: 120/71 Pulse: 66 Resp: 14 Temp: 97.5 °F (36.4 °C) SpO2: 100% Weight: 66.8 kg (147 lb 4.3 oz) Physical Exam  
HENT:  
Small superficial healing laceration to upper lip (right side) - no swelling or redness noted Pulmonary/Chest: Effort normal. No respiratory distress. Neurological: At baseline per caregiver Skin:  
Left knee with abrasion with some surrounding erythema Psychiatric: At baseline per caregiver Nursing note and vitals reviewed. MDM Number of Diagnoses or Management Options Knee abrasion, left, initial encounter:  
Lip laceration, initial encounter:  
Diagnosis management comments: Patient with superficial lip laceration - no signs of infection at this time. Left knee abrasion - possible early cellulitis - will treat with keflex and f/u with PCP in a week No EMC at this time Procedures

## 2019-04-09 NOTE — ED NOTES
Pt was discharged and care giver given instructions by Dr Isabel Dumont . Pt care giver verbalized good understanding of all discharge instructions,prescriptions and F/U care. All questions answered. Pt in stable condition on discharge.

## 2019-04-09 NOTE — DISCHARGE INSTRUCTIONS
Patient Education        Scrapes (Abrasions): Care Instructions  Your Care Instructions  Scrapes (abrasions) are wounds where your skin has been rubbed or torn off. Most scrapes do not go deep into the skin, but some may remove several layers of skin. Scrapes usually don't bleed much, but they may ooze pinkish fluid. Scrapes on the head or face may appear worse than they are. They may bleed a lot because of the good blood supply to this area. Most scrapes heal well and may not need a bandage. They usually heal within 3 to 7 days. A large, deep scrape may take 1 to 2 weeks or longer to heal. A scab may form on some scrapes. Follow-up care is a key part of your treatment and safety. Be sure to make and go to all appointments, and call your doctor if you are having problems. It's also a good idea to know your test results and keep a list of the medicines you take. How can you care for yourself at home? · If your doctor told you how to care for your wound, follow your doctor's instructions. If you did not get instructions, follow this general advice:  ? Wash the scrape with clean water 2 times a day. Don't use hydrogen peroxide or alcohol, which can slow healing. ? You may cover the scrape with a thin layer of petroleum jelly, such as Vaseline, and a nonstick bandage. ? Apply more petroleum jelly and replace the bandage as needed. · Prop up the injured area on a pillow anytime you sit or lie down during the next 3 days. Try to keep it above the level of your heart. This will help reduce swelling. · Be safe with medicines. Take pain medicines exactly as directed. ? If the doctor gave you a prescription medicine for pain, take it as prescribed. ? If you are not taking a prescription pain medicine, ask your doctor if you can take an over-the-counter medicine. When should you call for help?   Call your doctor now or seek immediate medical care if:    · You have signs of infection, such as:  ? Increased pain, swelling, warmth, or redness around the scrape. ? Red streaks leading from the scrape. ? Pus draining from the scrape. ? A fever.     · The scrape starts to bleed, and blood soaks through the bandage. Oozing small amounts of blood is normal.    Watch closely for changes in your health, and be sure to contact your doctor if the scrape is not getting better each day. Where can you learn more? Go to http://negrita-edie.info/. Enter A374 in the search box to learn more about \"Scrapes (Abrasions): Care Instructions. \"  Current as of: September 23, 2018  Content Version: 11.9  © 6605-2848 [employer]. Care instructions adapted under license by [employer] (which disclaims liability or warranty for this information). If you have questions about a medical condition or this instruction, always ask your healthcare professional. [place] [address] any warranty or liability for your use of this information. Patient Education        Cuts Left Open: Care Instructions  Your Care Instructions    A cut can happen anywhere on your body. Sometimes a cut can injure the tendons, blood vessels, or nerves. A cut may be left open instead of being closed with stitches, staples, or adhesive. A cut may be left open when it is likely to become infected, because closing it can make infection even more likely. You will probably have a bandage. The doctor may want the cut to stay open the whole time it heals. This happens with some cuts when too much time has gone by since the cut happened. Or the doctor may tell you to come back to have the cut closed in 4 to 5 days, when there is less chance of infection. If the cut stays open while healing, your scar may be larger than if the cut was closed. But you can get treatment later to make the scar smaller. The doctor has checked you carefully, but problems can develop later.  If you notice any problems or new symptoms, get medical treatment right away. Follow-up care is a key part of your treatment and safety. Be sure to make and go to all appointments, and call your doctor if you are having problems. It's also a good idea to know your test results and keep a list of the medicines you take. How can you care for yourself at home? · Keep the cut dry for the first 24 to 48 hours. After this, you can shower if your doctor okays it. Pat the cut dry. · Don't soak the cut, such as in a bathtub. Your doctor will tell you when it's safe to get the cut wet. · If your doctor told you how to care for your cut, follow your doctor's instructions. If you did not get instructions, follow this general advice:  ? After the first 24 to 48 hours, wash the cut with clean water 2 times a day. Don't use hydrogen peroxide or alcohol, which can slow healing. ? You may cover the cut with a thin layer of petroleum jelly, such as Vaseline, and a nonstick bandage. ? Apply more petroleum jelly and replace the bandage as needed. · Prop up the injured area on a pillow anytime you sit or lie down during the next 3 days. Try to keep it above the level of your heart. This will help reduce swelling. · Avoid any activity that could cause your cut to get worse. · Take pain medicines exactly as directed. ? If the doctor gave you a prescription medicine for pain, take it as prescribed. ? If you are not taking a prescription pain medicine, ask your doctor if you can take an over-the-counter medicine. When should you call for help? Call your doctor now or seek immediate medical care if:    · You have new pain, or your pain gets worse.     · The cut starts to bleed, and blood soaks through the bandage.  Oozing small amounts of blood is normal.     · The skin near the cut is cold or pale or changes color.     · You have tingling, weakness, or numbness near the cut.     · You have trouble moving the area near the cut.     · You have symptoms of infection, such as:  ? Increased pain, swelling, warmth, or redness around the cut.  ? Red streaks leading from the cut.  ? Pus draining from the cut.  ? A fever.    Watch closely for changes in your health, and be sure to contact your doctor if:    · The cut is not closing (getting smaller).     · You do not get better as expected. Where can you learn more? Go to http://negrita-edei.info/. Enter [id number] in the search box to learn more about \"Cuts Left Open: Care Instructions. \"  Current as of: September 23, 2018  Content Version: 11.9  © 5708-0854 [employer]. Care instructions adapted under license by [employer] (which disclaims liability or warranty for this information). If you have questions about a medical condition or this instruction, always ask your healthcare professional. [address] any warranty or liability for your use of this information.

## 2019-05-13 ENCOUNTER — APPOINTMENT (OUTPATIENT)
Dept: GENERAL RADIOLOGY | Age: 59
End: 2019-05-13
Attending: EMERGENCY MEDICINE
Payer: MEDICAID

## 2019-05-13 ENCOUNTER — HOSPITAL ENCOUNTER (EMERGENCY)
Age: 59
Discharge: HOME | End: 2019-05-13
Attending: EMERGENCY MEDICINE
Payer: MEDICAID

## 2019-05-13 VITALS
HEART RATE: 55 BPM | TEMPERATURE: 97.9 F | SYSTOLIC BLOOD PRESSURE: 128 MMHG | DIASTOLIC BLOOD PRESSURE: 74 MMHG | RESPIRATION RATE: 19 BRPM | OXYGEN SATURATION: 98 % | BODY MASS INDEX: 26.21 KG/M2 | HEIGHT: 67 IN | WEIGHT: 167 LBS

## 2019-05-13 DIAGNOSIS — S92.414A NONDISPLACED FRACTURE OF PROXIMAL PHALANX OF RIGHT GREAT TOE, INITIAL ENCOUNTER FOR CLOSED FRACTURE: Primary | ICD-10-CM

## 2019-05-13 DIAGNOSIS — L03.031 CELLULITIS OF RIGHT TOE: ICD-10-CM

## 2019-05-13 DIAGNOSIS — E87.1 HYPONATREMIA: ICD-10-CM

## 2019-05-13 DIAGNOSIS — D72.819 LEUKOPENIA, UNSPECIFIED TYPE: ICD-10-CM

## 2019-05-13 LAB
ANION GAP SERPL CALC-SCNC: 5 MMOL/L (ref 5–15)
BASOPHILS # BLD: 0 K/UL (ref 0–0.1)
BASOPHILS NFR BLD: 0 % (ref 0–1)
BUN SERPL-MCNC: 8 MG/DL (ref 6–20)
BUN/CREAT SERPL: 15 (ref 12–20)
CALCIUM SERPL-MCNC: 9 MG/DL (ref 8.5–10.1)
CHLORIDE SERPL-SCNC: 91 MMOL/L (ref 97–108)
CO2 SERPL-SCNC: 31 MMOL/L (ref 21–32)
CREAT SERPL-MCNC: 0.52 MG/DL (ref 0.7–1.3)
DIFFERENTIAL METHOD BLD: ABNORMAL
EOSINOPHIL # BLD: 0 K/UL (ref 0–0.4)
EOSINOPHIL NFR BLD: 1 % (ref 0–7)
ERYTHROCYTE [DISTWIDTH] IN BLOOD BY AUTOMATED COUNT: 10.9 % (ref 11.5–14.5)
GLUCOSE SERPL-MCNC: 76 MG/DL (ref 65–100)
HCT VFR BLD AUTO: 35.7 % (ref 36.6–50.3)
HGB BLD-MCNC: 12.8 G/DL (ref 12.1–17)
LYMPHOCYTES # BLD: 1.1 K/UL
LYMPHOCYTES NFR BLD: 28 % (ref 12–49)
MCH RBC QN AUTO: 35.1 PG (ref 26–34)
MCHC RBC AUTO-ENTMCNC: 35.9 G/DL (ref 30–36.5)
MCV RBC AUTO: 97.8 FL (ref 80–99)
MONOCYTES # BLD: 0.4 K/UL (ref 0–1)
MONOCYTES NFR BLD: 11 % (ref 5–13)
NEUTS SEG # BLD: 2.4 K/UL (ref 1.8–8)
NEUTS SEG NFR BLD: 60 % (ref 32–75)
PLATELET # BLD AUTO: 195 K/UL (ref 150–400)
PMV BLD AUTO: 10.2 FL (ref 8.9–12.9)
POTASSIUM SERPL-SCNC: 4.1 MMOL/L (ref 3.5–5.1)
RBC # BLD AUTO: 3.65 M/UL (ref 4.1–5.7)
SODIUM SERPL-SCNC: 127 MMOL/L (ref 136–145)
WBC # BLD AUTO: 4 K/UL (ref 4.1–11.1)

## 2019-05-13 PROCEDURE — 85025 COMPLETE CBC W/AUTO DIFF WBC: CPT

## 2019-05-13 PROCEDURE — 36415 COLL VENOUS BLD VENIPUNCTURE: CPT

## 2019-05-13 PROCEDURE — 73660 X-RAY EXAM OF TOE(S): CPT

## 2019-05-13 PROCEDURE — 74011250636 HC RX REV CODE- 250/636: Performed by: EMERGENCY MEDICINE

## 2019-05-13 PROCEDURE — 99283 EMERGENCY DEPT VISIT LOW MDM: CPT

## 2019-05-13 PROCEDURE — 96365 THER/PROPH/DIAG IV INF INIT: CPT

## 2019-05-13 PROCEDURE — 87040 BLOOD CULTURE FOR BACTERIA: CPT

## 2019-05-13 PROCEDURE — 77030036687 HC SHOE PSTOP S2SG -A

## 2019-05-13 PROCEDURE — 80048 BASIC METABOLIC PNL TOTAL CA: CPT

## 2019-05-13 RX ORDER — CLINDAMYCIN PHOSPHATE 600 MG/50ML
600 INJECTION, SOLUTION INTRAVENOUS
Status: COMPLETED | OUTPATIENT
Start: 2019-05-13 | End: 2019-05-13

## 2019-05-13 RX ORDER — DOXYCYCLINE HYCLATE 100 MG
100 TABLET ORAL 2 TIMES DAILY
Qty: 14 TAB | Refills: 0 | Status: SHIPPED | OUTPATIENT
Start: 2019-05-13 | End: 2019-05-20

## 2019-05-13 RX ADMIN — CLINDAMYCIN PHOSPHATE 600 MG: 600 INJECTION, SOLUTION INTRAVENOUS at 12:44

## 2019-05-13 NOTE — DISCHARGE INSTRUCTIONS
Cellulitis: Care Instructions  Your Care Instructions    Cellulitis is a skin infection caused by bacteria, most often strep or staph. It often occurs after a break in the skin from a scrape, cut, bite, or puncture, or after a rash. Cellulitis may be treated without doing tests to find out what caused it. But your doctor may do tests, if needed, to look for a specific bacteria, like methicillin-resistant Staphylococcus aureus (MRSA). The doctor has checked you carefully, but problems can develop later. If you notice any problems or new symptoms, get medical treatment right away. Follow-up care is a key part of your treatment and safety. Be sure to make and go to all appointments, and call your doctor if you are having problems. It's also a good idea to know your test results and keep a list of the medicines you take. How can you care for yourself at home? · Take your antibiotics as directed. Do not stop taking them just because you feel better. You need to take the full course of antibiotics. · Prop up the infected area on pillows to reduce pain and swelling. Try to keep the area above the level of your heart as often as you can. · If your doctor told you how to care for your wound, follow your doctor's instructions. If you did not get instructions, follow this general advice:  ? Wash the wound with clean water 2 times a day. Don't use hydrogen peroxide or alcohol, which can slow healing. ? You may cover the wound with a thin layer of petroleum jelly, such as Vaseline, and a nonstick bandage. ? Apply more petroleum jelly and replace the bandage as needed. · Be safe with medicines. Take pain medicines exactly as directed. ? If the doctor gave you a prescription medicine for pain, take it as prescribed. ? If you are not taking a prescription pain medicine, ask your doctor if you can take an over-the-counter medicine.   To prevent cellulitis in the future  · Try to prevent cuts, scrapes, or other injuries to your skin. Cellulitis most often occurs where there is a break in the skin. · If you get a scrape, cut, mild burn, or bite, wash the wound with clean water as soon as you can to help avoid infection. Don't use hydrogen peroxide or alcohol, which can slow healing. · If you have swelling in your legs (edema), support stockings and good skin care may help prevent leg sores and cellulitis. · Take care of your feet, especially if you have diabetes or other conditions that increase the risk of infection. Wear shoes and socks. Do not go barefoot. If you have athlete's foot or other skin problems on your feet, talk to your doctor about how to treat them. When should you call for help? Call your doctor now or seek immediate medical care if:    · You have signs that your infection is getting worse, such as:  ? Increased pain, swelling, warmth, or redness. ? Red streaks leading from the area. ? Pus draining from the area. ? A fever.     · You get a rash.    Watch closely for changes in your health, and be sure to contact your doctor if:    · You do not get better as expected. Where can you learn more? Go to http://negrita-edie.info/. Thien Loco in the search box to learn more about \"Cellulitis: Care Instructions. \"  Current as of: April 17, 2018  Content Version: 11.9  © 2484-9969 [employer]. Care instructions adapted under license by [employer] (which disclaims liability or warranty for this information). If you have questions about a medical condition or this instruction, always ask your healthcare professional. [place] [address] any warranty or liability for your use of this information. Patient Education        Patient Education        Hyponatremia: Care Instructions  Your Care Instructions    Hyponatremia (say \"gv-[id number]-[id number]-william-uh\") means that you don't have enough sodium in your blood. It can cause nausea, vomiting, and headaches. Or you may not feel hungry. In serious cases, it can cause seizures, a coma, or even death. Hyponatremia is not a disease. It is a problem caused by something else, such as medicines or exercising for a long time in hot weather. You can get hyponatremia if you lose a lot of fluids and then you drink a lot of water or other liquids that don't have much sodium. You can also get it if you have kidney, liver, heart, or other health problems. Treatment is focused on getting your sodium levels back to normal.  Follow-up care is a key part of your treatment and safety. Be sure to make and go to all appointments, and call your doctor if you are having problems. It's also a good idea to know your test results and keep a list of the medicines you take. How can you care for yourself at home? · If your doctor recommends it, drink fluids that have sodium. Sports drinks are a good choice. Or you can eat salty foods. · If your doctor recommends it, limit the amount of water you drink. And limit fluids that are mostly water. These include tea, coffee, and juice. · Take your medicines exactly as prescribed. Call your doctor if you have any problems with your medicine. · Get your sodium levels tested when your doctor tells you to. When should you call for help? Call 911 anytime you think you may need emergency care. For example, call if:    · You have a seizure.     · You passed out (lost consciousness).    Call your doctor now or seek immediate medical care if:    · You are confused or it is hard to focus.     · You have little or no appetite.     · You feel sick to your stomach or you vomit.     · You have a headache.     · You have mood changes.     · You feel more tired than usual.    Watch closely for changes in your health, and be sure to contact your doctor if:    · You do not get better as expected. Where can you learn more? Go to http://negrita-edie.info/.   Enter A722 in the search box to learn more about \"Hyponatremia: Care Instructions. \"  Current as of: June 25, 2018  Content Version: 11.9  © 8727-4563 [employer]. Care instructions adapted under license by [employer] (which disclaims liability or warranty for this information). If you have questions about a medical condition or this instruction, always ask your healthcare professional. [address] any warranty or liability for your use of this information. Patient Education        Broken Toe: Care Instructions  Your Care Instructions  You have broken (fractured) a bone in your toe. This kind of fracture does not need a special cast or brace. \"Tio-taping\" your broken toe to a healthy toe next to it is almost always enough to treat the problem and ease symptoms. The toe may take 4 weeks or more to heal.  You heal best when you take good care of yourself. Eat a variety of healthy foods, and don't smoke. Follow-up care is a key part of your treatment and safety. Be sure to make and go to all appointments, and call your doctor if you are having problems. It's also a good idea to know your test results and keep a list of the medicines you take. How can you care for yourself at home? · Be safe with medicines. Take pain medicines exactly as directed. ? If the doctor gave you a prescription medicine for pain, take it as prescribed. ? If you are not taking a prescription pain medicine, ask your doctor if you can take an over-the-counter medicine. · If your toe is taped to the toe next to it, your doctor has shown you how to change the tape. Protect the skin by putting something soft, such as felt or foam, between your toes before you tape them together. Never tape the toes together skin-to-skin. Your broken toe may need to be tio-taped for 2 to 4 weeks to heal.  · Rest and protect your toe. Do not walk on it until you can do so without too much pain.  If the doctor has told you to use crutches, use them as instructed. · Put ice or a cold pack on your toe for 10 to 20 minutes at a time. Try to do this every 1 to 2 hours for the next 3 days (when you are awake) or until the swelling goes down. Put a thin cloth between the ice and your skin. · Prop up your foot on a pillow when you ice it or anytime you sit or lie down. Try to keep it above the level of your heart. This will help reduce swelling. · Make sure you go to your follow-up appointments. Your doctor will need to check that your toe is healing right. When should you call for help? Call your doctor now or seek immediate medical care if:    · You have severe pain.     · Your toe is cool or pale or changes color.     · You have tingling, weakness, or numbness in your toe.    Watch closely for changes in your health, and be sure to contact your doctor if:    · Pain and swelling get worse.     · You are not getting better as expected. Where can you learn more? Go to http://negrita-edie.info/. Enter Q161 in the search box to learn more about \"Broken Toe: Care Instructions. \"  Current as of: September 20, 2018  Content Version: 11.9  © 5208-8440 [employer]. Care instructions adapted under license by [employer] (which disclaims liability or warranty for this information). If you have questions about a medical condition or this instruction, always ask your healthcare professional. [place] [address] any warranty or liability for your use of this information.

## 2019-05-13 NOTE — ED PROVIDER NOTES
60 yo male with cp, developmental delay, DM presents with wound to right great toe. Per caregiver from group home, staff noticed it last night. There was no known injury but he could have hit it on the bed per staff. He does wear a brace on his right leg. His tetanus is utd. He has been acting normally this morning and ate well per staff. No vomiting. Hx ros, limited given pt nonverbal 
 
 
  
 
Past Medical History:  
Diagnosis Date  Cerebral palsy ([facility] CP  Developmental delay  Diabetes ([facility]  Hypothyroidism  Mental retardation Mild  Psoriasis  Psychiatric disorder  Seizures ([facility] History reviewed. No pertinent surgical history. Family History:  
Family history unknown: Yes  
 
 
Social History Socioeconomic History  Marital status: SINGLE Spouse name: Not on file  Number of children: Not on file  Years of education: Not on file  Highest education level: Not on file Occupational History  Not on file Social Needs  Financial resource strain: Not on file  Food insecurity:  
  Worry: Not on file Inability: Not on file  Transportation needs:  
  Medical: Not on file Non-medical: Not on file Tobacco Use  Smoking status: Never Smoker  Smokeless tobacco: Never Used Substance and Sexual Activity  Alcohol use: No  
 Drug use: Yes Types: Prescription  Sexual activity: Not on file Lifestyle  Physical activity:  
  Days per week: Not on file Minutes per session: Not on file  Stress: Not on file Relationships  Social connections:  
  Talks on phone: Not on file Gets together: Not on file Attends [employer] service: Not on file Active member of club or organization: Not on file Attends meetings of clubs or organizations: Not on file Relationship status: Not on file  Intimate partner violence:  
  Fear of current or ex partner: Not on file Emotionally abused: Not on file Physically abused: Not on file Forced sexual activity: Not on file Other Topics Concern  Not on file Social History Narrative  Not on file ALLERGIES: Patient has no known allergies. Review of Systems Unable to perform ROS: Patient nonverbal  
Constitutional: Negative for fever. Skin: Positive for wound. All other systems reviewed and are negative. Vitals:  
 05/13/19 1142 BP: 111/61 Pulse: (!) 48 Resp: 18 Temp: 97.9 °F (36.6 °C) SpO2: 97% Weight: 75.8 kg (167 lb) Height: 5' 7\" (1.702 m) Physical Exam  
Constitutional: He appears well-developed and well-nourished. HENT:  
Head: Normocephalic and atraumatic. Eyes: Conjunctivae are normal.  
Neck: Normal range of motion. Cardiovascular: Normal rate, regular rhythm, normal heart sounds and intact distal pulses. No murmur heard. Pulmonary/Chest: Effort normal and breath sounds normal. No stridor. No respiratory distress. He has no wheezes. He has no rales. Abdominal: Soft. Bowel sounds are normal. He exhibits no distension and no mass. There is no tenderness. There is no guarding. Musculoskeletal: He exhibits no edema or tenderness. Right great toe with erythema warmth and blister to dorsal aspect; + erythema taking up dorsum of the foot; palpable pulses Neurological: He is alert. Skin: Skin is warm and dry. Nursing note and vitals reviewed. MDM Number of Diagnoses or Management Options Cellulitis of right toe: Hyponatremia:  
Leukopenia, unspecified type:  
Nondisplaced fracture of proximal phalanx of right great toe, initial encounter for closed fracture:  
Diagnosis management comments: Appears to be cellulitis Given diabetic and nonverbal will check xray for gas or foreign body or fx- check wbc, start abx. Discussed with his caregiver who is at bedside Amount and/or Complexity of Data Reviewed Clinical lab tests: ordered and reviewed Tests in the radiology section of CPT®: ordered and reviewed Obtain history from someone other than the patient: yes (caregiver) Review and summarize past medical records: yes Patient Progress Patient progress: stable Procedures Spoke with pt and caregiver. I discussed results. Sodium always mildly low but last labs we have were from 2017 when it was 131. Also wbc is low which may be from enbrel but they will need to follow up with pcp. Sky taped and placed post op shoe. He can walk but will need assistance. Discussed signs of worsening infection, fever, streaking of redness etc as reasons to return as he is immunosupressed. His caregiver understands. 2:06 PM 
Patient's results have been reviewed with them. Patient and/or family have verbally conveyed their understanding and agreement of the patient's signs, symptoms, diagnosis, treatment and prognosis and additionally agree to follow up as recommended or return to the Emergency Room should their condition change prior to follow-up. Discharge instructions have also been provided to the patient with some educational information regarding their diagnosis as well a list of reasons why they would want to return to the ER prior to their follow-up appointment should their condition change.

## 2019-05-13 NOTE — ED NOTES
Pt returned from xray. IV antibiotics infusing without difficulty. Call bell and caregiver at bedside.

## 2019-05-13 NOTE — ED TRIAGE NOTES
Pt presents with Caregiver with c/o right great toe blister noticed last night by staff. Caregiver rpts pt normally wears a special shoe with a leg brace. Unknown injury.

## 2019-05-19 LAB
BACTERIA SPEC CULT: NORMAL
BACTERIA SPEC CULT: NORMAL
SERVICE CMNT-IMP: NORMAL
SERVICE CMNT-IMP: NORMAL

## 2019-05-26 ENCOUNTER — HOSPITAL ENCOUNTER (EMERGENCY)
Dept: CT IMAGING | Age: 59
Discharge: HOME | End: 2019-05-26
Attending: EMERGENCY MEDICINE
Payer: MEDICAID

## 2019-05-26 ENCOUNTER — APPOINTMENT (OUTPATIENT)
Dept: GENERAL RADIOLOGY | Age: 59
End: 2019-05-26
Attending: EMERGENCY MEDICINE
Payer: MEDICAID

## 2019-05-26 ENCOUNTER — HOSPITAL ENCOUNTER (EMERGENCY)
Age: 59
Discharge: HOME | End: 2019-05-26
Attending: EMERGENCY MEDICINE | Admitting: EMERGENCY MEDICINE
Payer: MEDICAID

## 2019-05-26 VITALS
OXYGEN SATURATION: 97 % | HEART RATE: 79 BPM | DIASTOLIC BLOOD PRESSURE: 59 MMHG | TEMPERATURE: 96.9 F | SYSTOLIC BLOOD PRESSURE: 102 MMHG | RESPIRATION RATE: 18 BRPM

## 2019-05-26 DIAGNOSIS — S40.211A ABRASION OF RIGHT SHOULDER AREA, INITIAL ENCOUNTER: ICD-10-CM

## 2019-05-26 DIAGNOSIS — S00.83XA CONTUSION OF FACE, INITIAL ENCOUNTER: Primary | ICD-10-CM

## 2019-05-26 PROCEDURE — 70486 CT MAXILLOFACIAL W/O DYE: CPT

## 2019-05-26 PROCEDURE — 73502 X-RAY EXAM HIP UNI 2-3 VIEWS: CPT

## 2019-05-26 PROCEDURE — 73030 X-RAY EXAM OF SHOULDER: CPT

## 2019-05-26 PROCEDURE — 70450 CT HEAD/BRAIN W/O DYE: CPT

## 2019-05-26 PROCEDURE — 99283 EMERGENCY DEPT VISIT LOW MDM: CPT

## 2019-05-26 RX ORDER — SULFAMETHOXAZOLE AND TRIMETHOPRIM 800; 160 MG/1; MG/1
1 TABLET ORAL 2 TIMES DAILY
COMMUNITY
End: 2022-06-22

## 2019-05-26 NOTE — DISCHARGE INSTRUCTIONS
Patient Education        Scrapes (Abrasions): Care Instructions  Your Care Instructions  Scrapes (abrasions) are wounds where your skin has been rubbed or torn off. Most scrapes do not go deep into the skin, but some may remove several layers of skin. Scrapes usually don't bleed much, but they may ooze pinkish fluid. Scrapes on the head or face may appear worse than they are. They may bleed a lot because of the good blood supply to this area. Most scrapes heal well and may not need a bandage. They usually heal within 3 to 7 days. A large, deep scrape may take 1 to 2 weeks or longer to heal. A scab may form on some scrapes. Follow-up care is a key part of your treatment and safety. Be sure to make and go to all appointments, and call your doctor if you are having problems. It's also a good idea to know your test results and keep a list of the medicines you take. How can you care for yourself at home? · If your doctor told you how to care for your wound, follow your doctor's instructions. If you did not get instructions, follow this general advice:  ? Wash the scrape with clean water 2 times a day. Don't use hydrogen peroxide or alcohol, which can slow healing. ? You may cover the scrape with a thin layer of petroleum jelly, such as Vaseline, and a nonstick bandage. ? Apply more petroleum jelly and replace the bandage as needed. · Prop up the injured area on a pillow anytime you sit or lie down during the next 3 days. Try to keep it above the level of your heart. This will help reduce swelling. · Be safe with medicines. Take pain medicines exactly as directed. ? If the doctor gave you a prescription medicine for pain, take it as prescribed. ? If you are not taking a prescription pain medicine, ask your doctor if you can take an over-the-counter medicine. When should you call for help?   Call your doctor now or seek immediate medical care if:    · You have signs of infection, such as:  ? Increased pain, swelling, warmth, or redness around the scrape. ? Red streaks leading from the scrape. ? Pus draining from the scrape. ? A fever.     · The scrape starts to bleed, and blood soaks through the bandage. Oozing small amounts of blood is normal.    Watch closely for changes in your health, and be sure to contact your doctor if the scrape is not getting better each day. Where can you learn more? Go to http://negrita-edie.info/. Enter A374 in the search box to learn more about \"Scrapes (Abrasions): Care Instructions. \"  Current as of: September 23, 2018  Content Version: 11.9  © 5937-6336 [employer]. Care instructions adapted under license by [employer] (which disclaims liability or warranty for this information). If you have questions about a medical condition or this instruction, always ask your healthcare professional. [address] any warranty or liability for your use of this information.

## 2019-05-26 NOTE — ED TRIAGE NOTES
Pt assisted to treatment area via wheelchair his caregiver states that this morning about 0830 the pt fell when he was getting up from the chair he tripped on his own foot falling to the right side. The caregiver arrived at lunch time to feed him and his mouth was swollen and he seemed to be having a difficult time walking like he had pain to the right hip as well as his right shoulder.  Pt did not have anything for pain PTA

## 2019-05-26 NOTE — ED PROVIDER NOTES
Pt. Presents to the ER with a caregiver after suffering a GLF earlier today. Pt. Was getting up from the table this morning at breakfast.  Pt. Tripped on his feet and fell onto his right side. HIs caregivers noticed some swelling to his right upper lip. He has been walking, but he seems to be limping more than normal and has been favoring his right side. No LOC. Pt. Is not able to communicate if he is having pain. Pt. Is at his mental status baseline. Past Medical History:   Diagnosis Date    [facility]bral pals[facility] ([facility]     CP    Developmental delay     [facility] [place]     Hypothyroidism     Mental retardation     Mild    Psoriasis     Psychiatric disorder     Seizures ([facility] [place]        History reviewed. No pertinent surgical history.       Family History:   Family history unknown: Yes       Social History     Socioeconomic History    Marital status: SINGLE     Spouse name: Not on file    Number of children: Not on file    Years of education: Not on file    Highest education level: Not on file   Occupational History    Not on file   Social Needs    Financial resource strain: Not on file    Food insecurity:     Worry: Not on file     Inability: Not on file    Transportation needs:     Medical: Not on file     Non-medical: Not on file   Tobacco Use    Smoking status: Never Smoker    Smokeless tobacco: Never Used   Substance and Sexual Activity    Alcohol use: No    Drug use: Yes     Types: Prescription    Sexual activity: Not on file   Lifestyle    Physical activity:     Days per week: Not on file     Minutes per session: Not on file    Stress: Not on file   Relationships    Social connections:     Talks on phone: Not on file     Gets together: Not on file     Attends Baptism service: Not on file     Active member of club or organization: Not on file     Attends meetings of clubs or organizations: Not on file     Relationship status: Not on file    Intimate partner violence:     Fear of current or ex partner: Not on file     Emotionally abused: Not on file     Physically abused: Not on file     Forced sexual activity: Not on file   Other Topics Concern    Not on file   Social History Narrative    Not on file         ALLERGIES: Patient has no known allergies. Review of Systems   Constitutional: Negative for chills and fever. HENT: Negative for rhinorrhea and sore throat. Respiratory: Negative for cough and shortness of breath. Cardiovascular: Negative for chest pain. Gastrointestinal: Negative for abdominal pain, diarrhea, nausea and vomiting. Genitourinary: Negative for dysuria and hematuria. Musculoskeletal: Negative for arthralgias and myalgias. Skin: Positive for wound. Negative for pallor and rash. Neurological: Negative for dizziness, weakness and light-headedness. All other systems reviewed and are negative. Vitals:    05/26/19 1322   BP: 102/59   Pulse: 79   Resp: 18   Temp: 96.9 °F (36.1 °C)   SpO2: 97%            Physical Exam     Vital signs reviewed. Nursing notes reviewed.     Const:  No acute distress, well developed, well nourished  Head:  Abrasion and swelling to right upper lip  Eyes:  PERRL, conjunctiva normal, no scleral icterus  Neck:  Supple, trachea midline  Cardiovascular:  RRR, no murmurs, no gallops, no rubs  Resp:  No resp distress, no increased work of breathing, no wheezes, no rhonchi, no rales,  Abd:  Soft, non-tender, non-distended, no rebound, no guarding, no CVA tenderness  MSK:  No pedal edema, normal ROM, no apparent pain with ROM at the right or left hip and lower extremities, no apparent tenderness to palpation of the right elbow, wrist and hand  Neuro:  Alert and awake, no cranial nerve defect  Skin:  Abrasion to right shoulder, swelling and abrasion to right upper lip  Psych: normal mood and affect, behavior is normal, judgement and thought content is normal          MDM  Number of Diagnoses or Management Options  Abrasion of right shoulder area, initial encounter:   Contusion of face, initial encounter:      Amount and/or Complexity of Data Reviewed  Tests in the radiology section of CPT®: ordered and reviewed  Obtain history from someone other than the patient: yes  Review and summarize past medical records: yes    Patient Progress  Patient progress: stable          Pt. Presents to the ER after a fall. No fx on xray or CT. Pt is at his mental status baseline. Pt. To f/u with his PCP or return to the ER with worsening sx.       Procedures

## 2019-06-21 ENCOUNTER — OFFICE VISIT (OUTPATIENT)
Dept: NEUROLOGY | Age: 59
End: 2019-06-21

## 2019-06-21 VITALS
WEIGHT: 148 LBS | DIASTOLIC BLOOD PRESSURE: 50 MMHG | RESPIRATION RATE: 18 BRPM | HEIGHT: 67 IN | OXYGEN SATURATION: 95 % | BODY MASS INDEX: 23.23 KG/M2 | HEART RATE: 64 BPM | SYSTOLIC BLOOD PRESSURE: 98 MMHG

## 2019-06-21 DIAGNOSIS — G40.209 COMPLEX PARTIAL SEIZURES EVOLVING TO GENERALIZED TONIC-CLONIC SEIZURES (HCC): ICD-10-CM

## 2019-06-21 DIAGNOSIS — F79 INTELLECTUAL DISABILITY: Primary | ICD-10-CM

## 2019-06-21 NOTE — PROGRESS NOTES
History of seizures neurology Consult      Subjective:      Maida Schroeder is a 59 y.o. male Who comes in today with the attendant. Apparently lives in a an adult home health [address] [facility]. I have seen this gentleman for quite a while and he has intellectual disability with cerebral palsy expressed as a right hemiparetic feature and supportive right leg wear and he sees orthopedics. Has seizures that are to provide his complex partial with secondary generalization. Is on Depakote extended release 500 mg 3 times daily and Tegretol extended release 200 mg twice daily. Has had no seizures as far as the attendance are concerned at the adult residence. No new medical surgical history and will check updated labs today. Exam looks strictly baseline and suggest revisit in 1 year. Also sees psychiatry. Current Outpatient Medications   Medication Sig Dispense Refill    risperiDONE (RISPERDAL) 4 mg tablet Take 4 mg by mouth daily (after dinner).  DEPAKOTE  mg ER tablet TAKE THREE (3) TABLETS DAILY *BRAND MEDICALLY NECESSARY* 90 Tab 11    carBAMazepine XR (TEGRETOL XR) 200 mg SR tablet TAKE ONE TABLET TWICE A DAY *BRAND PREF* 60 Tab PRN    OTHER True Track smart  System  Twice weekly ( check blood glucose)      tamsulosin (FLOMAX) 0.4 mg capsule Take 0.4 mg by mouth daily.  cetaphil (CETAPHIL) topical cream Apply  to affected area as needed for Dry Skin.  hydrocortisone valerate (WESTCORT) 0.2 % topical cream Apply  to affected area two (2) times a day. use thin layer      clobetasol (CLOBEX) 0.05 % lotion Apply  to affected area two (2) times a day.  sennosides (SENNA) 8.6 mg cap Take 2 Tabs by mouth daily.  cholecalciferol (VITAMIN D3) 400 unit tab tablet Take  by mouth daily.  loratadine (CLARITIN) 10 mg tablet Take 10 mg by mouth daily.  Fesoterodine (TOVIAZ) 4 mg SR tablet Take 8 mg by mouth daily.       polyethylene glycol (MIRALAX) 17 gram packet Take 17 g by mouth daily.      ferrous sulfate (IRON) 325 mg (65 mg iron) tablet Take  by mouth Daily (before breakfast).  risperiDONE (RISPERDAL) 2 mg tablet Take 2 mg by mouth daily.  simvastatin (ZOCOR) 40 mg tablet Take 20 mg by mouth nightly.  levothyroxine (SYNTHROID) 75 mcg tablet Take  by mouth Daily (before breakfast).  lisinopril (PRINIVIL, ZESTRIL) 5 mg tablet Take  by mouth daily.  therapeutic multivitamin (THERAGRAN) tablet Take 1 Tab by mouth daily.  tiZANidine (ZANAFLEX) 2 mg capsule Take 2 mg by mouth three (3) times daily.  fluvoxaMINE (LUVOX) 50 mg tablet Take  by mouth two (2) times a day.  etanercept (ENBREL) 50 mg/mL (0.98 mL) injection by SubCUTAneous route.  trimethoprim-sulfamethoxazole (BACTRIM DS) 160-800 mg per tablet Take 1 Tab by mouth two (2) times a day.  mupirocin (BACTROBAN) 2 % ointment Apply  to affected area three (3) times daily. Apply to area for 10 days (Patient not taking: Reported on 6/21/2019) 22 g 0      No Known Allergies  Past Medical History:   Diagnosis Date    Cerebral palsy ([facility] [address]     CP    Developmental delay     Diabetes ([facility] [address] [facility]     Hypothyroidism     Mental retardation     Mild    Psoriasis     Psychiatric disorder     Seizures ([facility] [address] [facility]       No past surgical history on file.    Social History     Socioeconomic History    Marital status: SINGLE     Spouse name: Not on file    Number of children: Not on file    Years of education: Not on file    Highest education level: Not on file   Occupational History    Not on file   Social Needs    Financial resource strain: Not on file    Food insecurity:     Worry: Not on file     Inability: Not on file    Transportation needs:     Medical: Not on file     Non-medical: Not on file   Tobacco Use    Smoking status: Never Smoker    Smokeless tobacco: Never Used   Substance and Sexual Activity    Alcohol use: No    Drug use: Yes     Types: Prescription    Sexual activity: Not on file   Lifestyle    Physical activity:     Days per week: Not on file     Minutes per session: Not on file    Stress: Not on file   Relationships    Social connections:     Talks on phone: Not on file     Gets together: Not on file     Attends Pentecostalism service: Not on file     Active member of club or organization: Not on file     Attends meetings of clubs or organizations: Not on file     Relationship status: Not on file    Intimate partner violence:     Fear of current or ex partner: Not on file     Emotionally abused: Not on file     Physically abused: Not on file     Forced sexual activity: Not on file   Other Topics Concern    Not on file   Social History Narrative    Not on file      Family History   Family history unknown: Yes      Visit Vitals  BP 98/50 (BP 1 Location: Left arm, BP Patient Position: Sitting)   Pulse 64   Resp 18   Ht 5' 7\" (1.702 m)   Wt 67.1 kg (148 lb)   SpO2 95%   BMI 23.18 kg/m²        Review of Systems:   A comprehensive review of systems was negative except for that written in the HPI. Neuro Exam:     Appearance: The patient is well developed, well nourished, and unable to provide a coherent history and is in no acute distress. Mental Status: Oriented to name by inference. Mood and affect appropriate to baseline which shows easy distractibility occasional eye contact and somewhat garbled speech. Occasional responses to questions but sometimes not. .   Cranial Nerves:   Intact visual fields? Fundi are benign. JONAH, EOM's full, no nystagmus, no ptosis. Facial sensation is normal. Corneal reflexes are intact. Facial movement is symmetric. Hearing is normal bilaterally. Palate is midline with normal sternocleidomastoid and trapezius muscles are normal. Tongue is midline. Motor:  5/5 strength in upper and lower proximal and distal muscles on the left and in the right upper extremity is 4+ to 5- and right lower extremity 4-4+.  Normal bulk and tone on the left and a right cherri-atrophy type of picture baseline. No fasciculations. Reflexes:   Deep tendon reflexes 2+/4 and symmetrical.   Sensory:   Normal to touch, pinprick and vibration. Gait:   Patient came in with his right spastic hemiparetic gait and right leg supportive hardware. Tremor:   No tremor noted. Cerebellar:  No cerebellar signs present. Neurovascular:  Normal heart sounds and regular rhythm, peripheral pulses intact, and no carotid bruits. Assessment:   History of seizures typified is complex partial evolving to generalized seizures by type. Continue the Depakote ER and Tegretol extended release. Check updated labs. Revisit 1 year. Plan:   Revisit 1 year.   Signed by :  Helio Herron MD

## 2019-06-21 NOTE — PATIENT INSTRUCTIONS
Patient history reviewed and patient examined. Appears to be baseline and will check updated labs on this annual revisit. Will not manipulate the medicine.

## 2019-10-04 ENCOUNTER — TELEPHONE (OUTPATIENT)
Dept: NEUROLOGY | Age: 59
End: 2019-10-04

## 2019-10-04 DIAGNOSIS — G40.209 PARTIAL EPILEPSY WITH IMPAIRMENT OF CONSCIOUSNESS (HCC): ICD-10-CM

## 2019-10-04 NOTE — TELEPHONE ENCOUNTER
----- Message from Radha Cruz sent at 10/4/2019 10:28 AM EDT -----  Regarding: Dr Klein/telephone  General Message/Vendor Calls    Caller's first and last name:   Yash Serum from [address]    Reason for call: would like to confirm if any paper work was received from [facility] that was faxed on this past Tuesday 101/19 regarding an Prior Auth they will need for his [facility] ER       Callback required yes/no and why:yes for reason given above      Best contact number(s):[phone]      Details to clarify the request: they are trying to set it up so it will be ready for the rx in November      Radha Cruz

## 2019-10-08 RX ORDER — DIVALPROEX SODIUM 500 MG/1
TABLET, EXTENDED RELEASE ORAL
Qty: 90 TAB | Refills: 11 | Status: SHIPPED | OUTPATIENT
Start: 2019-10-08 | End: 2020-11-02

## 2019-10-08 NOTE — TELEPHONE ENCOUNTER
Called insurance and they stated the patient already has an Auth for Name Brand Depakote 500mg ER that expires 11/12/19. They are unable to submit a new PA since there is one already in the system. However, we can submit a new one within 30 days of the expiration date. Will submit PA request no later than 10/22/19.

## 2019-10-23 ENCOUNTER — TELEPHONE (OUTPATIENT)
Dept: NEUROLOGY | Age: 59
End: 2019-10-23

## 2019-10-23 NOTE — TELEPHONE ENCOUNTER
Attempted to submit Prior Auth for Depakote 500mg ER via Cover My meds to [facility] CMM system returned with message stating, \"The PA system cannot find any matching drug for the NDC/drug sent. PA cannot be created. \" Jaswant Chacong prior Auth to see if I could submit the PA over the phone. Spoke with rep Flower with [employer] [facility]. She stated that the Depakote name brand 500mg ER tablet does NOT REQUIRE prior authorization. She stated the next refill would be due on 10/29/2019. No PA required for Depakote.

## 2019-11-18 DIAGNOSIS — G40.209 PARTIAL EPILEPSY WITH IMPAIRMENT OF CONSCIOUSNESS (HCC): ICD-10-CM

## 2019-11-18 RX ORDER — CARBAMAZEPINE 200 MG/1
TABLET, EXTENDED RELEASE ORAL
Qty: 60 TAB | Status: SHIPPED | OUTPATIENT
Start: 2019-11-18 | End: 2020-11-19

## 2020-03-27 ENCOUNTER — TELEPHONE (OUTPATIENT)
Dept: NEUROLOGY | Age: 60
End: 2020-03-27

## 2020-03-27 NOTE — TELEPHONE ENCOUNTER
----- Message from Hans Phelan sent at 3/27/2020  2:20 PM EDT -----  Regarding: dr london/ refill  General Message/Vendor Calls    Caller's first and last name: Hannah Man from [facility]       Reason for call: called again to check on the status of the prior authorization needed for the pt's \"tegretol      Callback required yes/no and why: yes      Best contact number(s): [phone] ext [phone]      Details to clarify the request:      Amna Sahu

## 2020-11-19 DIAGNOSIS — G40.209 PARTIAL EPILEPSY WITH IMPAIRMENT OF CONSCIOUSNESS (HCC): ICD-10-CM

## 2020-11-19 RX ORDER — CARBAMAZEPINE 200 MG/1
TABLET, EXTENDED RELEASE ORAL
Qty: 60 TAB | Refills: 12 | Status: SHIPPED | OUTPATIENT
Start: 2020-11-19 | End: 2021-11-15

## 2021-03-15 DIAGNOSIS — G40.209 PARTIAL EPILEPSY WITH IMPAIRMENT OF CONSCIOUSNESS (HCC): ICD-10-CM

## 2021-03-19 RX ORDER — DIVALPROEX SODIUM 500 MG/1
TABLET, EXTENDED RELEASE ORAL
Qty: 45 TAB | Refills: 0 | Status: SHIPPED | OUTPATIENT
Start: 2021-03-19 | End: 2021-04-19

## 2021-04-23 ENCOUNTER — OFFICE VISIT (OUTPATIENT)
Dept: NEUROLOGY | Age: 61
End: 2021-04-23
Payer: MEDICAID

## 2021-04-23 VITALS
RESPIRATION RATE: 16 BRPM | TEMPERATURE: 97.3 F | DIASTOLIC BLOOD PRESSURE: 72 MMHG | BODY MASS INDEX: 23.18 KG/M2 | HEART RATE: 81 BPM | HEIGHT: 67 IN | SYSTOLIC BLOOD PRESSURE: 124 MMHG | OXYGEN SATURATION: 96 %

## 2021-04-23 DIAGNOSIS — F79 MENTAL IMPAIRMENT: Primary | ICD-10-CM

## 2021-04-23 DIAGNOSIS — G40.209 PARTIAL EPILEPSY WITH IMPAIRMENT OF CONSCIOUSNESS (HCC): ICD-10-CM

## 2021-04-23 PROCEDURE — 99213 OFFICE O/P EST LOW 20 MIN: CPT | Performed by: SPECIALIST

## 2021-04-23 NOTE — PATIENT INSTRUCTIONS
Patient history reviewed patient examined. No seizures in recent years and will suggest continuation of medication as is and updated labs. Revisit 1 year.

## 2021-04-23 NOTE — PROGRESS NOTES
Neurology Consult      Subjective:      Renuka Gonzalez is a 56 y.o. male who comes in today with his attendant from an adult care center in [place]. No seizures in recent years. He has background intellectual deficiency and partial epilepsy with impairment of consciousness. Is on Depakote extended release 500 mg 3 times daily. We will check labs today that include Depakote level CBC with differential and CMP. The attendant does not describe any recent or remote medical or surgical issues. I found him to be basically at his proverbial baseline. Revisit in 1 year. Current Outpatient Medications   Medication Sig Dispense Refill    Depakote  mg ER tablet TAKE THREE (3) TABLETS DAILY *BRAND MEDICALLY NECESSARY* 90 Tab 0    TEGretol  mg SR tablet TAKE ONE TABLET TWICE A DAY *BRAND PREF* 60 Tab 12    risperiDONE (RISPERDAL) 4 mg tablet Take 4 mg by mouth daily (after dinner).  OTHER True Track smart  System  Twice weekly ( check blood glucose)      tamsulosin (FLOMAX) 0.4 mg capsule Take 0.4 mg by mouth daily.  cetaphil (CETAPHIL) topical cream Apply  to affected area as needed for Dry Skin.  hydrocortisone valerate (WESTCORT) 0.2 % topical cream Apply  to affected area two (2) times a day. use thin layer      clobetasol (CLOBEX) 0.05 % lotion Apply  to affected area two (2) times a day.  sennosides (SENNA) 8.6 mg cap Take 2 Tabs by mouth daily.  cholecalciferol (VITAMIN D3) 400 unit tab tablet Take  by mouth daily.  loratadine (CLARITIN) 10 mg tablet Take 10 mg by mouth daily.  Fesoterodine (TOVIAZ) 4 mg SR tablet Take 8 mg by mouth daily.  polyethylene glycol (MIRALAX) 17 gram packet Take 17 g by mouth daily.  ferrous sulfate (IRON) 325 mg (65 mg iron) tablet Take  by mouth Daily (before breakfast).  risperiDONE (RISPERDAL) 2 mg tablet Take 2 mg by mouth daily.  simvastatin (ZOCOR) 40 mg tablet Take 20 mg by mouth nightly.       levothyroxine (SYNTHROID) 75 mcg tablet Take  by mouth Daily (before breakfast).  lisinopril (PRINIVIL, ZESTRIL) 5 mg tablet Take  by mouth daily.  therapeutic multivitamin (THERAGRAN) tablet Take 1 Tab by mouth daily.  tiZANidine (ZANAFLEX) 2 mg capsule Take 2 mg by mouth three (3) times daily.  fluvoxaMINE (LUVOX) 50 mg tablet Take  by mouth two (2) times a day.  etanercept (ENBREL) 50 mg/mL (0.98 mL) injection by SubCUTAneous route.  trimethoprim-sulfamethoxazole (BACTRIM DS) 160-800 mg per tablet Take 1 Tab by mouth two (2) times a day.  mupirocin (BACTROBAN) 2 % ointment Apply  to affected area three (3) times daily. Apply to area for 10 days (Patient not taking: Reported on 6/21/2019) 22 g 0      No Known Allergies  Past Medical History:   Diagnosis Date    Cerebral palsy ([facility]     CP    Developmental delay     Diabetes ([facility]     Hypothyroidism     Mental retardation     Mild    Psoriasis     Psychiatric disorder     Seizures ([facility]       No past surgical history on file.    Social History     Socioeconomic History    Marital status: SINGLE     Spouse name: Not on file    Number of children: Not on file    Years of education: Not on file    Highest education level: Not on file   Occupational History    Not on file   Social Needs    Financial resource strain: Not on file    Food insecurity     Worry: Not on file     Inability: Not on file    Transportation needs     Medical: Not on file     Non-medical: Not on file   Tobacco Use    Smoking status: Never Smoker    Smokeless tobacco: Never Used   Substance and Sexual Activity    Alcohol use: No    Drug use: Yes     Types: Prescription    Sexual activity: Not on file   Lifestyle    Physical activity     Days per week: Not on file     Minutes per session: Not on file    Stress: Not on file   Relationships    Social connections     Talks on phone: Not on file     Gets together: Not on file Attends Jehovah's witness service: Not on file     Active member of club or organization: Not on file     Attends meetings of clubs or organizations: Not on file     Relationship status: Not on file    Intimate partner violence     Fear of current or ex partner: Not on file     Emotionally abused: Not on file     Physically abused: Not on file     Forced sexual activity: Not on file   Other Topics Concern    Not on file   Social History Narrative    Not on file      Family History   Family history unknown: Yes      Visit Vitals  /72   Pulse 81   Temp 97.3 °F (36.3 °C) (Temporal)   Resp 16   Ht 5' 7\" (1.702 m)   SpO2 96%   BMI 23.18 kg/m²        Review of Systems:   A comprehensive review of systems was negative except for that written in the HPI. Neuro Exam:     Appearance: The patient is well developed, well nourished, and unable to provide a coherent history and is in no acute distress. Mental Status: Oriented to name by inference. Mood and affect appropriate to baseline which means he is basically cordial with fair eye contact and limited verbalization with conversation. Cranial Nerves:   Intact visual fields? Fundi are benign. JONAH, EOM's full, no nystagmus, no ptosis. Facial sensation is normal. Corneal reflexes are intact. Facial movement is symmetric. Hearing is normal bilaterally. Palate is midline with normal sternocleidomastoid and trapezius muscles are normal. Tongue is midline. Motor:  5/5 strength in upper and lower proximal and distal muscles. Normal bulk and tone on the left and right hemibody atrophy and breakthrough weakness 5-/5. No fasciculations. Reflexes:   Deep tendon reflexes 1-2+/4 and symmetrical.   Sensory:   Normal to touch, pinprick and vibration? Gait:   Came in a wheelchair    Tremor:   No tremor noted. Cerebellar:  No cerebellar signs present. Neurovascular:  Normal heart sounds and regular rhythm, peripheral pulses intact, and no carotid bruits. Assessment:   Intellectual deficiency and seizures felt to be partial epilepsy with impairment of consciousness. Continue with Depakote as is and will get updated labs as a goes to a Depakote level CBC with differential and CMP. Get good sleep minimize stress and stay compliant with medicine. Revisit 1 year. Plan:   Revisit 1 year.   Signed by :  Liliam Wiley MD

## 2021-04-23 NOTE — LETTER
4/25/2021 Patient: Claudia Jackson YOB: 1960 Date of Visit: 4/23/2021 MD [address] [facility] [address] Via Fax: [phone] Dear Alonzo Barber MD, Thank you for referring Mr. Claudia Jackson to [facility] for evaluation. My notes for this consultation are attached. If you have questions, please do not hesitate to call me. I look forward to following your patient along with you.  
 
 
Sincerely, 
 
Lev Arora MD

## 2021-05-12 DIAGNOSIS — G40.209 PARTIAL EPILEPSY WITH IMPAIRMENT OF CONSCIOUSNESS (HCC): ICD-10-CM

## 2021-05-12 RX ORDER — DIVALPROEX SODIUM 500 MG/1
TABLET, EXTENDED RELEASE ORAL
Qty: 93 TAB | Refills: 12 | Status: SHIPPED | OUTPATIENT
Start: 2021-05-12 | End: 2022-05-12

## 2021-09-03 ENCOUNTER — HOSPITAL ENCOUNTER (EMERGENCY)
Age: 61
Discharge: HOME | End: 2021-09-03
Attending: EMERGENCY MEDICINE
Payer: MEDICAID

## 2021-09-03 ENCOUNTER — APPOINTMENT (OUTPATIENT)
Dept: CT IMAGING | Age: 61
End: 2021-09-03
Attending: EMERGENCY MEDICINE
Payer: MEDICAID

## 2021-09-03 VITALS
RESPIRATION RATE: 16 BRPM | OXYGEN SATURATION: 95 % | SYSTOLIC BLOOD PRESSURE: 128 MMHG | DIASTOLIC BLOOD PRESSURE: 74 MMHG | HEART RATE: 63 BPM | TEMPERATURE: 96.1 F

## 2021-09-03 DIAGNOSIS — S01.511A LIP LACERATION, INITIAL ENCOUNTER: ICD-10-CM

## 2021-09-03 DIAGNOSIS — S80.212A ABRASION OF LEFT KNEE, INITIAL ENCOUNTER: ICD-10-CM
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
## 2021-09-03 PROCEDURE — 70450 CT HEAD/BRAIN W/O DYE: CPT

## 2021-09-03 PROCEDURE — 99282 EMERGENCY DEPT VISIT SF MDM: CPT

## 2021-09-03 NOTE — ED TRIAGE NOTES
Got out of bed this morning and fell hit head and knee on floor. No know  LOC. Unsure if he fell on tile. Staff member present at bedside with patient and has talked to other staff that was on shift last night and they believe the patient walked into a wall. LAC noted to the left side of lip. Bleeding controlled. Abrasion noted to the left knee, bleeding controlled.

## 2021-09-03 NOTE — ED PROVIDER NOTES
Date of Service:  9/3/2021    Patient:  Hina Patel    Chief Complaint:  Fall       HPI:  Hina Patel is a 59 y.o.  male who presents for evaluation of possible fall. Patient has cerebral palsy, mental retardation and virtually is nonverbal.  Patient arrives here from group home. Yesterday he was in bed and then was found on the toilet with a scrape on his knee. Staff notes that if he was to a fall onto the ground he would absolutely had needed help up off the floor. He was found on the toilet. There is a has a small laceration to the left upper lip that is already scabbed over. Patient's been acting at his baseline. They wanted him evaluation for possible fall. Past Medical History:   Diagnosis Date    [facility]erebral [facility] ([facility]     CP    Developmental delay     Diabetes ([facility]     Hypothyroidism     Mental retardation     Mild    Psoriasis     Psychiatric disorder     Seizures ([facility]        History reviewed. No pertinent surgical history. Family History:   Family history unknown: Yes       Social History     Socioeconomic History    Marital status: SINGLE     Spouse name: Not on file    Number of children: Not on file    Years of education: Not on file    Highest education level: Not on file   Occupational History    Not on file   Tobacco Use    Smoking status: Never Smoker    Smokeless tobacco: Never Used   Substance and Sexual Activity    Alcohol use: No    Drug use: Yes     Types: Prescription    Sexual activity: Not on file   Other Topics Concern    Not on file   Social History Narrative    Not on file     Social Determinants of Health     Financial Resource Strain:     Difficulty of Paying Living Expenses:    Food Insecurity:     Worried About Running Out of Food in the Last Year:     [address] in the Last Year:    Transportation Needs:     Lack of Transportation (Medical):      Lack of Transportation (Non-Medical):    Physical Activity:     Days of Exercise per Week:     Minutes of Exercise per Session:    Stress:     Feeling of Stress :    Social Connections:     Frequency of Communication with Friends and Family:     Frequency of Social Gatherings with Friends and Family:     Attends Restorationism Services:     Active Member of Clubs or Organizations:     Attends Club or Organization Meetings:     Marital Status:    Intimate Partner Violence:     Fear of Current or Ex-Partner:     Emotionally Abused:     Physically Abused:     Sexually Abused: ALLERGIES: Patient has no known allergies. Review of Systems   Unable to perform ROS: Patient nonverbal       Vitals:    09/03/21 1230   BP: 128/74   Pulse: 63   Resp: 16   Temp: (!) 96.1 °F (35.6 °C)   SpO2: 95%            Physical Exam  Vitals and nursing note reviewed. Constitutional:       Appearance: Normal appearance. HENT:      Head: Normocephalic. Nose: Nose normal.      Mouth/Throat:      Mouth: Mucous membranes are moist.   Eyes:      General: No scleral icterus. Cardiovascular:      Rate and Rhythm: Normal rate. Pulses: Normal pulses. Pulmonary:      Effort: Pulmonary effort is normal.   Abdominal:      General: Abdomen is flat. Musculoskeletal:         General: Normal range of motion. Skin:     General: Skin is warm. Capillary Refill: Capillary refill takes less than 2 seconds. Comments: Less than 1cm stellate lac to left upper lip that is scabbing over. Abrasion to left knee   Neurological:      Mental Status: He is alert. Mental status is at baseline. Psychiatric:         Mood and Affect: Mood normal.          MDM     VITAL SIGNS:  No data found. LABS:  No results found for this or any previous visit (from the past 6 hour(s)). IMAGING:  CT HEAD WO CONT   Final Result   No interval change. Medications During Visit:  Medications - No data to display      DECISION MAKING:  Eber Leach is a 59 y.o. male who comes in as above. Here, patient appears well. Small abrasion to the left knee and small laceration is already healing/scabbed to the left upper lip that does not involve any portion of the vermilion border. Questionable whether the patient fell or just struck his knee and face on a stationary object as the nursing home staff states that he would have to have some type of help to get up and that he was found on the toilet. This time patient will be discharged home as he is acting at his baseline. IMPRESSION:  1. Fall, initial encounter    2. Abrasion of left knee, initial encounter    3. Lip laceration, initial encounter        DISPOSITION:  Discharged      Discharge Medication List as of 9/3/2021  1:49 PM           Follow-up Information     Follow up With Specialties Details Why Contact Info    Rachel Roth MD Family Medicine Schedule an appointment as soon as possible for a visit   [address] [place] [address] [phone]              The patient is asked to follow-up with their primary care provider in the next several days. They are to call tomorrow for an appointment. The patient is asked to return promptly for any increased concerns or worsening of symptoms. They can return to this emergency department or any other emergency department.     Procedures

## 2021-09-03 NOTE — ED NOTES
Pt was discharged and given instructions by Dr Ele Fang. Pt caregiver verbalized good understanding of all discharge instructions, and F/U care. All questions answered. Pt in stable condition on discharge.

## 2021-11-15 DIAGNOSIS — G40.209 PARTIAL EPILEPSY WITH IMPAIRMENT OF CONSCIOUSNESS (HCC): ICD-10-CM

## 2021-11-15 RX ORDER — CARBAMAZEPINE 200 MG/1
TABLET, EXTENDED RELEASE ORAL
Qty: 60 TABLET | Refills: 11 | Status: SHIPPED | OUTPATIENT
Start: 2021-11-15 | End: 2022-10-12

## 2022-03-18 PROBLEM — A41.9 SEPSIS (HCC): Status: ACTIVE | Noted: 2017-06-17

## 2022-03-19 PROBLEM — N28.9 RENAL INSUFFICIENCY: Status: ACTIVE | Noted: 2017-06-17

## 2022-03-19 PROBLEM — E87.1 HYPONATREMIA: Status: ACTIVE | Noted: 2017-06-17

## 2022-03-19 PROBLEM — D72.829 LEUKOCYTOSIS: Status: ACTIVE | Noted: 2017-06-17

## 2022-06-22 ENCOUNTER — OFFICE VISIT (OUTPATIENT)
Dept: NEUROLOGY | Age: 62
End: 2022-06-22
Payer: MEDICAID

## 2022-06-22 VITALS
HEART RATE: 50 BPM | OXYGEN SATURATION: 98 % | RESPIRATION RATE: 13 BRPM | DIASTOLIC BLOOD PRESSURE: 76 MMHG | SYSTOLIC BLOOD PRESSURE: 126 MMHG

## 2022-06-22 DIAGNOSIS — R56.9 SEIZURES (HCC): Primary | ICD-10-CM

## 2022-06-22 PROCEDURE — 99213 OFFICE O/P EST LOW 20 MIN: CPT | Performed by: SPECIALIST

## 2022-06-22 NOTE — PATIENT INSTRUCTIONS
Patient history viewed patient examined. Seizure-free and will continue the same Depakote extended release. We will check an updated Depakote level. Get good sleep minimize stress and stay compliant with medicine. Revisit in 1 year.

## 2022-06-22 NOTE — LETTER
6/22/2022    Patient: Josh Hoang   YOB: 1960   Date of Visit: 6/22/2022     Lyn Mac MD  [address] [place] [address]  Via Fax: [phone]    Dear Lyn Mac MD,      Thank you for referring Mr. Josh Hoang to [facility] for evaluation. My notes for this consultation are attached. If you have questions, please do not hesitate to call me. I look forward to following your patient along with you.       Sincerely,    Rohan Zuniga MD

## 2022-06-22 NOTE — PROGRESS NOTES
Neurology Consult      Subjective:      Treva Alexander is a 59 y.o. male who comes in today with his attendant. He is actually a closer approximation by distance to the [address] [place] [address] location so he will be seen there in 1 year. Has seizures no doubt localization-related with transition to complex partial etc.  Is wheelchair-bound and has a right spastic hemiparetic composure as usual.  Is on the Depakote extended release 500 mg taking 3/day. We will check an updated valproic acid level. No new medical surgical history. Is actively followed by psychiatry and primary care. He is an annual revisit and was last seen in April 2021. For some reason did not get labs requested on that occasion. Have been seeing him for quite a few years and I thought his exam was strictly baseline although less verbal today but cordial and an occasional eye contact but not much. Attendant says that this patient has no bad days at the [facility] and gets along well with everyone. Revisit 1 year. Current Outpatient Medications   Medication Sig Dispense Refill    Depakote  mg ER tablet TAKE THREE (3) TABLETS DAILY *BRAND MEDICALLY NECESSARY* 93 Tablet 1    TEGretol  mg SR tablet TAKE ONE TABLET TWICE A DAY *BRAND PREF* 60 Tablet 11    risperiDONE (RISPERDAL) 4 mg tablet Take 4 mg by mouth daily (after dinner).  OTHER True Track smart  System  Twice weekly ( check blood glucose)      tamsulosin (FLOMAX) 0.4 mg capsule Take 0.4 mg by mouth daily.  cetaphil (CETAPHIL) topical cream Apply  to affected area as needed for Dry Skin.  hydrocortisone valerate (WESTCORT) 0.2 % topical cream Apply  to affected area two (2) times a day. use thin layer       clobetasol (CLOBEX) 0.05 % lotion Apply  to affected area two (2) times a day.  sennosides (SENNA) 8.6 mg cap Take 2 Tabs by mouth daily.  cholecalciferol (VITAMIN D3) 400 unit tab tablet Take  by mouth daily.       loratadine (CLARITIN) 10 mg tablet Take 10 mg by mouth daily.  Fesoterodine (TOVIAZ) 4 mg SR tablet Take 8 mg by mouth daily.  polyethylene glycol (MIRALAX) 17 gram packet Take 17 g by mouth daily.  ferrous sulfate (IRON) 325 mg (65 mg iron) tablet Take  by mouth Daily (before breakfast).  risperiDONE (RISPERDAL) 2 mg tablet Take 2 mg by mouth daily.  simvastatin (ZOCOR) 40 mg tablet Take 20 mg by mouth nightly.  levothyroxine (SYNTHROID) 75 mcg tablet Take  by mouth Daily (before breakfast).  lisinopril (PRINIVIL, ZESTRIL) 5 mg tablet Take  by mouth daily.  therapeutic multivitamin (THERAGRAN) tablet Take 1 Tab by mouth daily.  tiZANidine (ZANAFLEX) 2 mg capsule Take 2 mg by mouth three (3) times daily.  fluvoxaMINE (LUVOX) 50 mg tablet Take  by mouth two (2) times a day.  etanercept (ENBREL) 50 mg/mL (0.98 mL) injection by SubCUTAneous route. No Known Allergies  Past Medical History:   Diagnosis Date    Cerebral palsy ([facility]     CP    Developmental delay     Diabetes ([facility]     Hypothyroidism     Mental retardation     Mild    Psoriasis     Psychiatric disorder     [facility]       No past surgical history on file.    Social History     Socioeconomic History    Marital status: SINGLE     Spouse name: Not on file    Number of children: Not on file    Years of education: Not on file    Highest education level: Not on file   Occupational History    Not on file   Tobacco Use    Smoking status: Never Smoker    Smokeless tobacco: Never Used   Substance and Sexual Activity    Alcohol use: No    Drug use: Yes     Types: Prescription    Sexual activity: Not on file   Other Topics Concern    Not on file   Social History Narrative    Not on file     Social Determinants of Health     Financial Resource Strain:     Difficulty of Paying Living Expenses: Not on file   Food Insecurity:     Worried About Running Out of Food in the Last Year: Not on file    [address] in the Last Year: Not on file   Transportation Needs:     Lack of Transportation (Medical): Not on file    Lack of Transportation (Non-Medical): Not on file   Physical Activity:     Days of Exercise per Week: Not on file    Minutes of Exercise per Session: Not on file   Stress:     Feeling of Stress : Not on file   Social Connections:     Frequency of Communication with Friends and Family: Not on file    Frequency of Social Gatherings with Friends and Family: Not on file    Attends Scientologist Services: Not on file    Active Member of [address] [place] [address] [employer] or Organizations: Not on file    Attends Club or Organization Meetings: Not on file    Marital Status: Not on file   Intimate Partner Violence:     Fear of Current or Ex-Partner: Not on file    Emotionally Abused: Not on file    Physically Abused: Not on file    Sexually Abused: Not on file   Housing Stability:     Unable to Pay for Housing in the Last Year: Not on file    Number of [place] in the Last Year: Not on file    Unstable Housing in the Last Year: Not on file      Family History   Family history unknown: Yes      Visit Vitals  /76 (BP 1 Location: Left arm, BP Patient Position: Sitting, BP Cuff Size: Adult)   Pulse (!) 50   Resp 13   SpO2 98%        Review of Systems:   A comprehensive review of systems was negative except for that written in the HPI. Neuro Exam:     Appearance: The patient is well developed, well nourished, and unable to provide a coherent history and is in no acute distress. Mental Status: Oriented to name by inference but otherwise nonverbal today. Mood and affect appropriate to baseline which is cordial no spontaneous speech and occasional eye contact and reserved. Cranial Nerves:   Intact visual fields? Fundi are benign. JONAH, EOM's full, no nystagmus, no ptosis. Facial sensation is normal. Corneal reflexes are intact. Facial movement is symmetric. Hearing is normal bilaterally. Palate is midline with normal sternocleidomastoid and trapezius muscles are normal. Tongue is midline. Motor:  5/5 strength in upper and lower proximal and distal muscles on the left and 4 - to 4 on the right with spastic hemiparetic features? Normal bulk and tone. No fasciculations. Reflexes:   Deep tendon reflexes 1-2+/4 and symmetrical.   Sensory:   Normal to touch, pinprick and vibration? Gait:   Came in a wheelchair. Tremor:   No tremor noted. Cerebellar:  No cerebellar signs present. Neurovascular:  Normal heart sounds and regular rhythm, peripheral pulses intact, and no carotid bruits. Assessment:   No breakthrough seizure history and will maintain him on the same Depakote extended release 500 mg 3/day. We will check an updated valproic acid level. Doing well and he looks strictly baseline. Happy early birthday. Revisit 1 year. Plan:   Revisit 1 year.   Signed by :  Vincent Nation MD

## 2022-06-28 ENCOUNTER — HOSPITAL ENCOUNTER (EMERGENCY)
Age: 62
Discharge: HOME | End: 2022-06-28
Attending: EMERGENCY MEDICINE
Payer: MEDICAID

## 2022-06-28 VITALS
DIASTOLIC BLOOD PRESSURE: 88 MMHG | HEART RATE: 73 BPM | WEIGHT: 147.93 LBS | RESPIRATION RATE: 16 BRPM | OXYGEN SATURATION: 98 % | BODY MASS INDEX: 25.25 KG/M2 | SYSTOLIC BLOOD PRESSURE: 102 MMHG | TEMPERATURE: 98.2 F | HEIGHT: 64 IN

## 2022-06-28 DIAGNOSIS — H93.8X2 EAR SWELLING, LEFT: Primary | ICD-10-CM

## 2022-06-28 LAB
ATRIAL RATE: 79 BPM
CALCULATED P AXIS, ECG09: 31 DEGREES
CALCULATED R AXIS, ECG10: -11 DEGREES
CALCULATED T AXIS, ECG11: 29 DEGREES
DIAGNOSIS, 93000: NORMAL
P-R INTERVAL, ECG05: 178 MS
Q-T INTERVAL, ECG07: 370 MS
QRS DURATION, ECG06: 100 MS
QTC CALCULATION (BEZET), ECG08: 424 MS
VENTRICULAR RATE, ECG03: 79 BPM

## 2022-06-28 PROCEDURE — 93005 ELECTROCARDIOGRAM TRACING: CPT

## 2022-06-28 PROCEDURE — 74011000250 HC RX REV CODE- 250: Performed by: EMERGENCY MEDICINE

## 2022-06-28 PROCEDURE — 99283 EMERGENCY DEPT VISIT LOW MDM: CPT

## 2022-06-28 PROCEDURE — 75810000289 HC I&D ABSCESS SIMP/COMP/MULT

## 2022-06-28 RX ORDER — CIPROFLOXACIN 500 MG/1
500 TABLET ORAL 2 TIMES DAILY
Qty: 14 TABLET | Refills: 0 | Status: SHIPPED | OUTPATIENT
Start: 2022-06-28 | End: 2022-07-05

## 2022-06-28 RX ADMIN — Medication 3 ML: at 12:52

## 2022-06-28 NOTE — ED PROVIDER NOTES
HPI   58-year-old man with a past medical history of cerebral palsy, diabetes, seizures, and nonverbal state who presents to the emergency department due to left ear swelling. History obtained from caregiver at bedside. Patient lives at a group home and it was noticed that he had some left ear swelling yesterday. The caregiver says the patient was monitored for 24 hours and there is no history of a fall or any trauma to his ear. He has not had any fevers. The ear has not seem to be bothering him. They were concerned he had a bug bite to his ear. No other complaints noted and no further history can be obtained secondary to the patient being nonverbal.    Past Medical History:   Diagnosis Date    Cerebral palsy ([facility] [address]     CP    Developmental delay     Diabetes ([facility]     Hypothyroidism     Mental retardation     Mild    Psoriasis     Psychiatric disorder     [facility] ([email or web address]        No past surgical history on file. Family History:   Family history unknown: Yes       Social History     Socioeconomic History    Marital status: SINGLE     Spouse name: Not on file    Number of children: Not on file    Years of education: Not on file    Highest education level: Not on file   Occupational History    Not on file   Tobacco Use    Smoking status: Never Smoker    Smokeless tobacco: Never Used   Substance and Sexual Activity    Alcohol use: No    Drug use: Yes     Types: Prescription    Sexual activity: Not on file   Other Topics Concern    Not on file   Social History Narrative    Not on file     Social Determinants of Health     Financial Resource Strain:     Difficulty of Paying Living Expenses: Not on file   Food Insecurity:     Worried About Running Out of Food in the Last Year: Not on file    Samir of Food in the Last Year: Not on file   Transportation Needs:     Lack of Transportation (Medical): Not on file    Lack of Transportation (Non-Medical):  Not on file   Physical Activity:     Days of Exercise per Week: Not on file    Minutes of Exercise per Session: Not on file   Stress:     Feeling of Stress : Not on file   Social Connections:     Frequency of Communication with Friends and Family: Not on file    Frequency of Social Gatherings with Friends and Family: Not on file    Attends Yazidi Services: Not on file    Active Member of [address] [place] [address] [employer] or Organizations: Not on file    Attends Club or Organization Meetings: Not on file    Marital Status: Not on file   Intimate Partner Violence:     Fear of Current or Ex-Partner: Not on file    Emotionally Abused: Not on file    Physically Abused: Not on file    Sexually Abused: Not on file   Housing Stability:     Unable to Pay for Housing in the Last Year: Not on file    Number of [place] in the Last Year: Not on file    Unstable Housing in the Last Year: Not on file         ALLERGIES: Patient has no known allergies. Review of Systems   Unable to perform a complete review of systems due to the patient's nonverbal state. Vitals:    06/28/22 1229 06/28/22 1237   BP: 97/65 102/88   Pulse: 73    Resp: 16    Temp: 98.2 °F (36.8 °C)    SpO2: 98%             Physical Exam  Constitutional:       Comments: Chronically ill-appearing. Not acutely distressed   HENT:      Ears:      Comments: Patient has localized swelling and fluctuance on the inner aspect of the upper part of the left auricle. There is some mild erythema on the top of the left auricle. No warmth. Normal tympanic membranes and ear canals bilaterally. No appreciable mastoid tenderness bilaterally  Eyes:      General: No scleral icterus. Extraocular Movements: Extraocular movements intact. Neck:      Comments: Trachea midline  Cardiovascular:      Comments: Regular rate and rhythm. Normal capillary refill  Pulmonary:      Effort: Pulmonary effort is normal. No respiratory distress. Musculoskeletal:         General: No deformity. Normal range of motion.       Cervical back: Normal range of motion. Skin:     General: Skin is warm and dry. Neurological:      Comments: Awake and alert. Nonverbal.          [facility]  ED Course as of 06/28/22 1411   Tue Jun 28, 2022   1257 EKG shows normal sinus rhythm with rate of 79 QTC of 424. No arrhythmias or ischemic changes noted. No evidence of QT prolongation. [MM]      ED Course User Index  [MM] Deandre Olvera MD   39-year-old man who presents with the above chief complaint. Vitals are stable. He has localized swelling to the top of his left auricle with some fluctuance and some mild erythema to the auricle. I&D performed and serous drainage was evacuated from this area of swelling. There is no blood or pus. It is unclear to me what the cause of this was. Maybe it is a localized inflammatory response from perichondritis. He is a diabetic so will be prescribed Cipro and referred to ENT. Patient discharged in stable condition. I&D Abcess Simple    Date/Time: 6/28/2022 2:15 PM  Performed by: Deandre Olvera MD  Authorized by: Deandre Olvera MD     Consent:     Consent obtained:  Verbal    Consent given by:  Guardian    Risks discussed:  Bleeding, incomplete drainage and pain    Alternatives discussed:  Referral  Location:     Indications for incision and drainage: Fluctuance and swelling on the top inner portion of the left auricle. Size:  2 x 2 cm    Location: Left ear. Pre-procedure details:     Skin preparation:  Chloraprep  Anesthesia (see MAR for exact dosages): Anesthesia method:  Topical application    Topical anesthetic:  LET  Procedure type:     Complexity:  Simple  Procedure details:     Needle aspiration: yes      Needle size:  18 G    Incision depth:  Dermal    Drainage:  Serous    Drainage amount: Moderate    Wound treatment:  Wound left open    Packing materials:  1/4 in gauze  Post-procedure details:     Patient tolerance of procedure:   Tolerated well, no immediate complications

## 2022-06-28 NOTE — ED TRIAGE NOTES
Pt is a resident at [facility] home in [facility]; here for evaluation of left ear swelling that started last night; caregiver thinks pt was bitten by something.

## 2022-06-28 NOTE — DISCHARGE INSTRUCTIONS
Please call the number provided to schedule an appointment with ENT as soon as possible. Patient should take the antibiotics as prescribed. Keep the dressing on the ear for the next 12 hours. Return with any concerns.

## 2022-07-08 ENCOUNTER — HOSPITAL ENCOUNTER (EMERGENCY)
Age: 62
Discharge: HOME | End: 2022-07-08
Attending: STUDENT IN AN ORGANIZED HEALTH CARE EDUCATION/TRAINING PROGRAM
Payer: MEDICAID

## 2022-07-08 VITALS
RESPIRATION RATE: 14 BRPM | TEMPERATURE: 97.1 F | HEART RATE: 66 BPM | OXYGEN SATURATION: 90 % | DIASTOLIC BLOOD PRESSURE: 54 MMHG | SYSTOLIC BLOOD PRESSURE: 95 MMHG

## 2022-07-08 DIAGNOSIS — L02.91 ABSCESS: Primary | ICD-10-CM

## 2022-07-08 PROCEDURE — 99283 EMERGENCY DEPT VISIT LOW MDM: CPT

## 2022-07-08 PROCEDURE — 75810000289 HC I&D ABSCESS SIMP/COMP/MULT

## 2022-07-08 PROCEDURE — 87205 SMEAR GRAM STAIN: CPT

## 2022-07-08 PROCEDURE — 74011000250 HC RX REV CODE- 250: Performed by: STUDENT IN AN ORGANIZED HEALTH CARE EDUCATION/TRAINING PROGRAM

## 2022-07-08 RX ORDER — SULFAMETHOXAZOLE AND TRIMETHOPRIM 800; 160 MG/1; MG/1
1 TABLET ORAL 2 TIMES DAILY
Qty: 20 TABLET | Refills: 0 | Status: SHIPPED | OUTPATIENT
Start: 2022-07-08 | End: 2022-07-18

## 2022-07-08 RX ADMIN — Medication 3 ML: at 09:42

## 2022-07-08 NOTE — ED TRIAGE NOTES
Pt here from PCP for eval of L ear swelling. Pt has had swelling x2 weeks, was drained here 2 weeks ago and it began swelling again after three days. Denies fevers. Unknown injury/trauma to ear. Significant swelling to L ear noted.

## 2022-07-08 NOTE — ED NOTES
Pt's caregiver Hayley Fofana provided w/DC instructions including new Rx for Bactrim. Instructed to f/u w/PCP and ENT. Verbalized understanding of DC instructions, denied questions/concerns. Denied need for assistance wheeling pt out of ED. Pt was well appearing in NAD on DC.

## 2022-07-08 NOTE — ED PROVIDER NOTES
64-year-old male with history of cerebral palsy, seizures presents to the ED with chief complaint of atraumatic left ear swelling for the past 5 to 6 days. Patient seen in the ED 1.5 weeks ago for same, had incision and drainage and was started on ciprofloxacin. Swelling began to return after approximately 3 to 4 days. Patient completed course of ciprofloxacin 2 days ago. Patient denies any significant pain. No fevers, chills, nausea, vomiting, chest pain, difficulty breathing, abdominal pain, urinary symptoms, or bowel symptoms. Caretaker has made appointment with ENT but it is not until August 1. The history is provided by the patient and a caregiver. Ear Pain   Pertinent negatives include no ear discharge, no headaches, no rhinorrhea, no abdominal pain, no diarrhea, no vomiting, no neck pain, no cough and no rash. Past Medical History:   Diagnosis Date    Cerebral palsy ([facility]     CP    Developmental delay     Diabetes ([facility]     Hypothyroidism     Mental retardation     Mild    Psoriasis     Psychiatric disorder     [facility]        No past surgical history on file.       Family History:   Family history unknown: Yes       Social History     Socioeconomic History    Marital status: SINGLE     Spouse name: Not on file    Number of children: Not on file    Years of education: Not on file    Highest education level: Not on file   Occupational History    Not on file   Tobacco Use    Smoking status: Never Smoker    Smokeless tobacco: Never Used   Substance and Sexual Activity    Alcohol use: No    Drug use: Yes     Types: Prescription    Sexual activity: Not on file   Other Topics Concern    Not on file   Social History Narrative    Not on file     Social Determinants of Health     Financial Resource Strain:     Difficulty of Paying Living Expenses: Not on file   Food Insecurity:     Worried About Running Out of Food in the Last Year: Not on file    [address] in the Last Year: Not on file   Transportation Needs:     Lack of Transportation (Medical): Not on file    Lack of Transportation (Non-Medical): Not on file   Physical Activity:     Days of Exercise per Week: Not on file    Minutes of Exercise per Session: Not on file   Stress:     Feeling of Stress : Not on file   Social Connections:     Frequency of Communication with Friends and Family: Not on file    Frequency of Social Gatherings with Friends and Family: Not on file    Attends [employer] Services: Not on file    Active Member of [address] [place] [address] [employer] or Organizations: Not on file    Attends Club or Organization Meetings: Not on file    Marital Status: Not on file   Intimate Partner Violence:     Fear of Current or Ex-Partner: Not on file    Emotionally Abused: Not on file    Physically Abused: Not on file    Sexually Abused: Not on file   Housing Stability:     Unable to Pay for Housing in the Last Year: Not on file    Number of [place] in the Last Year: Not on file    Unstable Housing in the Last Year: Not on file         ALLERGIES: Patient has no known allergies. Review of Systems   Constitutional: Negative for chills and fever. HENT: Negative for congestion, ear discharge, ear pain and rhinorrhea.         +L ear swelling   Respiratory: Negative for cough, shortness of breath and wheezing. Cardiovascular: Negative for chest pain and leg swelling. Gastrointestinal: Negative for abdominal pain, constipation, diarrhea, nausea and vomiting. Genitourinary: Negative for difficulty urinating, dysuria and hematuria. Musculoskeletal: Negative for back pain and neck pain. Skin: Negative for color change and rash. Neurological: Negative for weakness, numbness and headaches. Psychiatric/Behavioral: Negative for behavioral problems and confusion.        Vitals:    07/08/22 0930   BP: (!) 95/54   Pulse: 66   Resp: 14   Temp: 97.1 °F (36.2 °C)   SpO2: 90%            Physical Exam  Constitutional: General: He is not in acute distress. Appearance: He is well-developed. HENT:      Head: Normocephalic and atraumatic. Right Ear: External ear normal.      Ears:      Comments: Swelling and fluctuance to the superior left auricle. No drainage, no induration, no tenderness. No significant erythema. Eyes:      Conjunctiva/sclera: Conjunctivae normal.      Pupils: Pupils are equal, round, and reactive to light. Neck:      Trachea: No tracheal deviation. Cardiovascular:      Rate and Rhythm: Normal rate and regular rhythm. Heart sounds: No murmur heard. No friction rub. No gallop. Pulmonary:      Effort: No respiratory distress. Breath sounds: Normal breath sounds. Abdominal:      General: Bowel sounds are normal. There is no distension. Palpations: Abdomen is soft. Tenderness: There is no abdominal tenderness. Musculoskeletal:         General: No deformity. Cervical back: Neck supple. Skin:     General: Skin is warm and dry. Neurological:      Mental Status: He is alert and oriented to person, place, and time. MDM  Number of Diagnoses or Management Options  Diagnosis management comments: 68-year-old male presenting with left ear swelling. Will perform incision and drainage at bedside and send for wound culture. We will plan on starting on a different antibiotic given history of diabetes. Patient will follow-up with ENT. No evidence of systemic infection at this time. Amount and/or Complexity of Data Reviewed  Clinical lab tests: ordered           I&D Abcess Simple    Date/Time: 7/8/2022 10:10 AM  Performed by: Quentin Amado MD  Authorized by:  Quentin Amado MD     Consent:     Consent obtained:  Verbal    Consent given by:  Guardian    Risks discussed:  Bleeding and incomplete drainage    Alternatives discussed:  No treatment  Location:     Type:  Fluid collection    Location:  Head    Head location:  L external ear  Pre-procedure details: Skin preparation:  Betadine  Anesthesia (see MAR for exact dosages): Anesthesia method:  Topical application    Topical anesthetic:  LET  Procedure type:     Complexity:  Simple  Procedure details:     Needle aspiration: yes      Needle size:  18 G    Drainage:  Serosanguinous    Drainage amount: Moderate    Wound treatment:  Wound left open    Packing materials:  None  Post-procedure details:     Patient tolerance of procedure: Tolerated well, no immediate complications      89:16 AM  Fluid collection drained, serosanguineous, perhaps mild purulence. Will start on Bactrim, send wound culture, have him follow-up with ENT. Discharge Note:  The patient has been re-evaluated and is ready for discharge. Reviewed available results with patient. Counseled patient on diagnosis and care plan. Patient has expressed understanding, and all questions have been answered. Patient agrees with plan and agrees to follow up as recommended, or to return to the ED if their symptoms worsen. Discharge instructions have been provided and explained to the patient, along with reasons to return to the ED. PLAN:  Current Discharge Medication List      START taking these medications    Details   trimethoprim-sulfamethoxazole (Bactrim DS) 160-800 mg per tablet Take 1 Tablet by mouth two (2) times a day for 10 days. Qty: 20 Tablet, Refills: 0  Start date: 7/8/2022, End date: 7/18/2022         1. Bactrim  2. F/u as below  Follow-up Information     Follow up With Specialties Details Why Contact Info    Betty Hebert MD Family Medicine In 1 week  [place] [id number] [phone]      Advanced Otolaryngology  In 1 week  [address]  [address]  [phone]        3.   Return to ED if worse

## 2022-07-10 LAB
BACTERIA SPEC CULT: NORMAL
GRAM STN SPEC: NORMAL
GRAM STN SPEC: NORMAL
SERVICE CMNT-IMP: NORMAL

## 2022-07-12 DIAGNOSIS — G40.209 PARTIAL EPILEPSY WITH IMPAIRMENT OF CONSCIOUSNESS (HCC): ICD-10-CM

## 2022-07-12 RX ORDER — DIVALPROEX SODIUM 500 MG/1
TABLET, EXTENDED RELEASE ORAL
Qty: 93 TABLET | Refills: 11 | Status: SHIPPED | OUTPATIENT
Start: 2022-07-12

## 2022-10-12 DIAGNOSIS — G40.209 PARTIAL EPILEPSY WITH IMPAIRMENT OF CONSCIOUSNESS (HCC): ICD-10-CM

## 2022-10-12 RX ORDER — CARBAMAZEPINE 200 MG/1
TABLET, EXTENDED RELEASE ORAL
Qty: 62 TABLET | Refills: 11 | Status: SHIPPED | OUTPATIENT
Start: 2022-10-12

## 2022-12-01 ENCOUNTER — APPOINTMENT (OUTPATIENT)
Dept: CT IMAGING | Age: 62
End: 2022-12-01
Attending: EMERGENCY MEDICINE
Payer: MEDICAID

## 2022-12-01 ENCOUNTER — HOSPITAL ENCOUNTER (EMERGENCY)
Age: 62
Discharge: HOME | End: 2022-12-01
Attending: EMERGENCY MEDICINE
Payer: MEDICAID

## 2022-12-01 VITALS
HEART RATE: 72 BPM | RESPIRATION RATE: 14 BRPM | OXYGEN SATURATION: 97 % | SYSTOLIC BLOOD PRESSURE: 122 MMHG | DIASTOLIC BLOOD PRESSURE: 66 MMHG | WEIGHT: 147.71 LBS | TEMPERATURE: 97.1 F | BODY MASS INDEX: 25.35 KG/M2

## 2022-12-01 DIAGNOSIS — K52.9 COLITIS: Primary | ICD-10-CM

## 2022-12-01 DIAGNOSIS — J18.9 PNEUMONIA OF RIGHT MIDDLE LOBE DUE TO INFECTIOUS ORGANISM: ICD-10-CM

## 2022-12-01 LAB
ALBUMIN SERPL-MCNC: 2.1 G/DL (ref 3.5–5)
ALBUMIN/GLOB SERPL: 0.4 {RATIO} (ref 1.1–2.2)
ALP SERPL-CCNC: 70 U/L (ref 45–117)
ALT SERPL-CCNC: 13 U/L (ref 12–78)
ANION GAP SERPL CALC-SCNC: 5 MMOL/L (ref 5–15)
AST SERPL-CCNC: 12 U/L (ref 15–37)
BASOPHILS # BLD: 0 K/UL (ref 0–0.1)
BASOPHILS NFR BLD: 0 % (ref 0–1)
BILIRUB SERPL-MCNC: 0.2 MG/DL (ref 0.2–1)
BUN SERPL-MCNC: 14 MG/DL (ref 6–20)
BUN/CREAT SERPL: 19 (ref 12–20)
CALCIUM SERPL-MCNC: 8 MG/DL (ref 8.5–10.1)
CHLORIDE SERPL-SCNC: 101 MMOL/L (ref 97–108)
CO2 SERPL-SCNC: 33 MMOL/L (ref 21–32)
CREAT SERPL-MCNC: 0.74 MG/DL (ref 0.7–1.3)
DIFFERENTIAL METHOD BLD: ABNORMAL
EOSINOPHIL # BLD: 0 K/UL (ref 0–0.4)
EOSINOPHIL NFR BLD: 0 % (ref 0–7)
ERYTHROCYTE [DISTWIDTH] IN BLOOD BY AUTOMATED COUNT: 12.8 % (ref 11.5–14.5)
GLOBULIN SER CALC-MCNC: 5.9 G/DL (ref 2–4)
GLUCOSE SERPL-MCNC: 71 MG/DL (ref 65–100)
HCT VFR BLD AUTO: 32.8 % (ref 36.6–50.3)
HGB BLD-MCNC: 10.5 G/DL (ref 12.1–17)
IMM GRANULOCYTES # BLD AUTO: 0 K/UL (ref 0–0.04)
IMM GRANULOCYTES NFR BLD AUTO: 0 % (ref 0–0.5)
LYMPHOCYTES # BLD: 0.9 K/UL (ref 0.8–3.5)
LYMPHOCYTES NFR BLD: 12 % (ref 12–49)
MCH RBC QN AUTO: 32.5 PG (ref 26–34)
MCHC RBC AUTO-ENTMCNC: 32 G/DL (ref 30–36.5)
MCV RBC AUTO: 101.5 FL (ref 80–99)
MONOCYTES # BLD: 1.4 K/UL (ref 0–1)
MONOCYTES NFR BLD: 20 % (ref 5–13)
NEUTS SEG # BLD: 4.8 K/UL (ref 1.8–8)
NEUTS SEG NFR BLD: 68 % (ref 32–75)
NRBC # BLD: 0 K/UL (ref 0–0.01)
NRBC BLD-RTO: 0 PER 100 WBC
PLATELET # BLD AUTO: 287 K/UL (ref 150–400)
PMV BLD AUTO: 9 FL (ref 8.9–12.9)
POTASSIUM SERPL-SCNC: 3.4 MMOL/L (ref 3.5–5.1)
PROT SERPL-MCNC: 8 G/DL (ref 6.4–8.2)
RBC # BLD AUTO: 3.23 M/UL (ref 4.1–5.7)
RBC MORPH BLD: ABNORMAL
SODIUM SERPL-SCNC: 139 MMOL/L (ref 136–145)
WBC # BLD AUTO: 7.1 K/UL (ref 4.1–11.1)

## 2022-12-01 PROCEDURE — 85025 COMPLETE CBC W/AUTO DIFF WBC: CPT

## 2022-12-01 PROCEDURE — 74177 CT ABD & PELVIS W/CONTRAST: CPT

## 2022-12-01 PROCEDURE — 74011000636 HC RX REV CODE- 636: Performed by: EMERGENCY MEDICINE

## 2022-12-01 PROCEDURE — 99285 EMERGENCY DEPT VISIT HI MDM: CPT

## 2022-12-01 PROCEDURE — 80053 COMPREHEN METABOLIC PANEL: CPT

## 2022-12-01 PROCEDURE — 36415 COLL VENOUS BLD VENIPUNCTURE: CPT

## 2022-12-01 RX ORDER — KETOCONAZOLE 20 MG/ML
SHAMPOO, SUSPENSION TOPICAL DAILY PRN
COMMUNITY

## 2022-12-01 RX ORDER — AMOXICILLIN AND CLAVULANATE POTASSIUM 875; 125 MG/1; MG/1
1 TABLET, FILM COATED ORAL 2 TIMES DAILY
Qty: 14 TABLET | Refills: 0 | Status: SHIPPED | OUTPATIENT
Start: 2022-12-01

## 2022-12-01 RX ADMIN — IOPAMIDOL 100 ML: 755 INJECTION, SOLUTION INTRAVENOUS at 11:35

## 2022-12-01 NOTE — ED TRIAGE NOTES
Patient arrives with caretaker via wheelchair. Patient is nonverbal.  Alexandru Luna states patient has been more agitated recently and has had some blood noted in his undergarments. Patient was inspected for hemorrhoids. Additionally, caretaker states she noticed a small wound just above the anus. No known fevers.

## 2022-12-01 NOTE — ED PROVIDER NOTES
41-year-old male with history of cerebral palsy, diabetes, hypothyroidism, seizures presents with his caretaker with concern for potential hemorrhoids. He apparently is been having some blood in his depends for the past several days to maybe weeks. He has no history of hemorrhoids or GI bleeding. There is no report of constipation, he may have had some loose stools recently as well. No fever. He does not seem to be in pain. The history is provided by a caregiver and medical records. Hemorrhoids  This is a new problem. Wound Check        Past Medical History:   Diagnosis Date    Cerebral palsy ([facility]     CP    Developmental delay     Diabetes ([facility]     Hypothyroidism     Mental retardation     Mild    Psoriasis     Psychiatric disorder     [facility]        No past surgical history on file. Family History:   Family history unknown: Yes       Social History     Socioeconomic History    Marital status: SINGLE     Spouse name: Not on file    Number of children: Not on file    Years of education: Not on file    Highest education level: Not on file   Occupational History    Not on file   Tobacco Use    Smoking status: Never    Smokeless tobacco: Never   Substance and Sexual Activity    Alcohol use: No    Drug use: Yes     Types: Prescription    Sexual activity: Not on file   Other Topics Concern    Not on file   Social History Narrative    Not on file     Social Determinants of Health     Financial Resource Strain: Not on file   Food Insecurity: Not on file   Transportation Needs: Not on file   Physical Activity: Not on file   Stress: Not on file   Social Connections: Not on file   Intimate Partner Violence: Not on file   Housing Stability: Not on file         ALLERGIES: Patient has no known allergies. Review of Systems   Unable to perform ROS: Patient nonverbal   Gastrointestinal:  Positive for hemorrhoids. There were no vitals filed for this visit.          Physical Exam  Vitals and nursing note reviewed. Exam conducted with a chaperone present. Constitutional:       General: He is not in acute distress. Appearance: He is well-developed. He is not ill-appearing, toxic-appearing or diaphoretic. HENT:      Head: Normocephalic and atraumatic. Nose: Nose normal.      Mouth/Throat:      Mouth: Mucous membranes are moist.      Pharynx: Oropharynx is clear. Eyes:      Extraocular Movements: Extraocular movements intact. Conjunctiva/sclera: Conjunctivae normal.      Pupils: Pupils are equal, round, and reactive to light. Cardiovascular:      Rate and Rhythm: Normal rate and regular rhythm. Pulses: Normal pulses. Heart sounds: No murmur heard. Pulmonary:      Effort: Pulmonary effort is normal. No respiratory distress. Breath sounds: Normal breath sounds. No wheezing. Chest:      Chest wall: No mass or tenderness. Abdominal:      General: There is no distension. Palpations: Abdomen is soft. Tenderness: There is no abdominal tenderness. There is no guarding. Genitourinary:     Comments: No external hemorrhoids seen. There is a white purulent type drainage from the rectum. No other injuries seen  Musculoskeletal:         General: No swelling, tenderness, deformity or signs of injury. Normal range of motion. Cervical back: Normal range of motion and neck supple. No rigidity. No muscular tenderness. Right lower leg: No tenderness. No edema. Left lower leg: No tenderness. No edema. Skin:     General: Skin is warm and dry. Capillary Refill: Capillary refill takes less than 2 seconds. Neurological:      Mental Status: He is alert. Mental status is at baseline. Psychiatric:         Behavior: Behavior normal.        MDM  Number of Diagnoses or Management Options  Diagnosis management comments: 20-year-old male presents as above with concern for rectal bleeding. He is found to have colitis along with right middle lobe pneumonia.   We will treat with Augmentin as this should cover both of these etiologies. He should follow-up in about 1 week with his doctor to ensure that he is improving.        Amount and/or Complexity of Data Reviewed  Clinical lab tests: reviewed  Tests in the radiology section of CPT®: reviewed  Decide to obtain previous medical records or to obtain history from someone other than the patient: yes           Procedures

## 2023-01-25 ENCOUNTER — HOSPITAL ENCOUNTER (EMERGENCY)
Age: 63
Discharge: HOME | End: 2023-01-25
Attending: EMERGENCY MEDICINE
Payer: MEDICAID

## 2023-01-25 ENCOUNTER — APPOINTMENT (OUTPATIENT)
Dept: CT IMAGING | Age: 63
End: 2023-01-25
Attending: EMERGENCY MEDICINE
Payer: MEDICAID

## 2023-01-25 VITALS
RESPIRATION RATE: 14 BRPM | TEMPERATURE: 99 F | HEART RATE: 91 BPM | SYSTOLIC BLOOD PRESSURE: 167 MMHG | DIASTOLIC BLOOD PRESSURE: 71 MMHG | OXYGEN SATURATION: 93 %

## 2023-01-25 DIAGNOSIS — K59.00 CONSTIPATION, UNSPECIFIED CONSTIPATION TYPE: ICD-10-CM

## 2023-01-25 DIAGNOSIS — R10.84 ABDOMINAL PAIN, GENERALIZED: Primary | ICD-10-CM

## 2023-01-25 DIAGNOSIS — K52.9 COLITIS: ICD-10-CM

## 2023-01-25 LAB
ALBUMIN SERPL-MCNC: 2 G/DL (ref 3.5–5)
ALBUMIN/GLOB SERPL: 0.3 (ref 1.1–2.2)
ALP SERPL-CCNC: 79 U/L (ref 45–117)
ALT SERPL-CCNC: 19 U/L (ref 12–78)
ANION GAP SERPL CALC-SCNC: 7 MMOL/L (ref 5–15)
AST SERPL-CCNC: 10 U/L (ref 15–37)
BASOPHILS # BLD: 0 K/UL (ref 0–0.1)
BASOPHILS NFR BLD: 0 % (ref 0–1)
BILIRUB SERPL-MCNC: 0.2 MG/DL (ref 0.2–1)
BUN SERPL-MCNC: 13 MG/DL (ref 6–20)
BUN/CREAT SERPL: 16 (ref 12–20)
CALCIUM SERPL-MCNC: 8.4 MG/DL (ref 8.5–10.1)
CHLORIDE SERPL-SCNC: 96 MMOL/L (ref 97–108)
CO2 SERPL-SCNC: 31 MMOL/L (ref 21–32)
CREAT SERPL-MCNC: 0.82 MG/DL (ref 0.7–1.3)
DIFFERENTIAL METHOD BLD: ABNORMAL
EOSINOPHIL # BLD: 0 K/UL (ref 0–0.4)
EOSINOPHIL NFR BLD: 0 % (ref 0–7)
ERYTHROCYTE [DISTWIDTH] IN BLOOD BY AUTOMATED COUNT: 12.9 % (ref 11.5–14.5)
GLOBULIN SER CALC-MCNC: 6.2 G/DL (ref 2–4)
GLUCOSE SERPL-MCNC: 256 MG/DL (ref 65–100)
HCT VFR BLD AUTO: 33.2 % (ref 36.6–50.3)
HGB BLD-MCNC: 10.7 G/DL (ref 12.1–17)
IMM GRANULOCYTES # BLD AUTO: 0 K/UL
IMM GRANULOCYTES NFR BLD AUTO: 0 %
LACTATE SERPL-SCNC: 1.6 MMOL/L (ref 0.4–2)
LACTATE SERPL-SCNC: 2.5 MMOL/L (ref 0.4–2)
LYMPHOCYTES # BLD: 0.5 K/UL (ref 0.8–3.5)
LYMPHOCYTES NFR BLD: 5 % (ref 12–49)
MCH RBC QN AUTO: 31.4 PG (ref 26–34)
MCHC RBC AUTO-ENTMCNC: 32.2 G/DL (ref 30–36.5)
MCV RBC AUTO: 97.4 FL (ref 80–99)
MONOCYTES # BLD: 0.9 K/UL (ref 0–1)
MONOCYTES NFR BLD: 10 % (ref 5–13)
NEUTS BAND NFR BLD MANUAL: 4 % (ref 0–6)
NEUTS SEG # BLD: 8 K/UL (ref 1.8–8)
NEUTS SEG NFR BLD: 81 % (ref 32–75)
NRBC # BLD: 0 K/UL (ref 0–0.01)
NRBC BLD-RTO: 0 PER 100 WBC
PLATELET # BLD AUTO: 337 K/UL (ref 150–400)
PMV BLD AUTO: 9.4 FL (ref 8.9–12.9)
POTASSIUM SERPL-SCNC: 4 MMOL/L (ref 3.5–5.1)
PROT SERPL-MCNC: 8.2 G/DL (ref 6.4–8.2)
RBC # BLD AUTO: 3.41 M/UL (ref 4.1–5.7)
RBC MORPH BLD: ABNORMAL
SODIUM SERPL-SCNC: 134 MMOL/L (ref 136–145)
WBC # BLD AUTO: 9.4 K/UL (ref 4.1–11.1)

## 2023-01-25 PROCEDURE — 74177 CT ABD & PELVIS W/CONTRAST: CPT

## 2023-01-25 PROCEDURE — 96361 HYDRATE IV INFUSION ADD-ON: CPT

## 2023-01-25 PROCEDURE — 87040 BLOOD CULTURE FOR BACTERIA: CPT

## 2023-01-25 PROCEDURE — 74011000636 HC RX REV CODE- 636: Performed by: EMERGENCY MEDICINE

## 2023-01-25 PROCEDURE — 80053 COMPREHEN METABOLIC PANEL: CPT

## 2023-01-25 PROCEDURE — 36415 COLL VENOUS BLD VENIPUNCTURE: CPT

## 2023-01-25 PROCEDURE — 74011250636 HC RX REV CODE- 250/636: Performed by: EMERGENCY MEDICINE

## 2023-01-25 PROCEDURE — 71260 CT THORAX DX C+: CPT

## 2023-01-25 PROCEDURE — 93005 ELECTROCARDIOGRAM TRACING: CPT

## 2023-01-25 PROCEDURE — 99285 EMERGENCY DEPT VISIT HI MDM: CPT

## 2023-01-25 PROCEDURE — 85025 COMPLETE CBC W/AUTO DIFF WBC: CPT

## 2023-01-25 PROCEDURE — 96374 THER/PROPH/DIAG INJ IV PUSH: CPT

## 2023-01-25 PROCEDURE — 83605 ASSAY OF LACTIC ACID: CPT

## 2023-01-25 RX ORDER — AMOXICILLIN AND CLAVULANATE POTASSIUM 875; 125 MG/1; MG/1
1 TABLET, FILM COATED ORAL 2 TIMES DAILY
Qty: 14 TABLET | Refills: 0 | Status: SHIPPED | OUTPATIENT
Start: 2023-01-25

## 2023-01-25 RX ORDER — ONDANSETRON 2 MG/ML
4 INJECTION INTRAMUSCULAR; INTRAVENOUS
Status: COMPLETED | OUTPATIENT
Start: 2023-01-25 | End: 2023-01-25

## 2023-01-25 RX ORDER — SODIUM CHLORIDE 0.9 % (FLUSH) 0.9 %
5-40 SYRINGE (ML) INJECTION AS NEEDED
Status: DISCONTINUED | OUTPATIENT
Start: 2023-01-25 | End: 2023-01-26 | Stop reason: HOSPADM

## 2023-01-25 RX ORDER — SODIUM CHLORIDE 0.9 % (FLUSH) 0.9 %
5-40 SYRINGE (ML) INJECTION EVERY 8 HOURS
Status: DISCONTINUED | OUTPATIENT
Start: 2023-01-25 | End: 2023-01-26 | Stop reason: HOSPADM

## 2023-01-25 RX ADMIN — ONDANSETRON 4 MG: 2 INJECTION INTRAMUSCULAR; INTRAVENOUS at 19:48

## 2023-01-25 RX ADMIN — IOPAMIDOL 100 ML: 755 INJECTION, SOLUTION INTRAVENOUS at 20:38

## 2023-01-25 RX ADMIN — SODIUM CHLORIDE 1000 ML: 9 INJECTION, SOLUTION INTRAVENOUS at 21:02

## 2023-01-25 RX ADMIN — SODIUM CHLORIDE 1000 ML: 9 INJECTION, SOLUTION INTRAVENOUS at 19:48

## 2023-01-25 NOTE — ED TRIAGE NOTES
Per group home employees, pt was picked up from day support and was leaning forward and \"dont look like himself\" Day support told care givers pt was \"in the bathroom \" all day with loose watery stools, pt is noverbal.

## 2023-01-26 NOTE — ED NOTES
The patient was discharged home in stable condition with care givers from [facility]. The patient is alert and oriented, in no respiratory distress and discharge vital signs obtained. The patient's diagnosis, condition and treatment were explained to the patients caregivers. The patients caregivers expressed understanding. Prescriptions given to pharmacy. No work/school note given. A discharge plan has been developed. A  was not involved in the process. Aftercare instructions were given to the patients caregivers. Pt discharged from the ED via w/c by caregivers.

## 2023-01-26 NOTE — ED PROVIDER NOTES
Have to get her out of most withdrawal history of cerebral palsy with developmental delay and mental retardation, diabetes, hypothyroidism, seizures, psoriasis. He presents accompanied by staff from his group home. They provide the history. They report that he has \"not been acting himself today. \"  He has been bent over in apparent pain intermittently. He was in the \"bathroom all day. \"  He has had loose, watery stools. He was able to eat dinner tonight. They report that he has had ongoing issues with his bowels. He currently has frequent bowel movements. He was seen here last month and diagnosed with colitis and pneumonia. He was prescribed Augmentin. He had a colonoscopy done last week. It apparently showed ulcerative colitis. He was prescribed a new medication. It seemed to help initially until today. No vomiting. No fever. He is unable to provide any history. The pain seems worse with coughing and movement. Past Medical History:   Diagnosis Date    Cerebral palsy ([facility]     CP    Developmental delay     Diabetes ([facility]     Hypothyroidism     Mental retardation     Mild    Psoriasis     Psychiatric disorder     Seizures ([facility]        No past surgical history on file.       Family History:   Family history unknown: Yes       Social History     Socioeconomic History    Marital status: SINGLE     Spouse name: Not on file    Number of children: Not on file    Years of education: Not on file    Highest education level: Not on file   Occupational History    Not on file   Tobacco Use    Smoking status: Never    Smokeless tobacco: Never   Substance and Sexual Activity    Alcohol use: No    Drug use: Yes     Types: Prescription    Sexual activity: Not on file   Other Topics Concern    Not on file   Social History Narrative    Not on file     Social Determinants of Health     Financial Resource Strain: Not on file   Food Insecurity: Not on file   Transportation Needs: Not on file   Physical Activity: Not on file   Stress: Not on file   Social Connections: Not on file   Intimate Partner Violence: Not on file   Housing Stability: Not on file         ALLERGIES: Patient has no known allergies. Review of Systems   Unable to perform ROS: Patient nonverbal     Vitals:    01/25/23 1837 01/25/23 1951 01/25/23 2042 01/25/23 2100   BP: (!) 111/56 (!) 126/43 (!) 141/55 137/63   Pulse: 97 97 95 90   Resp: 16 9 12 20   Temp: 99 °F (37.2 °C)   99.3 °F (37.4 °C)   SpO2: 95% 94% 97% 95%            Physical Exam  Vitals and nursing note reviewed. Constitutional:       Appearance: He is well-developed. Comments: Cerebral palsy, developmental delay, mental retardation. HENT:      Head: Normocephalic and atraumatic. Eyes:      Conjunctiva/sclera: Conjunctivae normal.   Neck:      Trachea: No tracheal deviation. Cardiovascular:      Rate and Rhythm: Normal rate and regular rhythm. Heart sounds: Normal heart sounds. No murmur heard. No friction rub. No gallop. Pulmonary:      Effort: Pulmonary effort is normal.      Breath sounds: Normal breath sounds. Abdominal:      Palpations: Abdomen is soft. Tenderness: There is no abdominal tenderness. Musculoskeletal:         General: No deformity. Cervical back: Neck supple. Skin:     General: Skin is warm and dry. Neurological:      Mental Status: He is alert. Comments: oriented        Medical Decision Making  Amount and/or Complexity of Data Reviewed  Labs: ordered. Radiology: ordered. ECG/medicine tests: ordered. Risk  Prescription drug management. Procedures    EKG: Normal sinus rhythm; rate of 97; artifact; normal ST, Raul Hernandez MD  8:05 PM    Progress Note:  Results, treatment, and follow up plan have been discussed with caretakers. Questions were answered. Fredis West MD    Assessment/plan: Patient is from a group home. He was diagnosed with colitis last month and prescribed Augmentin.   He followed up for a colonoscopy last week. Per caretakers, they are still waiting on the results. He was prescribed a new medication after a colonoscopy. He presents after having a lot of liquid bowel movements today, and he has appeared to be bent over in pain at times. Differential diagnosis includes small bowel obstruction, colitis, constipation, appendicitis, and others. Favor constipation. Reassuring appearance/exam with stable vital signs. He was able to eat dinner prior to arrival.  CBC, CMP okay. Initial lactate 2.5. Repeat lactate after fluids 1.6. CT shows:    1. Chronic inflammatory or infectious colitis of the sigmoid colon and rectum, without evidence of bowel obstruction, perforation, or abscess. 2. Moderate to severe constipation proximal to the sigmoid colon. 3. Diffuse bladder wall thickening, which can be seen with cystitis. This can be  correlated with urinalysis and urinary culture assessment if indicated. 4. Complete resolution of right lower lobe pneumonia. 5. Small focus of airspace disease in the right upper lobe. I have recommended MiraLAX. Augmentin. PCP/GI follow-up. Return precautions.   Lana Arzola MD  10:40 PM

## 2023-01-26 NOTE — ED NOTES
Pt is resting on stretcher with care givers at the bedside. According to caregivers pt is more alert and active then he was earlier in the day.

## 2023-01-27 LAB
ATRIAL RATE: 97 BPM
CALCULATED P AXIS, ECG09: 22 DEGREES
CALCULATED R AXIS, ECG10: -13 DEGREES
CALCULATED T AXIS, ECG11: 28 DEGREES
DIAGNOSIS, 93000: NORMAL
P-R INTERVAL, ECG05: 122 MS
Q-T INTERVAL, ECG07: 330 MS
QRS DURATION, ECG06: 70 MS
QTC CALCULATION (BEZET), ECG08: 419 MS
VENTRICULAR RATE, ECG03: 97 BPM

## 2023-02-24 ENCOUNTER — HOSPITAL ENCOUNTER (EMERGENCY)
Age: 63
Discharge: OTHER INSTITUTION | End: 2023-02-24
Attending: STUDENT IN AN ORGANIZED HEALTH CARE EDUCATION/TRAINING PROGRAM
Payer: MEDICAID

## 2023-02-24 ENCOUNTER — APPOINTMENT (OUTPATIENT)
Dept: CT IMAGING | Age: 63
End: 2023-02-24
Attending: STUDENT IN AN ORGANIZED HEALTH CARE EDUCATION/TRAINING PROGRAM
Payer: MEDICAID

## 2023-02-24 VITALS
OXYGEN SATURATION: 95 % | WEIGHT: 147.71 LBS | TEMPERATURE: 97.9 F | SYSTOLIC BLOOD PRESSURE: 93 MMHG | BODY MASS INDEX: 25.22 KG/M2 | HEIGHT: 64 IN | HEART RATE: 88 BPM | RESPIRATION RATE: 22 BRPM | DIASTOLIC BLOOD PRESSURE: 70 MMHG

## 2023-02-24 DIAGNOSIS — S31.000A WOUND OF SACRAL REGION, INITIAL ENCOUNTER: Primary | ICD-10-CM

## 2023-02-24 DIAGNOSIS — G80.9 CEREBRAL PALSY, UNSPECIFIED TYPE (HCC): ICD-10-CM

## 2023-02-24 LAB
ALBUMIN SERPL-MCNC: 1.4 G/DL (ref 3.5–5)
ALBUMIN/GLOB SERPL: 0.3 (ref 1.1–2.2)
ALP SERPL-CCNC: 69 U/L (ref 45–117)
ALT SERPL-CCNC: 16 U/L (ref 12–78)
ANION GAP SERPL CALC-SCNC: 8 MMOL/L (ref 5–15)
AST SERPL-CCNC: 12 U/L (ref 15–37)
BASOPHILS # BLD: 0.1 K/UL (ref 0–0.1)
BASOPHILS NFR BLD: 1 % (ref 0–1)
BILIRUB SERPL-MCNC: 0.2 MG/DL (ref 0.2–1)
BUN SERPL-MCNC: 8 MG/DL (ref 6–20)
BUN/CREAT SERPL: 10 (ref 12–20)
CALCIUM SERPL-MCNC: 8.2 MG/DL (ref 8.5–10.1)
CHLORIDE SERPL-SCNC: 93 MMOL/L (ref 97–108)
CO2 SERPL-SCNC: 30 MMOL/L (ref 21–32)
CREAT SERPL-MCNC: 0.78 MG/DL (ref 0.7–1.3)
CRP SERPL-MCNC: 19.86 MG/DL (ref 0–0.6)
DIFFERENTIAL METHOD BLD: ABNORMAL
EOSINOPHIL # BLD: 0 K/UL (ref 0–0.4)
EOSINOPHIL NFR BLD: 0 % (ref 0–7)
ERYTHROCYTE [DISTWIDTH] IN BLOOD BY AUTOMATED COUNT: 13.4 % (ref 11.5–14.5)
ERYTHROCYTE [SEDIMENTATION RATE] IN BLOOD: 127 MM/HR (ref 0–20)
GLOBULIN SER CALC-MCNC: 5.6 G/DL (ref 2–4)
GLUCOSE SERPL-MCNC: 215 MG/DL (ref 65–100)
HCT VFR BLD AUTO: 30.1 % (ref 36.6–50.3)
HGB BLD-MCNC: 9.4 G/DL (ref 12.1–17)
IMM GRANULOCYTES # BLD AUTO: 0.1 K/UL (ref 0–0.04)
IMM GRANULOCYTES NFR BLD AUTO: 1 % (ref 0–0.5)
LYMPHOCYTES # BLD: 0.5 K/UL (ref 0.8–3.5)
LYMPHOCYTES NFR BLD: 7 % (ref 12–49)
MCH RBC QN AUTO: 30.4 PG (ref 26–34)
MCHC RBC AUTO-ENTMCNC: 31.2 G/DL (ref 30–36.5)
MCV RBC AUTO: 97.4 FL (ref 80–99)
MONOCYTES # BLD: 1.2 K/UL (ref 0–1)
MONOCYTES NFR BLD: 17 % (ref 5–13)
NEUTS SEG # BLD: 4.9 K/UL (ref 1.8–8)
NEUTS SEG NFR BLD: 74 % (ref 32–75)
NRBC # BLD: 0 K/UL (ref 0–0.01)
NRBC BLD-RTO: 0 PER 100 WBC
PLATELET # BLD AUTO: 340 K/UL (ref 150–400)
PMV BLD AUTO: 8.9 FL (ref 8.9–12.9)
POTASSIUM SERPL-SCNC: 3.3 MMOL/L (ref 3.5–5.1)
PROT SERPL-MCNC: 7 G/DL (ref 6.4–8.2)
RBC # BLD AUTO: 3.09 M/UL (ref 4.1–5.7)
RBC MORPH BLD: ABNORMAL
SODIUM SERPL-SCNC: 131 MMOL/L (ref 136–145)
WBC # BLD AUTO: 6.8 K/UL (ref 4.1–11.1)

## 2023-02-24 PROCEDURE — 74011000636 HC RX REV CODE- 636: Performed by: STUDENT IN AN ORGANIZED HEALTH CARE EDUCATION/TRAINING PROGRAM

## 2023-02-24 PROCEDURE — 36415 COLL VENOUS BLD VENIPUNCTURE: CPT

## 2023-02-24 PROCEDURE — 80053 COMPREHEN METABOLIC PANEL: CPT

## 2023-02-24 PROCEDURE — 86140 C-REACTIVE PROTEIN: CPT

## 2023-02-24 PROCEDURE — 99285 EMERGENCY DEPT VISIT HI MDM: CPT

## 2023-02-24 PROCEDURE — 85025 COMPLETE CBC W/AUTO DIFF WBC: CPT

## 2023-02-24 PROCEDURE — 72193 CT PELVIS W/DYE: CPT

## 2023-02-24 PROCEDURE — 85652 RBC SED RATE AUTOMATED: CPT

## 2023-02-24 RX ADMIN — IOPAMIDOL 100 ML: 755 INJECTION, SOLUTION INTRAVENOUS at 10:48

## 2023-02-24 NOTE — ED TRIAGE NOTES
Pt assisted to treatment area via wheelchair his caregiver states that since his stay at [facility]  (2/1/23-2/6/23) he has 2 wounds to his bottom. One at the top mid buttocks and on at the [facility]". Today while sitting on the toilet the wound at the \"crack\" began to bleed so the group home sent him to be evaluated.

## 2023-02-24 NOTE — ED NOTES
TRANSFER - OUT REPORT:    Verbal report given to Adam Shore with [facility](name) on Jcarlos Lubin  being transferred to [address] [place] [address] ER(unit) for routine progression of care       Report consisted of patients Situation, Background, Assessment and   Recommendations(SBAR). Information from the following report(s) SBAR, ED Summary, Intake/Output, MAR, and Recent Results was reviewed with the receiving nurse. Lines:   Peripheral IV 02/24/23 Left Antecubital (Active)   Site Assessment Clean, dry, & intact 02/24/23 1009   Phlebitis Assessment 0 02/24/23 1009   Infiltration Assessment 0 02/24/23 1009   Dressing Status Clean, dry, & intact 02/24/23 1009   Dressing Type Transparent 02/24/23 1009   Hub Color/Line Status Pink 02/24/23 1009   Action Taken Blood drawn 02/24/23 1009        Opportunity for questions and clarification was provided. Patient transported with:   ADITI VASQUEZS    Caregiver taking pt's clothing and wheelchair.

## 2023-02-24 NOTE — ED PROVIDER NOTES
Patient is a 49-year-old male history of cerebral palsy, nonverbal at baseline, diabetes, psoriasis, seizures presenting today with wounds to his sacral/buttocks region. He was admitted to [facility] earlier this month and was discharged with these wounds on his buttocks which is progressively worsened. Today there is been drainage noted from one of them, thought to be bloody by group home staff. They have tried padded dressings but the wounds are getting worse. They seem to make the patient uncomfortable and he has had a decline in his behavior over the past several days to weeks. Staff reports that he has had constant leakage of stool from his rectum. No fevers. Full history, physical exam, and ROS unable to be obtained due to:  non-verbal           Past Medical History:   Diagnosis Date    Cerebral palsy ([facility]     CP    Developmental delay     Diabetes ([facility]     Hypothyroidism     Mental retardation     Mild    Psoriasis     Psychiatric disorder     Seizures ([facility]        No past surgical history on file.       Family History:   Family history unknown: Yes       Social History     Socioeconomic History    Marital status: SINGLE     Spouse name: Not on file    Number of children: Not on file    Years of education: Not on file    Highest education level: Not on file   Occupational History    Not on file   Tobacco Use    Smoking status: Never    Smokeless tobacco: Never   Substance and Sexual Activity    Alcohol use: No    Drug use: Yes     Types: Prescription    Sexual activity: Not on file   Other Topics Concern    Not on file   Social History Narrative    Not on file     Social Determinants of Health     Financial Resource Strain: Not on file   Food Insecurity: Not on file   Transportation Needs: Not on file   Physical Activity: Not on file   Stress: Not on file   Social Connections: Not on file   Intimate Partner Violence: Not on file   Housing Stability: Not on file         ALLERGIES: Patient has no known allergies. Review of Systems   Unable to perform ROS: Patient nonverbal     Vitals:    02/24/23 0936   BP: 135/65   Pulse: 88   Resp: 17   Temp: 96.9 °F (36.1 °C)   SpO2: 97%   Weight: 67 kg (147 lb 11.3 oz)   Height: 5' 4\" (1.626 m)            Physical Exam  Vitals and nursing note reviewed. Constitutional:       General: He is not in acute distress. Appearance: He is well-developed. HENT:      Head: Normocephalic and atraumatic. Eyes:      Conjunctiva/sclera: Conjunctivae normal.      Pupils: Pupils are equal, round, and reactive to light. Cardiovascular:      Rate and Rhythm: Normal rate and regular rhythm. Heart sounds: Normal heart sounds. No murmur heard. No friction rub. No gallop. Comments: Equal radial, dp, and pt pulses  Pulmonary:      Effort: Pulmonary effort is normal. No respiratory distress. Breath sounds: Normal breath sounds. No wheezing or rales. Abdominal:      General: Bowel sounds are normal. There is no distension. Palpations: Abdomen is soft. Tenderness: There is no abdominal tenderness. There is no guarding or rebound. Musculoskeletal:      Cervical back: Normal range of motion and neck supple. Comments: Contractures noted c/w hx of CP   Skin:     General: Skin is warm and dry. Capillary Refill: Capillary refill takes less than 2 seconds. Comments: Extensive blanchable erythema to the sacral region and upper buttocks c/w stage 1 decub, 3cm round stage 3 to the L buttocks. At the cephalad aspect of gluteal cleft noted to have what I suspect is fistulization   Neurological:      Mental Status: He is alert.       Comments: Awake and alert        Medical Decision Making  Work-up:  Anemic without leukocytosis  Markedly elevated CRP and ESR  Mild hypokalemia  Mild hyponatremia  Hyperglycemia    CT shows no osteomyelitis or drainable abscess    I discussed with ED physician at [facility], [facility] hospital of admission, who accepts patient in transfer      The patient is a 64-year-old male history of cerebral palsy who is nonverbal coming from a group home with sacral decubitus wounds that have been present for about 3 weeks now. He is afebrile, no leukocytosis and stable vital signs therefore do not feel he is septic. CT showed no obvious osteomyelitis however he does have elevated ESR/CRP. I am concerned about follow-up and ability for the group home to manage these wounds at their facility. I feel that the patient would be best served if he could be evaluated by wound care. I see no indication for antibiotics at this time. Patient to be transferred to [facility] per request.    Problems Addressed:  Cerebral palsy, unspecified type [facility]): chronic illness or injury  Wound of sacral region, initial encounter: acute illness or injury    Amount and/or Complexity of Data Reviewed  Labs: ordered. Radiology: ordered. Risk  Prescription drug management.            Procedures

## 2023-02-24 NOTE — ED NOTES
TRANSFER - OUT REPORT:    Telephone Verbal report given to Marita Figueroa RN(name) on Edelmira Verdugo  being transferred to [facility] ER (unit) for routine progression of care       Report consisted of patients Situation, Background, Assessment and   Recommendations(SBAR). Information from the following report(s) SBAR, ED Summary, Intake/Output, and Recent Results was reviewed with the receiving nurse. Lines:   Peripheral IV 02/24/23 Left Antecubital (Active)   Site Assessment Clean, dry, & intact 02/24/23 1009   Phlebitis Assessment 0 02/24/23 1009   Infiltration Assessment 0 02/24/23 1009   Dressing Status Clean, dry, & intact 02/24/23 1009   Dressing Type Transparent 02/24/23 1009   Hub Color/Line Status Pink 02/24/23 1009   Action Taken Blood drawn 02/24/23 1009        Opportunity for questions and clarification was provided.       Patient transported with:   ADITI LOPEZ

## 2023-03-22 ENCOUNTER — TELEPHONE (OUTPATIENT)
Dept: UROLOGY | Age: 63
End: 2023-03-22

## 2023-03-23 NOTE — TELEPHONE ENCOUNTER
Spoke with yudi at [facility] and gave appt date and time.  Provided address as well

## 2023-03-24 PROBLEM — N28.1 RENAL CYST: Status: ACTIVE | Noted: 2023-03-24

## 2023-04-20 ENCOUNTER — OFFICE VISIT (OUTPATIENT)
Dept: UROLOGY | Age: 63
End: 2023-04-20
Payer: MEDICAID

## 2023-04-20 VITALS
SYSTOLIC BLOOD PRESSURE: 101 MMHG | DIASTOLIC BLOOD PRESSURE: 72 MMHG | BODY MASS INDEX: 24.75 KG/M2 | HEART RATE: 101 BPM | WEIGHT: 145 LBS | TEMPERATURE: 97.7 F | HEIGHT: 64 IN

## 2023-04-20 DIAGNOSIS — F79 INTELLECTUAL DISABILITY: ICD-10-CM

## 2023-04-20 DIAGNOSIS — N28.1 RENAL CYST: Primary | ICD-10-CM

## 2023-04-20 PROCEDURE — 99203 OFFICE O/P NEW LOW 30 MIN: CPT | Performed by: UROLOGY

## 2023-04-20 RX ORDER — HYDROCORTISONE 25 MG/G
CREAM TOPICAL
COMMUNITY
Start: 2023-02-23

## 2023-04-20 RX ORDER — PSEUDOEPH/DM/GUAIFEN/ACETAMIN 30-10-324
EXPECTORANT ORAL
COMMUNITY
Start: 2023-02-23

## 2023-04-20 RX ORDER — ETANERCEPT 50 MG/ML
SOLUTION SUBCUTANEOUS
COMMUNITY
Start: 2023-02-16

## 2023-04-20 RX ORDER — TIZANIDINE 2 MG/1
TABLET ORAL
COMMUNITY
Start: 2023-02-01

## 2023-04-20 RX ORDER — ASPIRIN 81 MG
TABLET, DELAYED RELEASE (ENTERIC COATED) ORAL
COMMUNITY
Start: 2023-02-01

## 2023-04-20 RX ORDER — AMMONIUM LACTATE 12 G/100G
LOTION TOPICAL
COMMUNITY
Start: 2023-02-04

## 2023-04-20 RX ORDER — SENNOSIDES 8.6 MG
TABLET ORAL
COMMUNITY
Start: 2023-03-01

## 2023-04-20 RX ORDER — ASPIRIN 81 MG/1
TABLET ORAL
COMMUNITY
Start: 2023-03-01

## 2023-04-20 RX ORDER — BUDESONIDE 3 MG/1
CAPSULE, COATED PELLETS ORAL
COMMUNITY
Start: 2023-03-01

## 2023-04-20 RX ORDER — POLYETHYLENE GLYCOL 3350 17 G/17G
POWDER, FOR SOLUTION ORAL
COMMUNITY
Start: 2023-02-02

## 2023-04-20 RX ORDER — CALCIUM CITRATE/VITAMIN D3 200MG-6.25
TABLET ORAL
COMMUNITY
Start: 2023-02-02

## 2023-04-20 RX ORDER — FESOTERODINE FUMARATE 8 MG/1
TABLET, EXTENDED RELEASE ORAL
COMMUNITY
Start: 2023-01-24

## 2023-04-20 RX ORDER — SIMVASTATIN 20 MG/1
TABLET, FILM COATED ORAL
COMMUNITY
Start: 2023-03-01

## 2023-04-20 RX ORDER — GLUCOSAM/CHON-MSM1/C/MANG/BOSW 500-416.6
TABLET ORAL
COMMUNITY
Start: 2023-02-02

## 2023-04-20 NOTE — PROGRESS NOTES
HISTORY OF PRESENT ILLNESS    Mica Cerda is a 57 y.o. male is a new patient is here with cc of renal cyst, with history of cerebral palsy, nonverbal at baseline, diabetes, psoriasis, seizures   He was admitted to the hospital with  abdominal pain and diarrhea. He has wounds on his buttocks    Ct scan from 1/2023 revealed  No hydronephrosis. Benign bilateral renal cysts requiring no follow-up,  the largest arising from the lower pole of the left kidney measuring 6 cm. Is asymptomatic of the renal cyst.  These cyst are stable there is no sign of cancer in either the cyst.  There is no real reason to follow the cyst unless they becomes symptomatic gross hematuria etc.  I reviewed the CT scan from 2022 and 2023 and looked at the images         Past Medical History:  PMHx (including negatives):  has a past medical history of Cerebral palsy ([facility] Developmental delay, Diabetes ([facility] Hypothyroidism, Mental retardation, Psoriasis, Psychiatric disorder, and Seizures ([address] PSurgHx:  has no past surgical history on file. PSocHx:  reports that he has never smoked. He has never used smokeless tobacco. He reports current drug use. Drug: Prescription. He reports that he does not drink alcohol. Home Medications    Medication Sig Start Date End Date Taking? Authorizing Provider   ammonium lactate (LAC-HYDRIN) 12 % lotion  2/4/23  Yes Provider, Historical   aspirin delayed-release 81 mg tablet  3/1/23  Yes Provider, Historical   budesonide (ENTOCORT EC) 3 mg capsule  3/1/23  Yes Provider, Historical   hydrocortisone (HYTONE) 2.5 % topical cream  2/23/23  Yes Provider, Historical   lidocaine HCL 1 % lotn Apply  to affected area.    Yes Provider, Historical   Thera-M 27-0.4 mg tab  2/1/23  Yes Provider, Historical   Triple Antibiotic 3.5mg-400 unit- 5,000 unit/gram ointment  2/23/23  Yes Provider, Historical   Senna 8.6 mg tablet  3/1/23  Yes Provider, Historical   EnbreL SureClick 50 mg/mL (1 mL) injection  2/16/23 Yes Provider, Historical   fesoterodine (TOVIAZ) 8 mg ER tablet  1/24/23  Yes Provider, Historical   Gavilax 17 gram/dose powder  2/2/23  Yes Provider, Historical   tiZANidine (ZANAFLEX) 2 mg tablet  2/1/23  Yes Provider, Historical   ketoconazole (NIZORAL) 2 % shampoo Apply  to affected area daily as needed for Itching. Yes Other, MD Pramod   TEGretol  mg SR tablet TAKE ONE TABLET TWICE A DAY *BRAND PREF* 10/12/22  Yes Miladys Dixon NP   Depakote  mg ER tablet TAKE THREE (3) TABLETS DAILY *BRAND MEDICALLY NECESSARY* 7/12/22  Yes De Albert MD   tamsulosin (FLOMAX) 0.4 mg capsule Take 1 Capsule by mouth daily. Yes Other, MD Pramod   cetaphil (CETAPHIL) topical cream Apply  to affected area as needed for Dry Skin. Yes Other, MD Pramod   clobetasol (CLOBEX) 0.05 % lotion Apply  to affected area two (2) times a day. Yes Provider, Historical   cholecalciferol (VITAMIN D3) 400 unit tab tablet Take  by mouth daily. Yes Provider, Historical   loratadine (CLARITIN) 10 mg tablet Take 1 Tablet by mouth daily. Yes Provider, Historical   fesoterodine (TOVIAZ) 4 mg SR tablet Take 2 Tablets by mouth daily. Yes Provider, Historical   ferrous sulfate 325 mg (65 mg iron) tablet Take  by mouth Daily (before breakfast). Yes Provider, Historical   risperiDONE (RisperDAL) 2 mg tablet Take 1 Tablet by mouth daily. Yes Provider, Historical   levothyroxine (SYNTHROID) 75 mcg tablet Take  by mouth Daily (before breakfast). Yes Provider, Historical   lisinopril (PRINIVIL, ZESTRIL) 5 mg tablet Take  by mouth daily. Yes Provider, Historical   therapeutic multivitamin (THERAGRAN) tablet Take 1 Tablet by mouth daily. Yes Provider, Historical   fluvoxaMINE (LUVOX) 50 mg tablet Take  by mouth two (2) times a day. Yes Provider, Historical   etanercept 50 mg/mL (1 mL) injection by SubCUTAneous route.      Yes Provider, Historical   True Metrix Glucose Test Strip strip  2/2/23   Provider, Historical   TRUEplus Lancets 30 gauge misc  2/2/23   Provider, Historical   simvastatin (ZOCOR) 20 mg tablet  3/1/23   Provider, Historical   risperiDONE (RisperDAL) 4 mg tablet Take 0.5 Tablets by mouth daily (after dinner). Other, MD Pramod   OTHER True Track smart  System  Twice weekly ( check blood glucose)    Provider, Historical   polyethylene glycol (MIRALAX) 17 gram packet Take 17 g by mouth daily. Patient not taking: Reported on 4/20/2023    Provider, Historical   simvastatin (ZOCOR) 40 mg tablet Take 0.5 Tablets by mouth nightly. Provider, Historical        Chronic Conditions Addressed Today       1. Intellectual disability     Relevant Medications     aspirin delayed-release 81 mg tablet     tiZANidine (ZANAFLEX) 2 mg tablet    2. Renal cyst - Primary     Overview      CT 12/1/22: left-sided renal cysts measuring up to 5.6 cm. CT 1/25/23: benign bilateral renal cysts, the largest arising from the lower pole of the left kidney measuring 6 cm. ROS  Patient denies the symptoms of COVID-19 per routine screening guidelines. Unable to communicate  Physical Exam  Vitals and nursing note reviewed. Constitutional:       General: He is not in acute distress. Appearance: He is well-developed. HENT:      Head: Normocephalic and atraumatic. Eyes:      Conjunctiva/sclera: Conjunctivae normal.      Pupils: Pupils are equal, round, and reactive to light. Cardiovascular:      Rate and Rhythm: Normal rate and regular rhythm. Heart sounds: Normal heart sounds. No murmur heard. No friction rub. No gallop. Comments: Equal radial, dp, and pt pulses  Pulmonary:      Effort: Pulmonary effort is normal. No respiratory distress. Breath sounds: Normal breath sounds. No wheezing or rales. Abdominal:      General: Bowel sounds are normal. There is no distension. Palpations: Abdomen is soft. Tenderness: There is no abdominal tenderness. There is no guarding or rebound. Musculoskeletal:      Cervical back: Normal range of motion and neck supple. Comments: Contractures noted c/w hx of CP   Skin:     General: Skin is warm and dry. Capillary Refill: Capillary refill takes less than 2 seconds. Comments: Extensive blanchable erythema to the sacral region and upper buttocks c/w stage 1 decub, 3cm round stage 3 to the L buttocks. At the cephalad aspect of gluteal cleft noted to have what I suspect is fistulization   Neurological:      Mental Status: He is alert. Comments: Awake and alert   ASSESSMENT and PLAN  Diagnoses and all orders for this visit:    1. Renal cyst    2. Intellectual disability         This point no reason to treat this cyst will follow as needed        Elizabeth Pate may have a reminder for a \"due or due soon\" health maintenance. The patient has been encouraged to contact their primary care provider for follow-up on this health maintenance or other necessary and/or routine health screening.      Miriam Yoo MD

## 2023-04-20 NOTE — PROGRESS NOTES
Chief Complaint   Patient presents with    New Patient    Renal Cyst       PHQ-9 score is    Negative    Vitals:    04/20/23 1316   BP: 101/72   Pulse: (!) 101   Temp: 97.7 °F (36.5 °C)   Weight: 145 lb (65.8 kg)   Height: 5' 4\" (1.626 m)        1. \"Have you been to the ER, urgent care clinic since your last visit? Hospitalized since your last visit? \" No    2. \"Have you seen or consulted any other health care providers outside of the [address] [place] [address] since your last visit? \" No     3. For patients aged 39-70: Has the patient had a colonoscopy / FIT/ Cologuard? No      If the patient is female:    4. For patients aged 41-77: Has the patient had a mammogram within the past 2 years? NA - based on age or sex      11. For patients aged 21-65: Has the patient had a pap smear?  NA - based on age or sex

## 2023-04-24 ENCOUNTER — TELEPHONE (OUTPATIENT)
Dept: NEUROLOGY | Age: 63
End: 2023-04-24

## 2023-04-24 DIAGNOSIS — G40.209 PARTIAL EPILEPSY WITH IMPAIRMENT OF CONSCIOUSNESS (HCC): ICD-10-CM

## 2023-04-26 NOTE — TELEPHONE ENCOUNTER
I do not see any documentation it is forced necessary   But it looks like its been branded for quite some time. Can they run it as branded and keep it?     Otherwise go generuic

## 2023-04-28 ENCOUNTER — TELEPHONE (OUTPATIENT)
Dept: NEUROLOGY | Age: 63
End: 2023-04-28

## 2023-04-28 DIAGNOSIS — G40.209 PARTIAL EPILEPSY WITH IMPAIRMENT OF CONSCIOUSNESS (HCC): ICD-10-CM

## 2023-04-28 RX ORDER — DIVALPROEX SODIUM 500 MG/1
TABLET, EXTENDED RELEASE ORAL
Qty: 93 TABLET | Refills: 11 | Status: SHIPPED | OUTPATIENT
Start: 2023-04-28 | End: 2023-05-01 | Stop reason: ALTCHOICE

## 2023-04-28 RX ORDER — DIVALPROEX SODIUM 500 MG/1
TABLET, EXTENDED RELEASE ORAL
Qty: 93 TABLET | Refills: 11 | Status: SHIPPED | OUTPATIENT
Start: 2023-04-28 | End: 2023-04-28 | Stop reason: SDUPTHER

## 2023-05-01 RX ORDER — DIVALPROEX SODIUM 500 MG/1
500 TABLET, DELAYED RELEASE ORAL 3 TIMES DAILY
Qty: 93 TABLET | Refills: 5 | Status: SHIPPED | OUTPATIENT
Start: 2023-05-01

## 2023-05-01 NOTE — TELEPHONE ENCOUNTER
Please call [address] regarding patient's Divalproex DR. She stated the dosage is different from the ER. She stated the DR should say 1 tab 3x daily VS the ER that states 3 tabs by mouth daily.

## 2023-05-01 NOTE — TELEPHONE ENCOUNTER
Calling in regards to Depakote rx. States we keep sending rx to dispense as written, however the are saying Depakote is not out on the market shahzad, needs ok to dispense generic and/or needs rx for generic. Pt is out of medication.

## 2023-07-11 ENCOUNTER — OFFICE VISIT (OUTPATIENT)
Age: 63
End: 2023-07-11
Payer: COMMERCIAL

## 2023-07-11 VITALS — HEART RATE: 95 BPM | OXYGEN SATURATION: 96 % | DIASTOLIC BLOOD PRESSURE: 82 MMHG | SYSTOLIC BLOOD PRESSURE: 120 MMHG

## 2023-07-11 DIAGNOSIS — G93.49 STATIC ENCEPHALOPATHY: ICD-10-CM

## 2023-07-11 DIAGNOSIS — G40.209 LOCALIZATION-RELATED (FOCAL) (PARTIAL) SYMPTOMATIC EPILEPSY AND EPILEPTIC SYNDROMES WITH COMPLEX PARTIAL SEIZURES, NOT INTRACTABLE, WITHOUT STATUS EPILEPTICUS (HCC): Primary | ICD-10-CM

## 2023-07-11 PROCEDURE — 1123F ACP DISCUSS/DSCN MKR DOCD: CPT | Performed by: PSYCHIATRY & NEUROLOGY

## 2023-07-11 PROCEDURE — 99214 OFFICE O/P EST MOD 30 MIN: CPT | Performed by: PSYCHIATRY & NEUROLOGY

## 2023-07-11 RX ORDER — CARBAMAZEPINE 200 MG/1
400 TABLET, EXTENDED RELEASE ORAL 2 TIMES DAILY
Qty: 60 TABLET | Refills: 11 | Status: SHIPPED | OUTPATIENT
Start: 2023-07-11 | End: 2023-07-11 | Stop reason: SDUPTHER

## 2023-07-11 RX ORDER — MESALAMINE 800 MG/1
800 TABLET, DELAYED RELEASE ORAL
COMMUNITY

## 2023-07-11 RX ORDER — DIVALPROEX SODIUM 500 MG/1
1500 TABLET, EXTENDED RELEASE ORAL DAILY
Qty: 90 TABLET | Refills: 11 | Status: SHIPPED | OUTPATIENT
Start: 2023-07-11

## 2023-07-11 RX ORDER — CARBAMAZEPINE 200 MG/1
200 TABLET, EXTENDED RELEASE ORAL 2 TIMES DAILY
Qty: 60 TABLET | Refills: 11 | Status: SHIPPED | OUTPATIENT
Start: 2023-07-11

## 2023-07-11 RX ORDER — DIVALPROEX SODIUM 125 MG/1
125 TABLET, DELAYED RELEASE ORAL 2 TIMES DAILY
COMMUNITY
End: 2023-07-11

## 2023-07-11 RX ORDER — BUDESONIDE 9 MG/1
1 TABLET, FILM COATED, EXTENDED RELEASE ORAL DAILY
COMMUNITY

## 2023-07-11 RX ORDER — DIVALPROEX SODIUM 500 MG/1
TABLET, EXTENDED RELEASE ORAL
Qty: 30 TABLET | Refills: 11 | Status: SHIPPED | OUTPATIENT
Start: 2023-07-11 | End: 2023-07-11

## 2023-07-11 NOTE — PROGRESS NOTES
[facility] and [address] at [facility] at [address] [place] [address], [address] [place] [address]   [phone]              Chief Complaint   Patient presents with    Seizures    Follow-up     Current Outpatient Medications   Medication Sig Dispense Refill    divalproex (DEPAKOTE) 125 MG DR tablet Take 1 tablet by mouth in the morning and at bedtime Indications: Manic-Depression      mesalamine (DELZICOL) 800 MG TBEC TBEC tablet Take 1 tablet by mouth 3 times daily (with meals)      Budesonide ER 9 MG TB24 Take 1 tablet by mouth daily      carBAMazepine (TEGRETOL XR) 200 MG extended release tablet Take 2 tablets by mouth 2 times daily      vitamin D3 (CHOLECALCIFEROL) 10 MCG (400 UNIT) TABS tablet Take by mouth daily      clobetasol propionate 0.05 % LOTN lotion Apply topically 2 times daily      divalproex (DEPAKOTE ER) 500 MG extended release tablet TAKE THREE (3) TABLETS DAILY *BRAND MEDICALLY NECESSARY*      etanercept (ENBREL) 50 MG/ML injection Inject into the skin      ferrous sulfate (IRON 325) 325 (65 Fe) MG tablet Take by mouth every morning (before breakfast)      Fesoterodine Fumarate ER 8 MG TB24 Take 8 mg by mouth nightly      fluvoxaMINE (LUVOX) 50 MG tablet Take by mouth 2 times daily      hydrocortisone (WESTCORT) 0.2 % cream Apply topically 2 times daily      ketoconazole (NIZORAL) 2 % shampoo Apply topically daily as needed      levothyroxine (SYNTHROID) 100 MCG tablet Take 1 tablet by mouth every morning (before breakfast)      loratadine (CLARITIN) 10 MG tablet Take 1 tablet by mouth daily      polyethylene glycol (GLYCOLAX) 17 GM/SCOOP powder Take 17 g by mouth daily      risperiDONE (RISPERDAL) 2 MG tablet Take 1 tablet by mouth daily      risperiDONE (RISPERDAL M-TABS) 4 MG disintegrating tablet Take 1 tablet by mouth nightly      simvastatin (ZOCOR) 20 MG

## 2023-07-11 NOTE — PROGRESS NOTES
No seizures or activity  Needs refills   Pt with group home rep, rep does not have much info.  No other quetions

## 2023-07-11 NOTE — PROGRESS NOTES
S/w Megan with [facility] pharmacy, medication list brought in from group home was incorrect. Pt takes BRAND tegretol  mg BID, not 400 mg BID. Pt NOT taking divalproex 125 mg TID. Was taking divalproex er 500 (1500 mg daily) until there was shortage so dose was temporarily changed to  TID. Since they have divalproex ER back in stock, sent how he was taking previously.     Parker Blackwell will call pt's home to make sure they do not bring in [facility] from another physician's office and only bring MAR from pharmacy list for the most accurate med list.

## 2023-07-11 NOTE — PROGRESS NOTES
Update, Megan with [facility] called back stating she s/w pt's group home and was told that pt has not been with them for a couple of months. Pt's caregiver placed him at [facility] [phone]. [facility] has been filling medications and delivering them the 1st of every month to pt's group home. The group home has not returned them back nor ever updated [facility] is not there. Called GERARDO Grissom. S/w nurse in charge of pt's floor.   She will have his doctor call me back to discuss any medication changes that pt has had with their facility as the person that accompanied pt to appt did not know anything about pt, just came in with forms/notes from facility

## 2023-10-31 ENCOUNTER — HOSPITAL ENCOUNTER (EMERGENCY)
Facility: HOSPITAL | Age: 63
Discharge: HOME | End: 2023-10-31
Attending: STUDENT IN AN ORGANIZED HEALTH CARE EDUCATION/TRAINING PROGRAM
Payer: MEDICAID

## 2023-10-31 VITALS
RESPIRATION RATE: 20 BRPM | DIASTOLIC BLOOD PRESSURE: 69 MMHG | SYSTOLIC BLOOD PRESSURE: 123 MMHG | HEIGHT: 64 IN | OXYGEN SATURATION: 97 % | TEMPERATURE: 97.2 F | BODY MASS INDEX: 24.77 KG/M2 | HEART RATE: 88 BPM | WEIGHT: 145.06 LBS

## 2023-10-31 DIAGNOSIS — L30.9 DERMATITIS: Primary | ICD-10-CM

## 2023-10-31 PROCEDURE — 6370000000 HC RX 637 (ALT 250 FOR IP): Performed by: STUDENT IN AN ORGANIZED HEALTH CARE EDUCATION/TRAINING PROGRAM

## 2023-10-31 PROCEDURE — 99283 EMERGENCY DEPT VISIT LOW MDM: CPT

## 2023-10-31 RX ORDER — LOPERAMIDE HYDROCHLORIDE 2 MG/1
2 CAPSULE ORAL ONCE
Status: COMPLETED | OUTPATIENT
Start: 2023-10-31 | End: 2023-10-31

## 2023-10-31 RX ADMIN — Medication: at 11:09

## 2023-10-31 RX ADMIN — LOPERAMIDE HYDROCHLORIDE 2 MG: 2 CAPSULE ORAL at 11:09

## 2023-10-31 ASSESSMENT — PAIN - FUNCTIONAL ASSESSMENT: PAIN_FUNCTIONAL_ASSESSMENT: ADULT NONVERBAL PAIN SCALE (NPVS)

## 2023-10-31 ASSESSMENT — PAIN SCALES - WONG BAKER: WONGBAKER_NUMERICALRESPONSE: 0

## 2023-10-31 NOTE — ED PROVIDER NOTES
amount of skin breakdown consistent with diaper dermatitis. Patient has had intermittent diarrhea for some time now, he did have a loose/watery light brown stool without any significant odor in the diaper at time of examination. He will be given a dose of Imodium here in the emergency department. Caregiver instructed on prevention of diaper dermatitis with frequent diaper changes to keep the diaper dry, use of barrier cream which will be applied here in the emergency department and prescription provided for use at facility. They were encouraged on follow-up with PCP and given ER return precautions. Caregiver verbally conveyed their understanding and agreement of the patient's signs, symptoms, diagnosis, treatment and prognosis and additionally agree to follow-up as recommended in the discharge instructions or to return to the Emergency Department should their condition change or worsen prior to their follow-up appointment. All questions have been answered and caregiver express understanding. Risk  OTC drugs. Prescription drug management. CONSULTS:  None      PROCEDURES:  Unless otherwise noted below, none     Procedures      FINAL IMPRESSION      1. Dermatitis          DISPOSITION/PLAN   DISPOSITION        PATIENT REFERRED TO:  No follow-up provider specified.     DISCHARGE MEDICATIONS:  New Prescriptions    ZINC OXIDE 40 % PSTE PASTE    Apply topically 4 times a day as needed for skin irritation/breakdown         (Please note that portions of this note were completed with a voice recognition program.  Efforts were made to edit the dictations but occasionally words are mis-transcribed.)    Eric Harden DO (electronically signed)  Emergency Attending Physician / Physician Assistant / Nurse Practitioner         Eric Harden DO  10/31/23 [id number]

## 2023-10-31 NOTE — ED TRIAGE NOTES
Pt brought the ED by staff that cares for him in a group home. Pt is long term resident for cognitive and physical disabilities. Pt alert and responding to staff. Follows directions. Pt is wheelchair bound. Buttocks is red with small open areas on both buttocks. Pt recently spent time in hospital for colitis.  (7 months)   Returned back home to group home in Sept.    Pt is with care companion from group [facility] Reynoso)

## 2023-10-31 NOTE — ED NOTES
Pt discharged in care of provider Robby West. Instructions given on importance of keeping skin dry and changing briefs. Encouraged to keep of bottom. Explained on medications provided. Pt provided new T-shirt to go home in due to incontinence. Pt taken to car in patient's wheelchair. Advised that he does have small open area on each buttock and to keep eye on it for changes. Care companion voiced understanding.      Brad Gamble RN  10/31/23 2097

## 2024-01-19 ENCOUNTER — TELEPHONE (OUTPATIENT)
Facility: CLINIC | Age: 64
End: 2024-01-19

## 2024-01-19 NOTE — TELEPHONE ENCOUNTER
Attempt has been made to contact the patient, provider isn't excepting any new patients at this time.

## 2024-08-27 ENCOUNTER — OFFICE VISIT (OUTPATIENT)
Age: 64
End: 2024-08-27
Payer: MEDICAID

## 2024-08-27 VITALS
SYSTOLIC BLOOD PRESSURE: 98 MMHG | RESPIRATION RATE: 14 BRPM | DIASTOLIC BLOOD PRESSURE: 62 MMHG | OXYGEN SATURATION: 98 % | HEART RATE: 76 BPM

## 2024-08-27 DIAGNOSIS — G93.49 STATIC ENCEPHALOPATHY: ICD-10-CM

## 2024-08-27 DIAGNOSIS — G40.209 LOCALIZATION-RELATED (FOCAL) (PARTIAL) SYMPTOMATIC EPILEPSY AND EPILEPTIC SYNDROMES WITH COMPLEX PARTIAL SEIZURES, NOT INTRACTABLE, WITHOUT STATUS EPILEPTICUS (HCC): Primary | ICD-10-CM

## 2024-08-27 DIAGNOSIS — G81.91 RIGHT HEMIPARESIS (HCC): ICD-10-CM

## 2024-08-27 PROCEDURE — 1123F ACP DISCUSS/DSCN MKR DOCD: CPT | Performed by: PSYCHIATRY & NEUROLOGY

## 2024-08-27 PROCEDURE — 99214 OFFICE O/P EST MOD 30 MIN: CPT | Performed by: PSYCHIATRY & NEUROLOGY

## 2024-08-27 RX ORDER — MULTIVIT-MIN/FERROUS GLUCONATE 9 MG/15 ML
15 LIQUID (ML) ORAL DAILY
COMMUNITY

## 2024-08-27 RX ORDER — TRIAMCINOLONE ACETONIDE 1 MG/G
OINTMENT TOPICAL 2 TIMES DAILY
COMMUNITY

## 2024-08-27 RX ORDER — DIVALPROEX SODIUM 500 MG/1
1500 TABLET, EXTENDED RELEASE ORAL DAILY
Qty: 90 TABLET | Refills: 11 | Status: CANCELLED | OUTPATIENT
Start: 2024-08-27

## 2024-08-27 RX ORDER — MESALAMINE 1.2 G/1
2400 TABLET, DELAYED RELEASE ORAL 2 TIMES DAILY
COMMUNITY

## 2024-08-27 RX ORDER — CARBAMAZEPINE 200 MG/1
200 TABLET, EXTENDED RELEASE ORAL 2 TIMES DAILY
Qty: 60 TABLET | Refills: 11 | Status: CANCELLED | OUTPATIENT
Start: 2024-08-27

## 2024-08-27 RX ORDER — ACETAMINOPHEN 325 MG/1
650 TABLET ORAL EVERY 6 HOURS PRN
COMMUNITY

## 2024-08-27 NOTE — PROGRESS NOTES
[facility] and [facility] at [facility] at [address] [place] [address] [address] [facility] [facility] [address] [place] [address] [phone]              Chief Complaint   Patient presents with    Seizures     Stable on  Depakote and Tegretol // [facility]     Static encephalopathy stable     Current Outpatient Medications   Medication Sig Dispense Refill    zinc oxide 40 % PSTE paste Apply topically 4 times a day as needed for skin irritation/breakdown 28 g 2    mesalamine (DELZICOL) 800 MG TBEC TBEC tablet Take 1 tablet by mouth 3 times daily (with meals)      Budesonide ER 9 MG TB24 Take 1 tablet by mouth daily      carBAMazepine (TEGRETOL XR) 200 MG extended release tablet Take 1 tablet by mouth 2 times daily Brand only medically necessary 60 tablet 11    divalproex (DEPAKOTE ER) 500 MG extended release tablet Take 3 tablets by mouth daily 90 tablet 11    vitamin D3 (CHOLECALCIFEROL) 10 MCG (400 UNIT) TABS tablet Take by mouth daily      clobetasol propionate 0.05 % LOTN lotion Apply topically 2 times daily      etanercept (ENBREL) 50 MG/ML injection Inject into the skin      ferrous sulfate (IRON 325) 325 (65 Fe) MG tablet Take by mouth every morning (before breakfast)      Fesoterodine Fumarate ER 8 MG TB24 Take 8 mg by mouth nightly      fluvoxaMINE (LUVOX) 50 MG tablet Take by mouth 2 times daily      hydrocortisone (WESTCORT) 0.2 % cream Apply topically 2 times daily      ketoconazole (NIZORAL) 2 % shampoo Apply topically daily as needed      levothyroxine (SYNTHROID) 100 MCG tablet Take 1 tablet by mouth every morning (before breakfast)      loratadine (CLARITIN) 10 MG tablet Take 1 tablet by mouth daily      polyethylene glycol (GLYCOLAX) 17 GM/SCOOP powder Take 17 g by mouth daily      risperiDONE (RISPERDAL) 2 MG tablet Take 1 tablet by mouth daily      risperiDONE (RISPERDAL M-TABS) 4 MG

## 2024-11-08 NOTE — ED TRIAGE NOTES
Pt ambulated to the treatment area with a slightly unsteady gait pt has chronic brace to right leg. Pt is accompanied by care giver pt has history intellectually delayed lives at [facility]. Care giver states \" he scrapped his left knee and right upper lip he fell it was last week just wanted to get his abrasions checked to make sure they are not infected. \" 
 full ROM neck/supple/symmetric

## 2025-03-13 ENCOUNTER — APPOINTMENT (OUTPATIENT)
Facility: HOSPITAL | Age: 65
End: 2025-03-13
Payer: MEDICAID

## 2025-03-13 ENCOUNTER — HOSPITAL ENCOUNTER (EMERGENCY)
Facility: HOSPITAL | Age: 65
Discharge: HOME | End: 2025-03-13
Attending: EMERGENCY MEDICINE
Payer: MEDICAID

## 2025-03-13 VITALS
BODY MASS INDEX: 21.19 KG/M2 | OXYGEN SATURATION: 95 % | RESPIRATION RATE: 16 BRPM | TEMPERATURE: 98.1 F | SYSTOLIC BLOOD PRESSURE: 111 MMHG | WEIGHT: 123.46 LBS | HEART RATE: 65 BPM | DIASTOLIC BLOOD PRESSURE: 65 MMHG

## 2025-03-13 DIAGNOSIS — J18.9 COMMUNITY ACQUIRED PNEUMONIA OF RIGHT MIDDLE LOBE OF LUNG: ICD-10-CM

## 2025-03-13 DIAGNOSIS — J10.1 INFLUENZA A: Primary | ICD-10-CM

## 2025-03-13 LAB
FLUAV RNA SPEC QL NAA+PROBE: DETECTED
FLUBV RNA SPEC QL NAA+PROBE: NOT DETECTED
SARS-COV-2 RNA RESP QL NAA+PROBE: NOT DETECTED
SOURCE: ABNORMAL

## 2025-03-13 PROCEDURE — 87636 SARSCOV2 & INF A&B AMP PRB: CPT

## 2025-03-13 PROCEDURE — 6370000000 HC RX 637 (ALT 250 FOR IP): Performed by: EMERGENCY MEDICINE

## 2025-03-13 PROCEDURE — 99284 EMERGENCY DEPT VISIT MOD MDM: CPT

## 2025-03-13 PROCEDURE — 71045 X-RAY EXAM CHEST 1 VIEW: CPT

## 2025-03-13 RX ORDER — DOXYCYCLINE HYCLATE 100 MG
100 TABLET ORAL 2 TIMES DAILY
Qty: 19 TABLET | Refills: 0 | Status: SHIPPED | OUTPATIENT
Start: 2025-03-13 | End: 2025-03-23

## 2025-03-13 RX ORDER — BENZONATATE 100 MG/1
100 CAPSULE ORAL
Status: COMPLETED | OUTPATIENT
Start: 2025-03-13 | End: 2025-03-13

## 2025-03-13 RX ORDER — DOXYCYCLINE HYCLATE 100 MG
100 TABLET ORAL
Status: COMPLETED | OUTPATIENT
Start: 2025-03-13 | End: 2025-03-13

## 2025-03-13 RX ORDER — GLYCERIN PEDIATRIC
1 SUPPOSITORY, RECTAL RECTAL ONCE
COMMUNITY

## 2025-03-13 RX ORDER — GLY/DIMETH/PETROLAT,WHT/WATER
CREAM (GRAM) TOPICAL PRN
COMMUNITY

## 2025-03-13 RX ORDER — BENZONATATE 100 MG/1
100 CAPSULE ORAL 3 TIMES DAILY PRN
Qty: 30 CAPSULE | Refills: 0 | Status: SHIPPED | OUTPATIENT
Start: 2025-03-13 | End: 2025-03-23

## 2025-03-13 RX ADMIN — BENZONATATE 100 MG: 100 CAPSULE ORAL at 17:12

## 2025-03-13 RX ADMIN — DOXYCYCLINE HYCLATE 100 MG: 100 TABLET, COATED ORAL at 18:12

## 2025-03-13 ASSESSMENT — PAIN - FUNCTIONAL ASSESSMENT: PAIN_FUNCTIONAL_ASSESSMENT: ADULT NONVERBAL PAIN SCALE (NPVS)

## 2025-03-13 NOTE — ED NOTES
The patient was discharged home by provider in stable condition. The patient is alert and in no respiratory distress. The patient's diagnosis, condition and treatment were explained. The patient caregiver  expressed understanding and denies any questions or concerns at this time. Patient leaves treatment area  in wheelchair with caregiver

## 2025-03-13 NOTE — ED PROVIDER NOTES
[facility]  EMERGENCY DEPARTMENT ENCOUNTER      Pt Name: Daniel Fontenot  MRN: [id number]  Birthdate 1960  Date of evaluation: 3/13/2025  Provider: Antoine Valentin DO      HISTORY OF PRESENT ILLNESS      HPI    64-year-old male with history as below presents to the emergency department with his caregiver stating that he was tested positive for the flu on 2/26 but his symptoms have been persistent including cough with worsening cough over the last couple of days since.  No noted fevers.    Nursing Notes were reviewed.    REVIEW OF SYSTEMS         Review of Systems        PAST MEDICAL HISTORY     Past Medical History:   Diagnosis Date    Cerebral palsy (HCC)     CP    Developmental delay     Diabetes (HCC)     Hypothyroidism     Mental retardation     Mild    Psoriasis     Psychiatric disorder     Seizures (HCC)          SURGICAL HISTORY     No past surgical history on file.      CURRENT MEDICATIONS       Previous Medications    ACETAMINOPHEN (TYLENOL) 325 MG TABLET    Take 2 tablets by mouth every 6 hours as needed for Pain    BUDESONIDE ER 9 MG TB24    Take 1 tablet by mouth daily    CARBAMAZEPINE (TEGRETOL XR) 200 MG EXTENDED RELEASE TABLET    Take 1 tablet by mouth 2 times daily Brand only medically necessary    CLOBETASOL PROPIONATE 0.05 % LOTN LOTION    Apply topically 2 times daily    DIVALPROEX (DEPAKOTE ER) 500 MG EXTENDED RELEASE TABLET    Take 3 tablets by mouth daily    ETANERCEPT (ENBREL) 50 MG/ML INJECTION    Inject into the skin    FERROUS SULFATE (IRON 325) 325 (65 FE) MG TABLET    Take by mouth every morning (before breakfast)    FESOTERODINE FUMARATE ER 8 MG TB24    Take 8 mg by mouth nightly    FLUVOXAMINE (LUVOX) 50 MG TABLET    Take by mouth 2 times daily    HYDROCORTISONE (WESTCORT) 0.2 % CREAM    Apply topically 2 times daily    KETOCONAZOLE (NIZORAL) 2 % SHAMPOO    Apply topically daily as needed    LEVOTHYROXINE (SYNTHROID) 100 MCG TABLET    Take 1 tablet by mouth

## 2025-03-13 NOTE — ED TRIAGE NOTES
Patient presents to treatment area via wheelchair. Patient caregiver reports patient had the flu on 2/26. Cough and runny nose persist since that time. Denies recent fevers.

## 2025-07-24 ENCOUNTER — HOSPITAL ENCOUNTER (INPATIENT)
Facility: HOSPITAL | Age: 65
LOS: 12 days | Discharge: HOME HEALTH | DRG: 501 | End: 2025-08-05
Attending: EMERGENCY MEDICINE | Admitting: HOSPITALIST
Payer: MEDICAID

## 2025-07-24 ENCOUNTER — APPOINTMENT (OUTPATIENT)
Facility: HOSPITAL | Age: 65
DRG: 501 | End: 2025-07-24
Payer: MEDICAID

## 2025-07-24 DIAGNOSIS — K59.00 CONSTIPATION, UNSPECIFIED CONSTIPATION TYPE: Primary | ICD-10-CM

## 2025-07-24 DIAGNOSIS — N49.2 CELLULITIS, SCROTUM: ICD-10-CM

## 2025-07-24 LAB
ALBUMIN SERPL-MCNC: 3.2 G/DL (ref 3.5–5)
ALBUMIN/GLOB SERPL: 0.7 (ref 1.1–2.2)
ALP SERPL-CCNC: 153 U/L (ref 45–117)
ALT SERPL-CCNC: 17 U/L (ref 12–78)
ANION GAP BLD CALC-SCNC: 12 (ref 10–20)
ANION GAP SERPL CALC-SCNC: 9 MMOL/L (ref 2–12)
APPEARANCE UR: CLEAR
AST SERPL-CCNC: 9 U/L (ref 15–37)
BACTERIA URNS QL MICRO: NEGATIVE /HPF
BASOPHILS # BLD: 0.03 K/UL (ref 0–0.1)
BASOPHILS NFR BLD: 0.2 % (ref 0–1)
BILIRUB SERPL-MCNC: 0.4 MG/DL (ref 0.2–1)
BILIRUB UR QL: NEGATIVE
BUN SERPL-MCNC: 9 MG/DL (ref 6–20)
BUN/CREAT SERPL: 16 (ref 12–20)
CA-I BLD-MCNC: 1.12 MMOL/L (ref 1.15–1.33)
CALCIUM SERPL-MCNC: 9 MG/DL (ref 8.5–10.1)
CHLORIDE BLD-SCNC: 93 MMOL/L (ref 100–111)
CHLORIDE SERPL-SCNC: 94 MMOL/L (ref 97–108)
CO2 BLD-SCNC: 31 MMOL/L (ref 22–29)
CO2 SERPL-SCNC: 30 MMOL/L (ref 21–32)
COLOR UR: ABNORMAL
COMMENT:: NORMAL
CREAT SERPL-MCNC: 0.58 MG/DL (ref 0.7–1.3)
CREAT UR-MCNC: 0.7 MG/DL (ref 0.6–1.3)
DIFFERENTIAL METHOD BLD: ABNORMAL
EOSINOPHIL # BLD: 0.06 K/UL (ref 0–0.4)
EOSINOPHIL NFR BLD: 0.5 % (ref 0–7)
EPITH CASTS URNS QL MICRO: ABNORMAL /LPF
ERYTHROCYTE [DISTWIDTH] IN BLOOD BY AUTOMATED COUNT: 11.8 % (ref 11.5–14.5)
GLOBULIN SER CALC-MCNC: 4.7 G/DL (ref 2–4)
GLUCOSE BLD STRIP.AUTO-MCNC: 101 MG/DL (ref 74–99)
GLUCOSE SERPL-MCNC: 106 MG/DL (ref 65–100)
GLUCOSE UR STRIP.AUTO-MCNC: NEGATIVE MG/DL
HCT VFR BLD AUTO: 33.2 % (ref 36.6–50.3)
HGB BLD-MCNC: 11.3 G/DL (ref 12.1–17)
HGB UR QL STRIP: ABNORMAL
IMM GRANULOCYTES # BLD AUTO: 0.06 K/UL (ref 0–0.04)
IMM GRANULOCYTES NFR BLD AUTO: 0.5 % (ref 0–0.5)
KETONES UR QL STRIP.AUTO: NEGATIVE MG/DL
LACTATE BLD-SCNC: 1.23 MMOL/L (ref 0.4–2)
LEUKOCYTE ESTERASE UR QL STRIP.AUTO: ABNORMAL
LYMPHOCYTES # BLD: 1.18 K/UL (ref 0.8–3.5)
LYMPHOCYTES NFR BLD: 9 % (ref 12–49)
MCH RBC QN AUTO: 32 PG (ref 26–34)
MCHC RBC AUTO-ENTMCNC: 34 G/DL (ref 30–36.5)
MCV RBC AUTO: 94.1 FL (ref 80–99)
MONOCYTES # BLD: 1.12 K/UL (ref 0–1)
MONOCYTES NFR BLD: 8.6 % (ref 5–13)
NEUTS SEG # BLD: 10.63 K/UL (ref 1.8–8)
NEUTS SEG NFR BLD: 81.2 % (ref 32–75)
NITRITE UR QL STRIP.AUTO: NEGATIVE
NRBC # BLD: 0 K/UL (ref 0–0.01)
NRBC BLD-RTO: 0 PER 100 WBC
PH UR STRIP: 6.5 (ref 5–8)
PLATELET # BLD AUTO: 245 K/UL (ref 150–400)
PMV BLD AUTO: 10.2 FL (ref 8.9–12.9)
POTASSIUM BLD-SCNC: 4.3 MMOL/L (ref 3.5–5.5)
POTASSIUM SERPL-SCNC: 3.6 MMOL/L (ref 3.5–5.1)
PROT SERPL-MCNC: 7.9 G/DL (ref 6.4–8.2)
PROT UR STRIP-MCNC: NEGATIVE MG/DL
RBC # BLD AUTO: 3.53 M/UL (ref 4.1–5.7)
RBC #/AREA URNS HPF: ABNORMAL /HPF (ref 0–5)
SERVICE CMNT-IMP: ABNORMAL
SODIUM BLD-SCNC: 136 MMOL/L (ref 136–145)
SODIUM SERPL-SCNC: 133 MMOL/L (ref 136–145)
SP GR UR REFRACTOMETRY: 1.01 (ref 1–1.03)
SPECIMEN HOLD: NORMAL
SPECIMEN SITE: ABNORMAL
UROBILINOGEN UR QL STRIP.AUTO: 0.2 EU/DL (ref 0.2–1)
WBC # BLD AUTO: 13.1 K/UL (ref 4.1–11.1)
WBC URNS QL MICRO: ABNORMAL /HPF (ref 0–4)

## 2025-07-24 PROCEDURE — 36415 COLL VENOUS BLD VENIPUNCTURE: CPT

## 2025-07-24 PROCEDURE — 80047 BASIC METABLC PNL IONIZED CA: CPT

## 2025-07-24 PROCEDURE — 85025 COMPLETE CBC W/AUTO DIFF WBC: CPT

## 2025-07-24 PROCEDURE — 6360000002 HC RX W HCPCS: Performed by: EMERGENCY MEDICINE

## 2025-07-24 PROCEDURE — 2580000003 HC RX 258: Performed by: EMERGENCY MEDICINE

## 2025-07-24 PROCEDURE — 1100000000 HC RM PRIVATE

## 2025-07-24 PROCEDURE — 6360000004 HC RX CONTRAST MEDICATION: Performed by: EMERGENCY MEDICINE

## 2025-07-24 PROCEDURE — 6370000000 HC RX 637 (ALT 250 FOR IP): Performed by: HOSPITALIST

## 2025-07-24 PROCEDURE — 87070 CULTURE OTHR SPECIMN AEROBIC: CPT

## 2025-07-24 PROCEDURE — 99285 EMERGENCY DEPT VISIT HI MDM: CPT

## 2025-07-24 PROCEDURE — 2500000003 HC RX 250 WO HCPCS: Performed by: HOSPITALIST

## 2025-07-24 PROCEDURE — 6360000002 HC RX W HCPCS: Performed by: HOSPITALIST

## 2025-07-24 PROCEDURE — 83605 ASSAY OF LACTIC ACID: CPT

## 2025-07-24 PROCEDURE — 81001 URINALYSIS AUTO W/SCOPE: CPT

## 2025-07-24 PROCEDURE — 87186 SC STD MICRODIL/AGAR DIL: CPT

## 2025-07-24 PROCEDURE — 74177 CT ABD & PELVIS W/CONTRAST: CPT

## 2025-07-24 PROCEDURE — 87086 URINE CULTURE/COLONY COUNT: CPT

## 2025-07-24 PROCEDURE — 80053 COMPREHEN METABOLIC PANEL: CPT

## 2025-07-24 PROCEDURE — 87088 URINE BACTERIA CULTURE: CPT

## 2025-07-24 PROCEDURE — 94761 N-INVAS EAR/PLS OXIMETRY MLT: CPT

## 2025-07-24 PROCEDURE — 87205 SMEAR GRAM STAIN: CPT

## 2025-07-24 PROCEDURE — 2580000003 HC RX 258

## 2025-07-24 RX ORDER — ACETAMINOPHEN 650 MG/1
650 SUPPOSITORY RECTAL EVERY 6 HOURS PRN
Status: DISCONTINUED | OUTPATIENT
Start: 2025-07-24 | End: 2025-08-05 | Stop reason: HOSPADM

## 2025-07-24 RX ORDER — POLYETHYLENE GLYCOL 3350 17 G/17G
17 POWDER, FOR SOLUTION ORAL DAILY PRN
Status: DISCONTINUED | OUTPATIENT
Start: 2025-07-24 | End: 2025-08-05 | Stop reason: HOSPADM

## 2025-07-24 RX ORDER — MAGNESIUM SULFATE IN WATER 40 MG/ML
2000 INJECTION, SOLUTION INTRAVENOUS PRN
Status: DISCONTINUED | OUTPATIENT
Start: 2025-07-24 | End: 2025-08-05 | Stop reason: HOSPADM

## 2025-07-24 RX ORDER — ACETAMINOPHEN 325 MG/1
650 TABLET ORAL EVERY 6 HOURS PRN
Status: DISCONTINUED | OUTPATIENT
Start: 2025-07-24 | End: 2025-08-05 | Stop reason: HOSPADM

## 2025-07-24 RX ORDER — POTASSIUM CHLORIDE 7.45 MG/ML
10 INJECTION INTRAVENOUS PRN
Status: DISCONTINUED | OUTPATIENT
Start: 2025-07-24 | End: 2025-08-05 | Stop reason: HOSPADM

## 2025-07-24 RX ORDER — ENOXAPARIN SODIUM 100 MG/ML
40 INJECTION SUBCUTANEOUS DAILY
Status: DISCONTINUED | OUTPATIENT
Start: 2025-07-25 | End: 2025-08-05 | Stop reason: HOSPADM

## 2025-07-24 RX ORDER — POTASSIUM CHLORIDE 750 MG/1
40 TABLET, EXTENDED RELEASE ORAL PRN
Status: DISCONTINUED | OUTPATIENT
Start: 2025-07-24 | End: 2025-08-05 | Stop reason: HOSPADM

## 2025-07-24 RX ORDER — SODIUM CHLORIDE 9 MG/ML
INJECTION, SOLUTION INTRAVENOUS PRN
Status: DISCONTINUED | OUTPATIENT
Start: 2025-07-24 | End: 2025-08-05 | Stop reason: HOSPADM

## 2025-07-24 RX ORDER — ONDANSETRON 2 MG/ML
4 INJECTION INTRAMUSCULAR; INTRAVENOUS EVERY 6 HOURS PRN
Status: DISCONTINUED | OUTPATIENT
Start: 2025-07-24 | End: 2025-08-05 | Stop reason: HOSPADM

## 2025-07-24 RX ORDER — DIVALPROEX SODIUM 500 MG/1
500 TABLET, FILM COATED, EXTENDED RELEASE ORAL 2 TIMES DAILY
Status: DISCONTINUED | OUTPATIENT
Start: 2025-07-25 | End: 2025-07-25

## 2025-07-24 RX ORDER — 0.9 % SODIUM CHLORIDE 0.9 %
500 INTRAVENOUS SOLUTION INTRAVENOUS ONCE
Status: COMPLETED | OUTPATIENT
Start: 2025-07-24 | End: 2025-07-24

## 2025-07-24 RX ORDER — CARBAMAZEPINE 200 MG/1
200 CAPSULE, EXTENDED RELEASE ORAL 2 TIMES DAILY
Status: DISCONTINUED | OUTPATIENT
Start: 2025-07-24 | End: 2025-08-05 | Stop reason: HOSPADM

## 2025-07-24 RX ORDER — SODIUM CHLORIDE 0.9 % (FLUSH) 0.9 %
5-40 SYRINGE (ML) INJECTION EVERY 12 HOURS SCHEDULED
Status: DISCONTINUED | OUTPATIENT
Start: 2025-07-24 | End: 2025-08-05 | Stop reason: HOSPADM

## 2025-07-24 RX ORDER — IOPAMIDOL 755 MG/ML
100 INJECTION, SOLUTION INTRAVASCULAR
Status: COMPLETED | OUTPATIENT
Start: 2025-07-24 | End: 2025-07-24

## 2025-07-24 RX ORDER — SODIUM CHLORIDE 0.9 % (FLUSH) 0.9 %
5-40 SYRINGE (ML) INJECTION PRN
Status: DISCONTINUED | OUTPATIENT
Start: 2025-07-24 | End: 2025-08-05 | Stop reason: HOSPADM

## 2025-07-24 RX ORDER — ONDANSETRON 4 MG/1
4 TABLET, ORALLY DISINTEGRATING ORAL EVERY 8 HOURS PRN
Status: DISCONTINUED | OUTPATIENT
Start: 2025-07-24 | End: 2025-08-05 | Stop reason: HOSPADM

## 2025-07-24 RX ADMIN — WATER 1000 MG: 1 INJECTION INTRAMUSCULAR; INTRAVENOUS; SUBCUTANEOUS at 22:38

## 2025-07-24 RX ADMIN — CARBAMAZEPINE 200 MG: 200 CAPSULE, EXTENDED RELEASE ORAL at 22:54

## 2025-07-24 RX ADMIN — SODIUM CHLORIDE 500 ML: 0.9 INJECTION, SOLUTION INTRAVENOUS at 18:57

## 2025-07-24 RX ADMIN — SODIUM CHLORIDE, PRESERVATIVE FREE 10 ML: 5 INJECTION INTRAVENOUS at 22:39

## 2025-07-24 RX ADMIN — SODIUM CHLORIDE 3000 MG: 9 INJECTION, SOLUTION INTRAVENOUS at 18:57

## 2025-07-24 RX ADMIN — PSYLLIUM HUSK 1 PACKET: 3.4 POWDER ORAL at 22:38

## 2025-07-24 RX ADMIN — IOPAMIDOL 100 ML: 755 INJECTION, SOLUTION INTRAVENOUS at 17:36

## 2025-07-24 ASSESSMENT — PAIN - FUNCTIONAL ASSESSMENT: PAIN_FUNCTIONAL_ASSESSMENT: ADULT NONVERBAL PAIN SCALE (NPVS)

## 2025-07-25 ENCOUNTER — APPOINTMENT (OUTPATIENT)
Facility: HOSPITAL | Age: 65
DRG: 501 | End: 2025-07-25
Payer: MEDICAID

## 2025-07-25 LAB
ANION GAP SERPL CALC-SCNC: 6 MMOL/L (ref 2–12)
BASOPHILS # BLD: 0.05 K/UL (ref 0–0.1)
BASOPHILS NFR BLD: 0.4 % (ref 0–1)
BUN SERPL-MCNC: 8 MG/DL (ref 6–20)
BUN/CREAT SERPL: 18 (ref 12–20)
CALCIUM SERPL-MCNC: 8.9 MG/DL (ref 8.5–10.1)
CHLORIDE SERPL-SCNC: 102 MMOL/L (ref 97–108)
CO2 SERPL-SCNC: 29 MMOL/L (ref 21–32)
COMMENT:: NORMAL
CREAT SERPL-MCNC: 0.45 MG/DL (ref 0.7–1.3)
DIFFERENTIAL METHOD BLD: ABNORMAL
EOSINOPHIL # BLD: 0.05 K/UL (ref 0–0.4)
EOSINOPHIL NFR BLD: 0.4 % (ref 0–7)
ERYTHROCYTE [DISTWIDTH] IN BLOOD BY AUTOMATED COUNT: 11.5 % (ref 11.5–14.5)
GLUCOSE SERPL-MCNC: 92 MG/DL (ref 65–100)
HCT VFR BLD AUTO: 32.9 % (ref 36.6–50.3)
HGB BLD-MCNC: 11.1 G/DL (ref 12.1–17)
IMM GRANULOCYTES # BLD AUTO: 0.06 K/UL (ref 0–0.04)
IMM GRANULOCYTES NFR BLD AUTO: 0.4 % (ref 0–0.5)
LYMPHOCYTES # BLD: 1.52 K/UL (ref 0.8–3.5)
LYMPHOCYTES NFR BLD: 10.7 % (ref 12–49)
MCH RBC QN AUTO: 32.3 PG (ref 26–34)
MCHC RBC AUTO-ENTMCNC: 33.7 G/DL (ref 30–36.5)
MCV RBC AUTO: 95.6 FL (ref 80–99)
MONOCYTES # BLD: 1.71 K/UL (ref 0–1)
MONOCYTES NFR BLD: 12 % (ref 5–13)
NEUTS SEG # BLD: 10.83 K/UL (ref 1.8–8)
NEUTS SEG NFR BLD: 76.1 % (ref 32–75)
NRBC # BLD: 0 K/UL (ref 0–0.01)
NRBC BLD-RTO: 0 PER 100 WBC
PLATELET # BLD AUTO: 253 K/UL (ref 150–400)
PMV BLD AUTO: 10 FL (ref 8.9–12.9)
POTASSIUM SERPL-SCNC: 3.7 MMOL/L (ref 3.5–5.1)
RBC # BLD AUTO: 3.44 M/UL (ref 4.1–5.7)
SODIUM SERPL-SCNC: 137 MMOL/L (ref 136–145)
SPECIMEN HOLD: NORMAL
WBC # BLD AUTO: 14.2 K/UL (ref 4.1–11.1)

## 2025-07-25 PROCEDURE — 6360000002 HC RX W HCPCS: Performed by: HOSPITALIST

## 2025-07-25 PROCEDURE — 6370000000 HC RX 637 (ALT 250 FOR IP): Performed by: HOSPITALIST

## 2025-07-25 PROCEDURE — 94761 N-INVAS EAR/PLS OXIMETRY MLT: CPT

## 2025-07-25 PROCEDURE — 85025 COMPLETE CBC W/AUTO DIFF WBC: CPT

## 2025-07-25 PROCEDURE — 36415 COLL VENOUS BLD VENIPUNCTURE: CPT

## 2025-07-25 PROCEDURE — 2500000003 HC RX 250 WO HCPCS: Performed by: HOSPITALIST

## 2025-07-25 PROCEDURE — 1100000000 HC RM PRIVATE

## 2025-07-25 PROCEDURE — 80048 BASIC METABOLIC PNL TOTAL CA: CPT

## 2025-07-25 RX ORDER — DIVALPROEX SODIUM 250 MG/1
250 TABLET, DELAYED RELEASE ORAL 2 TIMES DAILY
Status: DISCONTINUED | OUTPATIENT
Start: 2025-07-25 | End: 2025-08-05 | Stop reason: HOSPADM

## 2025-07-25 RX ORDER — DIVALPROEX SODIUM 250 MG/1
500 TABLET, DELAYED RELEASE ORAL 2 TIMES DAILY
COMMUNITY

## 2025-07-25 RX ADMIN — DIVALPROEX SODIUM 250 MG: 250 TABLET, DELAYED RELEASE ORAL at 20:32

## 2025-07-25 RX ADMIN — ENOXAPARIN SODIUM 40 MG: 100 INJECTION SUBCUTANEOUS at 09:17

## 2025-07-25 RX ADMIN — CARBAMAZEPINE 200 MG: 200 CAPSULE, EXTENDED RELEASE ORAL at 09:17

## 2025-07-25 RX ADMIN — WATER 1000 MG: 1 INJECTION INTRAMUSCULAR; INTRAVENOUS; SUBCUTANEOUS at 20:31

## 2025-07-25 RX ADMIN — CARBAMAZEPINE 200 MG: 200 CAPSULE, EXTENDED RELEASE ORAL at 20:32

## 2025-07-25 RX ADMIN — PSYLLIUM HUSK 1 PACKET: 3.4 POWDER ORAL at 09:25

## 2025-07-25 RX ADMIN — SODIUM CHLORIDE, PRESERVATIVE FREE 10 ML: 5 INJECTION INTRAVENOUS at 20:32

## 2025-07-25 RX ADMIN — SODIUM CHLORIDE, PRESERVATIVE FREE 5 ML: 5 INJECTION INTRAVENOUS at 09:18

## 2025-07-26 ENCOUNTER — ANESTHESIA EVENT (OUTPATIENT)
Facility: HOSPITAL | Age: 65
End: 2025-07-26
Payer: MEDICAID

## 2025-07-26 LAB
BACTERIA SPEC CULT: NORMAL
BASOPHILS # BLD: 0.05 K/UL (ref 0–0.1)
BASOPHILS NFR BLD: 0.4 % (ref 0–1)
DIFFERENTIAL METHOD BLD: ABNORMAL
EOSINOPHIL # BLD: 0.05 K/UL (ref 0–0.4)
EOSINOPHIL NFR BLD: 0.4 % (ref 0–7)
ERYTHROCYTE [DISTWIDTH] IN BLOOD BY AUTOMATED COUNT: 11.2 % (ref 11.5–14.5)
GRAM STN SPEC: NORMAL
GRAM STN SPEC: NORMAL
HCT VFR BLD AUTO: 37.8 % (ref 36.6–50.3)
HGB BLD-MCNC: 12.8 G/DL (ref 12.1–17)
IMM GRANULOCYTES # BLD AUTO: 0.03 K/UL (ref 0–0.04)
IMM GRANULOCYTES NFR BLD AUTO: 0.3 % (ref 0–0.5)
LYMPHOCYTES # BLD: 1.11 K/UL (ref 0.8–3.5)
LYMPHOCYTES NFR BLD: 9.8 % (ref 12–49)
MCH RBC QN AUTO: 32.6 PG (ref 26–34)
MCHC RBC AUTO-ENTMCNC: 33.9 G/DL (ref 30–36.5)
MCV RBC AUTO: 96.2 FL (ref 80–99)
MONOCYTES # BLD: 0.98 K/UL (ref 0–1)
MONOCYTES NFR BLD: 8.7 % (ref 5–13)
NEUTS SEG # BLD: 9.08 K/UL (ref 1.8–8)
NEUTS SEG NFR BLD: 80.4 % (ref 32–75)
NRBC # BLD: 0 K/UL (ref 0–0.01)
NRBC BLD-RTO: 0 PER 100 WBC
PLATELET # BLD AUTO: 306 K/UL (ref 150–400)
PMV BLD AUTO: 9.7 FL (ref 8.9–12.9)
RBC # BLD AUTO: 3.93 M/UL (ref 4.1–5.7)
SERVICE CMNT-IMP: NORMAL
WBC # BLD AUTO: 11.3 K/UL (ref 4.1–11.1)

## 2025-07-26 PROCEDURE — 6370000000 HC RX 637 (ALT 250 FOR IP)

## 2025-07-26 PROCEDURE — 2500000003 HC RX 250 WO HCPCS: Performed by: HOSPITALIST

## 2025-07-26 PROCEDURE — 6360000002 HC RX W HCPCS: Performed by: HOSPITALIST

## 2025-07-26 PROCEDURE — 36415 COLL VENOUS BLD VENIPUNCTURE: CPT

## 2025-07-26 PROCEDURE — 85025 COMPLETE CBC W/AUTO DIFF WBC: CPT

## 2025-07-26 PROCEDURE — 6370000000 HC RX 637 (ALT 250 FOR IP): Performed by: HOSPITALIST

## 2025-07-26 PROCEDURE — 94761 N-INVAS EAR/PLS OXIMETRY MLT: CPT

## 2025-07-26 PROCEDURE — 1100000000 HC RM PRIVATE

## 2025-07-26 RX ORDER — TRIAMCINOLONE ACETONIDE 1 MG/G
OINTMENT TOPICAL 2 TIMES DAILY
Status: DISCONTINUED | OUTPATIENT
Start: 2025-07-26 | End: 2025-08-05 | Stop reason: HOSPADM

## 2025-07-26 RX ORDER — LANOLIN/MINERAL OIL
LOTION (ML) TOPICAL PRN
Status: DISCONTINUED | OUTPATIENT
Start: 2025-07-26 | End: 2025-08-05 | Stop reason: HOSPADM

## 2025-07-26 RX ADMIN — SODIUM CHLORIDE, PRESERVATIVE FREE 10 ML: 5 INJECTION INTRAVENOUS at 21:55

## 2025-07-26 RX ADMIN — CARBAMAZEPINE 200 MG: 200 CAPSULE, EXTENDED RELEASE ORAL at 11:28

## 2025-07-26 RX ADMIN — SODIUM CHLORIDE, PRESERVATIVE FREE 10 ML: 5 INJECTION INTRAVENOUS at 11:30

## 2025-07-26 RX ADMIN — WATER 1000 MG: 1 INJECTION INTRAMUSCULAR; INTRAVENOUS; SUBCUTANEOUS at 21:48

## 2025-07-26 RX ADMIN — TRIAMCINOLONE ACETONIDE: 1 OINTMENT TOPICAL at 21:48

## 2025-07-26 RX ADMIN — DIVALPROEX SODIUM 250 MG: 250 TABLET, DELAYED RELEASE ORAL at 11:28

## 2025-07-26 RX ADMIN — CARBAMAZEPINE 200 MG: 200 CAPSULE, EXTENDED RELEASE ORAL at 21:37

## 2025-07-26 RX ADMIN — TRIAMCINOLONE ACETONIDE: 1 OINTMENT TOPICAL at 13:46

## 2025-07-26 RX ADMIN — DIVALPROEX SODIUM 250 MG: 250 TABLET, DELAYED RELEASE ORAL at 21:49

## 2025-07-27 ENCOUNTER — ANESTHESIA (OUTPATIENT)
Facility: HOSPITAL | Age: 65
End: 2025-07-27
Payer: MEDICAID

## 2025-07-27 LAB
ANION GAP SERPL CALC-SCNC: 8 MMOL/L (ref 2–12)
BACTERIA SPEC CULT: ABNORMAL
BASOPHILS # BLD: 0.04 K/UL (ref 0–0.1)
BASOPHILS NFR BLD: 0.4 % (ref 0–1)
BUN SERPL-MCNC: 18 MG/DL (ref 6–20)
BUN/CREAT SERPL: 36 (ref 12–20)
CALCIUM SERPL-MCNC: 9.3 MG/DL (ref 8.5–10.1)
CC UR VC: ABNORMAL
CHLORIDE SERPL-SCNC: 108 MMOL/L (ref 97–108)
CO2 SERPL-SCNC: 27 MMOL/L (ref 21–32)
CREAT SERPL-MCNC: 0.5 MG/DL (ref 0.7–1.3)
DIFFERENTIAL METHOD BLD: ABNORMAL
EOSINOPHIL # BLD: 0.01 K/UL (ref 0–0.4)
EOSINOPHIL NFR BLD: 0.1 % (ref 0–7)
ERYTHROCYTE [DISTWIDTH] IN BLOOD BY AUTOMATED COUNT: 11.3 % (ref 11.5–14.5)
GLUCOSE SERPL-MCNC: 125 MG/DL (ref 65–100)
HCT VFR BLD AUTO: 39.3 % (ref 36.6–50.3)
HGB BLD-MCNC: 13.1 G/DL (ref 12.1–17)
IMM GRANULOCYTES # BLD AUTO: 0.03 K/UL (ref 0–0.04)
IMM GRANULOCYTES NFR BLD AUTO: 0.3 % (ref 0–0.5)
LYMPHOCYTES # BLD: 0.87 K/UL (ref 0.8–3.5)
LYMPHOCYTES NFR BLD: 7.7 % (ref 12–49)
MCH RBC QN AUTO: 31.8 PG (ref 26–34)
MCHC RBC AUTO-ENTMCNC: 33.3 G/DL (ref 30–36.5)
MCV RBC AUTO: 95.4 FL (ref 80–99)
MONOCYTES # BLD: 0.39 K/UL (ref 0–1)
MONOCYTES NFR BLD: 3.5 % (ref 5–13)
NEUTS SEG # BLD: 9.96 K/UL (ref 1.8–8)
NEUTS SEG NFR BLD: 88 % (ref 32–75)
NRBC # BLD: 0 K/UL (ref 0–0.01)
NRBC BLD-RTO: 0 PER 100 WBC
PLATELET # BLD AUTO: 377 K/UL (ref 150–400)
PMV BLD AUTO: 9.5 FL (ref 8.9–12.9)
POTASSIUM SERPL-SCNC: 4.2 MMOL/L (ref 3.5–5.1)
RBC # BLD AUTO: 4.12 M/UL (ref 4.1–5.7)
SERVICE CMNT-IMP: ABNORMAL
SODIUM SERPL-SCNC: 143 MMOL/L (ref 136–145)
WBC # BLD AUTO: 11.3 K/UL (ref 4.1–11.1)

## 2025-07-27 PROCEDURE — 3600000012 HC SURGERY LEVEL 2 ADDTL 15MIN: Performed by: UROLOGY

## 2025-07-27 PROCEDURE — 3600000002 HC SURGERY LEVEL 2 BASE: Performed by: UROLOGY

## 2025-07-27 PROCEDURE — 87181 SC STD AGAR DILUTION PER AGT: CPT

## 2025-07-27 PROCEDURE — 85025 COMPLETE CBC W/AUTO DIFF WBC: CPT

## 2025-07-27 PROCEDURE — 2500000003 HC RX 250 WO HCPCS: Performed by: HOSPITALIST

## 2025-07-27 PROCEDURE — 87070 CULTURE OTHR SPECIMN AEROBIC: CPT

## 2025-07-27 PROCEDURE — 6360000002 HC RX W HCPCS

## 2025-07-27 PROCEDURE — 87077 CULTURE AEROBIC IDENTIFY: CPT

## 2025-07-27 PROCEDURE — 3700000001 HC ADD 15 MINUTES (ANESTHESIA): Performed by: UROLOGY

## 2025-07-27 PROCEDURE — 6370000000 HC RX 637 (ALT 250 FOR IP): Performed by: UROLOGY

## 2025-07-27 PROCEDURE — 87186 SC STD MICRODIL/AGAR DIL: CPT

## 2025-07-27 PROCEDURE — 6360000002 HC RX W HCPCS: Performed by: NURSE ANESTHETIST, CERTIFIED REGISTERED

## 2025-07-27 PROCEDURE — 87075 CULTR BACTERIA EXCEPT BLOOD: CPT

## 2025-07-27 PROCEDURE — 7100000000 HC PACU RECOVERY - FIRST 15 MIN: Performed by: UROLOGY

## 2025-07-27 PROCEDURE — 6370000000 HC RX 637 (ALT 250 FOR IP): Performed by: HOSPITALIST

## 2025-07-27 PROCEDURE — 2500000003 HC RX 250 WO HCPCS

## 2025-07-27 PROCEDURE — 1100000000 HC RM PRIVATE

## 2025-07-27 PROCEDURE — 0V950ZZ DRAINAGE OF SCROTUM, OPEN APPROACH: ICD-10-PCS | Performed by: UROLOGY

## 2025-07-27 PROCEDURE — 94761 N-INVAS EAR/PLS OXIMETRY MLT: CPT

## 2025-07-27 PROCEDURE — 2709999900 HC NON-CHARGEABLE SUPPLY: Performed by: UROLOGY

## 2025-07-27 PROCEDURE — 36415 COLL VENOUS BLD VENIPUNCTURE: CPT

## 2025-07-27 PROCEDURE — 80048 BASIC METABOLIC PNL TOTAL CA: CPT

## 2025-07-27 PROCEDURE — 6360000002 HC RX W HCPCS: Performed by: UROLOGY

## 2025-07-27 PROCEDURE — 87205 SMEAR GRAM STAIN: CPT

## 2025-07-27 PROCEDURE — 7100000001 HC PACU RECOVERY - ADDTL 15 MIN: Performed by: UROLOGY

## 2025-07-27 PROCEDURE — 3700000000 HC ANESTHESIA ATTENDED CARE: Performed by: UROLOGY

## 2025-07-27 RX ORDER — LIDOCAINE HYDROCHLORIDE 10 MG/ML
1 INJECTION, SOLUTION EPIDURAL; INFILTRATION; INTRACAUDAL; PERINEURAL
Status: DISCONTINUED | OUTPATIENT
Start: 2025-07-27 | End: 2025-07-27 | Stop reason: HOSPADM

## 2025-07-27 RX ORDER — MIDAZOLAM HYDROCHLORIDE 2 MG/2ML
2 INJECTION, SOLUTION INTRAMUSCULAR; INTRAVENOUS
Status: DISCONTINUED | OUTPATIENT
Start: 2025-07-27 | End: 2025-07-27 | Stop reason: HOSPADM

## 2025-07-27 RX ORDER — FAMOTIDINE 10 MG/ML
INJECTION, SOLUTION INTRAVENOUS
Status: DISCONTINUED | OUTPATIENT
Start: 2025-07-27 | End: 2025-07-27 | Stop reason: SDUPTHER

## 2025-07-27 RX ORDER — FENTANYL CITRATE 50 UG/ML
100 INJECTION, SOLUTION INTRAMUSCULAR; INTRAVENOUS
Status: DISCONTINUED | OUTPATIENT
Start: 2025-07-27 | End: 2025-07-27 | Stop reason: HOSPADM

## 2025-07-27 RX ORDER — ONDANSETRON 2 MG/ML
INJECTION INTRAMUSCULAR; INTRAVENOUS
Status: DISCONTINUED | OUTPATIENT
Start: 2025-07-27 | End: 2025-07-27 | Stop reason: SDUPTHER

## 2025-07-27 RX ORDER — SODIUM CHLORIDE, SODIUM LACTATE, POTASSIUM CHLORIDE, CALCIUM CHLORIDE 600; 310; 30; 20 MG/100ML; MG/100ML; MG/100ML; MG/100ML
INJECTION, SOLUTION INTRAVENOUS CONTINUOUS
Status: DISCONTINUED | OUTPATIENT
Start: 2025-07-27 | End: 2025-07-27 | Stop reason: HOSPADM

## 2025-07-27 RX ORDER — DIPHENHYDRAMINE HYDROCHLORIDE 50 MG/ML
12.5 INJECTION, SOLUTION INTRAMUSCULAR; INTRAVENOUS
Status: DISCONTINUED | OUTPATIENT
Start: 2025-07-27 | End: 2025-07-27 | Stop reason: HOSPADM

## 2025-07-27 RX ORDER — FENTANYL CITRATE 50 UG/ML
INJECTION, SOLUTION INTRAMUSCULAR; INTRAVENOUS
Status: DISCONTINUED | OUTPATIENT
Start: 2025-07-27 | End: 2025-07-27 | Stop reason: SDUPTHER

## 2025-07-27 RX ORDER — DEXAMETHASONE SODIUM PHOSPHATE 4 MG/ML
INJECTION, SOLUTION INTRA-ARTICULAR; INTRALESIONAL; INTRAMUSCULAR; INTRAVENOUS; SOFT TISSUE
Status: DISCONTINUED | OUTPATIENT
Start: 2025-07-27 | End: 2025-07-27 | Stop reason: SDUPTHER

## 2025-07-27 RX ORDER — LEVOFLOXACIN 5 MG/ML
750 INJECTION, SOLUTION INTRAVENOUS EVERY 24 HOURS
Status: DISCONTINUED | OUTPATIENT
Start: 2025-07-27 | End: 2025-07-29

## 2025-07-27 RX ORDER — BUPIVACAINE HYDROCHLORIDE 5 MG/ML
INJECTION, SOLUTION PERINEURAL PRN
Status: DISCONTINUED | OUTPATIENT
Start: 2025-07-27 | End: 2025-07-27 | Stop reason: ALTCHOICE

## 2025-07-27 RX ORDER — MIDAZOLAM HYDROCHLORIDE 1 MG/ML
INJECTION, SOLUTION INTRAMUSCULAR; INTRAVENOUS
Status: DISCONTINUED | OUTPATIENT
Start: 2025-07-27 | End: 2025-07-27 | Stop reason: SDUPTHER

## 2025-07-27 RX ORDER — ONDANSETRON 2 MG/ML
4 INJECTION INTRAMUSCULAR; INTRAVENOUS
Status: DISCONTINUED | OUTPATIENT
Start: 2025-07-27 | End: 2025-07-27 | Stop reason: HOSPADM

## 2025-07-27 RX ORDER — PROPOFOL 10 MG/ML
INJECTION, EMULSION INTRAVENOUS
Status: DISCONTINUED | OUTPATIENT
Start: 2025-07-27 | End: 2025-07-27 | Stop reason: SDUPTHER

## 2025-07-27 RX ADMIN — HYDROMORPHONE HYDROCHLORIDE 0.25 MG: 1 INJECTION, SOLUTION INTRAMUSCULAR; INTRAVENOUS; SUBCUTANEOUS at 18:09

## 2025-07-27 RX ADMIN — LEVOFLOXACIN 750 MG: 750 INJECTION, SOLUTION INTRAVENOUS at 13:22

## 2025-07-27 RX ADMIN — MIDAZOLAM HYDROCHLORIDE 1 MG: 1 INJECTION, SOLUTION INTRAMUSCULAR; INTRAVENOUS at 08:05

## 2025-07-27 RX ADMIN — DIVALPROEX SODIUM 250 MG: 250 TABLET, DELAYED RELEASE ORAL at 20:30

## 2025-07-27 RX ADMIN — DIVALPROEX SODIUM 250 MG: 250 TABLET, DELAYED RELEASE ORAL at 07:19

## 2025-07-27 RX ADMIN — PROPOFOL 25 MG: 10 INJECTION, EMULSION INTRAVENOUS at 08:24

## 2025-07-27 RX ADMIN — PROPOFOL 25 MG: 10 INJECTION, EMULSION INTRAVENOUS at 08:10

## 2025-07-27 RX ADMIN — SODIUM CHLORIDE, PRESERVATIVE FREE 10 ML: 5 INJECTION INTRAVENOUS at 07:28

## 2025-07-27 RX ADMIN — CARBAMAZEPINE 200 MG: 200 CAPSULE, EXTENDED RELEASE ORAL at 07:19

## 2025-07-27 RX ADMIN — CARBAMAZEPINE 200 MG: 200 CAPSULE, EXTENDED RELEASE ORAL at 20:30

## 2025-07-27 RX ADMIN — MIDAZOLAM HYDROCHLORIDE 1 MG: 1 INJECTION, SOLUTION INTRAMUSCULAR; INTRAVENOUS at 08:13

## 2025-07-27 RX ADMIN — PROPOFOL 50 MG: 10 INJECTION, EMULSION INTRAVENOUS at 08:16

## 2025-07-27 RX ADMIN — WATER 1000 MG: 1 INJECTION INTRAMUSCULAR; INTRAVENOUS; SUBCUTANEOUS at 20:30

## 2025-07-27 RX ADMIN — DEXAMETHASONE SODIUM PHOSPHATE 4 MG: 4 INJECTION, SOLUTION INTRAMUSCULAR; INTRAVENOUS at 08:14

## 2025-07-27 RX ADMIN — ACETAMINOPHEN 650 MG: 325 TABLET ORAL at 20:30

## 2025-07-27 RX ADMIN — FENTANYL CITRATE 50 MCG: 50 INJECTION, SOLUTION INTRAMUSCULAR; INTRAVENOUS at 08:05

## 2025-07-27 RX ADMIN — FAMOTIDINE 20 MG: 10 INJECTION, SOLUTION INTRAVENOUS at 08:08

## 2025-07-27 RX ADMIN — ONDANSETRON 4 MG: 2 INJECTION, SOLUTION INTRAMUSCULAR; INTRAVENOUS at 08:09

## 2025-07-27 RX ADMIN — TRIAMCINOLONE ACETONIDE: 1 OINTMENT TOPICAL at 07:28

## 2025-07-27 RX ADMIN — TRIAMCINOLONE ACETONIDE: 1 OINTMENT TOPICAL at 21:48

## 2025-07-27 RX ADMIN — FENTANYL CITRATE 50 MCG: 50 INJECTION, SOLUTION INTRAMUSCULAR; INTRAVENOUS at 08:14

## 2025-07-27 ASSESSMENT — PAIN - FUNCTIONAL ASSESSMENT
PAIN_FUNCTIONAL_ASSESSMENT: ADULT NONVERBAL PAIN SCALE (NPVS)
PAIN_FUNCTIONAL_ASSESSMENT: ADULT NONVERBAL PAIN SCALE (NPVS)

## 2025-07-27 ASSESSMENT — PAIN DESCRIPTION - LOCATION: LOCATION: SCROTUM

## 2025-07-28 LAB
ANION GAP SERPL CALC-SCNC: 7 MMOL/L (ref 2–12)
BASOPHILS # BLD: 0.05 K/UL (ref 0–0.1)
BASOPHILS NFR BLD: 0.3 % (ref 0–1)
BUN SERPL-MCNC: 27 MG/DL (ref 6–20)
BUN/CREAT SERPL: 40 (ref 12–20)
CALCIUM SERPL-MCNC: 8.8 MG/DL (ref 8.5–10.1)
CHLORIDE SERPL-SCNC: 113 MMOL/L (ref 97–108)
CO2 SERPL-SCNC: 26 MMOL/L (ref 21–32)
CREAT SERPL-MCNC: 0.67 MG/DL (ref 0.7–1.3)
DIFFERENTIAL METHOD BLD: ABNORMAL
EOSINOPHIL # BLD: 0.05 K/UL (ref 0–0.4)
EOSINOPHIL NFR BLD: 0.3 % (ref 0–7)
ERYTHROCYTE [DISTWIDTH] IN BLOOD BY AUTOMATED COUNT: 11.2 % (ref 11.5–14.5)
GLUCOSE SERPL-MCNC: 119 MG/DL (ref 65–100)
HCT VFR BLD AUTO: 39.1 % (ref 36.6–50.3)
HGB BLD-MCNC: 12.9 G/DL (ref 12.1–17)
IMM GRANULOCYTES # BLD AUTO: 0.05 K/UL (ref 0–0.04)
IMM GRANULOCYTES NFR BLD AUTO: 0.3 % (ref 0–0.5)
LYMPHOCYTES # BLD: 1.58 K/UL (ref 0.8–3.5)
LYMPHOCYTES NFR BLD: 10.7 % (ref 12–49)
MCH RBC QN AUTO: 32.3 PG (ref 26–34)
MCHC RBC AUTO-ENTMCNC: 33 G/DL (ref 30–36.5)
MCV RBC AUTO: 98 FL (ref 80–99)
MONOCYTES # BLD: 1.51 K/UL (ref 0–1)
MONOCYTES NFR BLD: 10.2 % (ref 5–13)
NEUTS SEG # BLD: 11.59 K/UL (ref 1.8–8)
NEUTS SEG NFR BLD: 78.2 % (ref 32–75)
NRBC # BLD: 0 K/UL (ref 0–0.01)
NRBC BLD-RTO: 0 PER 100 WBC
PLATELET # BLD AUTO: 349 K/UL (ref 150–400)
PMV BLD AUTO: 9.7 FL (ref 8.9–12.9)
POTASSIUM SERPL-SCNC: 4 MMOL/L (ref 3.5–5.1)
RBC # BLD AUTO: 3.99 M/UL (ref 4.1–5.7)
SODIUM SERPL-SCNC: 146 MMOL/L (ref 136–145)
WBC # BLD AUTO: 14.8 K/UL (ref 4.1–11.1)

## 2025-07-28 PROCEDURE — 6360000002 HC RX W HCPCS

## 2025-07-28 PROCEDURE — 36415 COLL VENOUS BLD VENIPUNCTURE: CPT

## 2025-07-28 PROCEDURE — 85025 COMPLETE CBC W/AUTO DIFF WBC: CPT

## 2025-07-28 PROCEDURE — 2580000003 HC RX 258

## 2025-07-28 PROCEDURE — 2500000003 HC RX 250 WO HCPCS: Performed by: UROLOGY

## 2025-07-28 PROCEDURE — 6370000000 HC RX 637 (ALT 250 FOR IP): Performed by: UROLOGY

## 2025-07-28 PROCEDURE — 1100000000 HC RM PRIVATE

## 2025-07-28 PROCEDURE — 6360000002 HC RX W HCPCS: Performed by: UROLOGY

## 2025-07-28 PROCEDURE — 2500000003 HC RX 250 WO HCPCS

## 2025-07-28 PROCEDURE — 6370000000 HC RX 637 (ALT 250 FOR IP)

## 2025-07-28 PROCEDURE — 94761 N-INVAS EAR/PLS OXIMETRY MLT: CPT

## 2025-07-28 PROCEDURE — 80048 BASIC METABOLIC PNL TOTAL CA: CPT

## 2025-07-28 RX ORDER — RISPERIDONE 1 MG/1
1 TABLET ORAL DAILY
Status: DISCONTINUED | OUTPATIENT
Start: 2025-07-28 | End: 2025-08-05 | Stop reason: HOSPADM

## 2025-07-28 RX ORDER — SODIUM CHLORIDE, SODIUM LACTATE, POTASSIUM CHLORIDE, CALCIUM CHLORIDE 600; 310; 30; 20 MG/100ML; MG/100ML; MG/100ML; MG/100ML
INJECTION, SOLUTION INTRAVENOUS CONTINUOUS
Status: DISCONTINUED | OUTPATIENT
Start: 2025-07-28 | End: 2025-08-05 | Stop reason: HOSPADM

## 2025-07-28 RX ADMIN — CARBAMAZEPINE 200 MG: 200 CAPSULE, EXTENDED RELEASE ORAL at 07:40

## 2025-07-28 RX ADMIN — HYDROMORPHONE HYDROCHLORIDE 0.5 MG: 1 INJECTION, SOLUTION INTRAMUSCULAR; INTRAVENOUS; SUBCUTANEOUS at 12:58

## 2025-07-28 RX ADMIN — TRIAMCINOLONE ACETONIDE: 1 OINTMENT TOPICAL at 07:49

## 2025-07-28 RX ADMIN — DIVALPROEX SODIUM 250 MG: 250 TABLET, DELAYED RELEASE ORAL at 07:40

## 2025-07-28 RX ADMIN — TRIAMCINOLONE ACETONIDE: 1 OINTMENT TOPICAL at 23:41

## 2025-07-28 RX ADMIN — SODIUM CHLORIDE, SODIUM LACTATE, POTASSIUM CHLORIDE, AND CALCIUM CHLORIDE: .6; .31; .03; .02 INJECTION, SOLUTION INTRAVENOUS at 11:32

## 2025-07-28 RX ADMIN — ENOXAPARIN SODIUM 40 MG: 100 INJECTION SUBCUTANEOUS at 07:40

## 2025-07-28 RX ADMIN — SODIUM CHLORIDE, PRESERVATIVE FREE 10 ML: 5 INJECTION INTRAVENOUS at 07:40

## 2025-07-28 RX ADMIN — CARBAMAZEPINE 200 MG: 200 CAPSULE, EXTENDED RELEASE ORAL at 20:55

## 2025-07-28 RX ADMIN — WATER 1000 MG: 1 INJECTION INTRAMUSCULAR; INTRAVENOUS; SUBCUTANEOUS at 20:56

## 2025-07-28 RX ADMIN — DIVALPROEX SODIUM 250 MG: 250 TABLET, DELAYED RELEASE ORAL at 20:55

## 2025-07-28 RX ADMIN — LEVOFLOXACIN 750 MG: 750 INJECTION, SOLUTION INTRAVENOUS at 13:01

## 2025-07-28 RX ADMIN — RISPERIDONE 1 MG: 0.25 TABLET, FILM COATED ORAL at 07:44

## 2025-07-28 RX ADMIN — HYDROMORPHONE HYDROCHLORIDE 0.5 MG: 1 INJECTION, SOLUTION INTRAMUSCULAR; INTRAVENOUS; SUBCUTANEOUS at 07:45

## 2025-07-28 RX ADMIN — PSYLLIUM HUSK 1 PACKET: 3.4 POWDER ORAL at 07:40

## 2025-07-28 RX ADMIN — HYDROMORPHONE HYDROCHLORIDE 0.25 MG: 1 INJECTION, SOLUTION INTRAMUSCULAR; INTRAVENOUS; SUBCUTANEOUS at 04:17

## 2025-07-29 LAB
ANION GAP SERPL CALC-SCNC: 4 MMOL/L (ref 2–12)
BASOPHILS # BLD: 0.04 K/UL (ref 0–0.1)
BASOPHILS NFR BLD: 0.6 % (ref 0–1)
BUN SERPL-MCNC: 27 MG/DL (ref 6–20)
BUN/CREAT SERPL: 50 (ref 12–20)
CALCIUM SERPL-MCNC: 9.1 MG/DL (ref 8.5–10.1)
CHLORIDE SERPL-SCNC: 114 MMOL/L (ref 97–108)
CO2 SERPL-SCNC: 28 MMOL/L (ref 21–32)
CREAT SERPL-MCNC: 0.54 MG/DL (ref 0.7–1.3)
DIFFERENTIAL METHOD BLD: ABNORMAL
EOSINOPHIL # BLD: 0.14 K/UL (ref 0–0.4)
EOSINOPHIL NFR BLD: 1.9 % (ref 0–7)
ERYTHROCYTE [DISTWIDTH] IN BLOOD BY AUTOMATED COUNT: 11.5 % (ref 11.5–14.5)
GLUCOSE SERPL-MCNC: 105 MG/DL (ref 65–100)
HCT VFR BLD AUTO: 38.9 % (ref 36.6–50.3)
HGB BLD-MCNC: 12.4 G/DL (ref 12.1–17)
IMM GRANULOCYTES # BLD AUTO: 0.03 K/UL (ref 0–0.04)
IMM GRANULOCYTES NFR BLD AUTO: 0.4 % (ref 0–0.5)
LYMPHOCYTES # BLD: 1.8 K/UL (ref 0.8–3.5)
LYMPHOCYTES NFR BLD: 25 % (ref 12–49)
MCH RBC QN AUTO: 31.9 PG (ref 26–34)
MCHC RBC AUTO-ENTMCNC: 31.9 G/DL (ref 30–36.5)
MCV RBC AUTO: 100 FL (ref 80–99)
MONOCYTES # BLD: 0.87 K/UL (ref 0–1)
MONOCYTES NFR BLD: 12.1 % (ref 5–13)
NEUTS SEG # BLD: 4.31 K/UL (ref 1.8–8)
NEUTS SEG NFR BLD: 60 % (ref 32–75)
NRBC # BLD: 0 K/UL (ref 0–0.01)
NRBC BLD-RTO: 0 PER 100 WBC
PLATELET # BLD AUTO: 292 K/UL (ref 150–400)
PMV BLD AUTO: 9.5 FL (ref 8.9–12.9)
POTASSIUM SERPL-SCNC: 4 MMOL/L (ref 3.5–5.1)
RBC # BLD AUTO: 3.89 M/UL (ref 4.1–5.7)
SODIUM SERPL-SCNC: 146 MMOL/L (ref 136–145)
WBC # BLD AUTO: 7.2 K/UL (ref 4.1–11.1)

## 2025-07-29 PROCEDURE — 6370000000 HC RX 637 (ALT 250 FOR IP): Performed by: UROLOGY

## 2025-07-29 PROCEDURE — 2500000003 HC RX 250 WO HCPCS: Performed by: UROLOGY

## 2025-07-29 PROCEDURE — 6370000000 HC RX 637 (ALT 250 FOR IP)

## 2025-07-29 PROCEDURE — 2580000003 HC RX 258

## 2025-07-29 PROCEDURE — 6360000002 HC RX W HCPCS

## 2025-07-29 PROCEDURE — 80048 BASIC METABOLIC PNL TOTAL CA: CPT

## 2025-07-29 PROCEDURE — 2500000003 HC RX 250 WO HCPCS

## 2025-07-29 PROCEDURE — 1100000000 HC RM PRIVATE

## 2025-07-29 PROCEDURE — 94761 N-INVAS EAR/PLS OXIMETRY MLT: CPT

## 2025-07-29 PROCEDURE — 85025 COMPLETE CBC W/AUTO DIFF WBC: CPT

## 2025-07-29 PROCEDURE — 6360000002 HC RX W HCPCS: Performed by: UROLOGY

## 2025-07-29 RX ORDER — LEVOFLOXACIN 500 MG/1
750 TABLET, FILM COATED ORAL EVERY 24 HOURS
Status: DISCONTINUED | OUTPATIENT
Start: 2025-07-30 | End: 2025-08-01

## 2025-07-29 RX ADMIN — TRIAMCINOLONE ACETONIDE: 1 OINTMENT TOPICAL at 20:51

## 2025-07-29 RX ADMIN — WATER 1000 MG: 1 INJECTION INTRAMUSCULAR; INTRAVENOUS; SUBCUTANEOUS at 20:49

## 2025-07-29 RX ADMIN — CARBAMAZEPINE 200 MG: 200 CAPSULE, EXTENDED RELEASE ORAL at 20:50

## 2025-07-29 RX ADMIN — CARBAMAZEPINE 200 MG: 200 CAPSULE, EXTENDED RELEASE ORAL at 07:49

## 2025-07-29 RX ADMIN — DIVALPROEX SODIUM 250 MG: 250 TABLET, DELAYED RELEASE ORAL at 20:50

## 2025-07-29 RX ADMIN — TRIAMCINOLONE ACETONIDE: 1 OINTMENT TOPICAL at 07:59

## 2025-07-29 RX ADMIN — SODIUM CHLORIDE, PRESERVATIVE FREE 10 ML: 5 INJECTION INTRAVENOUS at 20:50

## 2025-07-29 RX ADMIN — HYDROMORPHONE HYDROCHLORIDE 0.5 MG: 1 INJECTION, SOLUTION INTRAMUSCULAR; INTRAVENOUS; SUBCUTANEOUS at 20:49

## 2025-07-29 RX ADMIN — ENOXAPARIN SODIUM 40 MG: 100 INJECTION SUBCUTANEOUS at 07:49

## 2025-07-29 RX ADMIN — SODIUM CHLORIDE, SODIUM LACTATE, POTASSIUM CHLORIDE, AND CALCIUM CHLORIDE: .6; .31; .03; .02 INJECTION, SOLUTION INTRAVENOUS at 16:28

## 2025-07-29 RX ADMIN — LEVOFLOXACIN 750 MG: 750 INJECTION, SOLUTION INTRAVENOUS at 12:55

## 2025-07-29 RX ADMIN — SODIUM CHLORIDE, PRESERVATIVE FREE 10 ML: 5 INJECTION INTRAVENOUS at 07:59

## 2025-07-29 RX ADMIN — HYDROMORPHONE HYDROCHLORIDE 0.5 MG: 1 INJECTION, SOLUTION INTRAMUSCULAR; INTRAVENOUS; SUBCUTANEOUS at 13:49

## 2025-07-29 RX ADMIN — DIVALPROEX SODIUM 250 MG: 250 TABLET, DELAYED RELEASE ORAL at 07:48

## 2025-07-29 RX ADMIN — SODIUM CHLORIDE, SODIUM LACTATE, POTASSIUM CHLORIDE, AND CALCIUM CHLORIDE: .6; .31; .03; .02 INJECTION, SOLUTION INTRAVENOUS at 02:02

## 2025-07-29 RX ADMIN — RISPERIDONE 1 MG: 0.25 TABLET, FILM COATED ORAL at 07:49

## 2025-07-29 ASSESSMENT — PAIN SCALES - GENERAL: PAINLEVEL_OUTOF10: 0

## 2025-07-30 LAB
ANION GAP SERPL CALC-SCNC: 11 MMOL/L (ref 2–14)
BACTERIA SPEC CULT: NORMAL
BASOPHILS # BLD: 0.04 K/UL (ref 0–0.1)
BASOPHILS NFR BLD: 0.5 % (ref 0–1)
BUN SERPL-MCNC: 22 MG/DL (ref 8–23)
BUN/CREAT SERPL: 44 (ref 12–20)
CALCIUM SERPL-MCNC: 9.3 MG/DL (ref 8.8–10.2)
CHLORIDE SERPL-SCNC: 106 MMOL/L (ref 98–107)
CO2 SERPL-SCNC: 24 MMOL/L (ref 20–29)
CREAT SERPL-MCNC: 0.49 MG/DL (ref 0.7–1.2)
DIFFERENTIAL METHOD BLD: ABNORMAL
EOSINOPHIL # BLD: 0.12 K/UL (ref 0–0.4)
EOSINOPHIL NFR BLD: 1.4 % (ref 0–7)
ERYTHROCYTE [DISTWIDTH] IN BLOOD BY AUTOMATED COUNT: 11.2 % (ref 11.5–14.5)
GLUCOSE SERPL-MCNC: 103 MG/DL (ref 65–100)
HCT VFR BLD AUTO: 39 % (ref 36.6–50.3)
HGB BLD-MCNC: 12.7 G/DL (ref 12.1–17)
IMM GRANULOCYTES # BLD AUTO: 0.04 K/UL (ref 0–0.04)
IMM GRANULOCYTES NFR BLD AUTO: 0.5 % (ref 0–0.5)
LYMPHOCYTES # BLD: 1.39 K/UL (ref 0.8–3.5)
LYMPHOCYTES NFR BLD: 15.7 % (ref 12–49)
MCH RBC QN AUTO: 32.2 PG (ref 26–34)
MCHC RBC AUTO-ENTMCNC: 32.6 G/DL (ref 30–36.5)
MCV RBC AUTO: 98.7 FL (ref 80–99)
MONOCYTES # BLD: 0.63 K/UL (ref 0–1)
MONOCYTES NFR BLD: 7.1 % (ref 5–13)
NEUTS SEG # BLD: 6.61 K/UL (ref 1.8–8)
NEUTS SEG NFR BLD: 74.8 % (ref 32–75)
NRBC # BLD: 0 K/UL (ref 0–0.01)
NRBC BLD-RTO: 0 PER 100 WBC
PLATELET # BLD AUTO: 298 K/UL (ref 150–400)
PMV BLD AUTO: 9.9 FL (ref 8.9–12.9)
POTASSIUM SERPL-SCNC: 3.9 MMOL/L (ref 3.5–5.1)
RBC # BLD AUTO: 3.95 M/UL (ref 4.1–5.7)
SERVICE CMNT-IMP: NORMAL
SODIUM SERPL-SCNC: 141 MMOL/L (ref 136–145)
WBC # BLD AUTO: 8.8 K/UL (ref 4.1–11.1)

## 2025-07-30 PROCEDURE — 6370000000 HC RX 637 (ALT 250 FOR IP): Performed by: UROLOGY

## 2025-07-30 PROCEDURE — 80048 BASIC METABOLIC PNL TOTAL CA: CPT

## 2025-07-30 PROCEDURE — 6360000002 HC RX W HCPCS

## 2025-07-30 PROCEDURE — 85025 COMPLETE CBC W/AUTO DIFF WBC: CPT

## 2025-07-30 PROCEDURE — 94761 N-INVAS EAR/PLS OXIMETRY MLT: CPT

## 2025-07-30 PROCEDURE — 36415 COLL VENOUS BLD VENIPUNCTURE: CPT

## 2025-07-30 PROCEDURE — 6370000000 HC RX 637 (ALT 250 FOR IP)

## 2025-07-30 PROCEDURE — 2580000003 HC RX 258

## 2025-07-30 PROCEDURE — 2500000003 HC RX 250 WO HCPCS: Performed by: UROLOGY

## 2025-07-30 PROCEDURE — 6360000002 HC RX W HCPCS: Performed by: UROLOGY

## 2025-07-30 PROCEDURE — 1100000000 HC RM PRIVATE

## 2025-07-30 RX ADMIN — AMPICILLIN SODIUM 2000 MG: 2 INJECTION, POWDER, FOR SOLUTION INTRAMUSCULAR; INTRAVENOUS at 11:57

## 2025-07-30 RX ADMIN — DIVALPROEX SODIUM 250 MG: 250 TABLET, DELAYED RELEASE ORAL at 21:49

## 2025-07-30 RX ADMIN — AMPICILLIN SODIUM 2000 MG: 2 INJECTION, POWDER, FOR SOLUTION INTRAMUSCULAR; INTRAVENOUS at 17:01

## 2025-07-30 RX ADMIN — SODIUM CHLORIDE, PRESERVATIVE FREE 10 ML: 5 INJECTION INTRAVENOUS at 21:51

## 2025-07-30 RX ADMIN — SODIUM CHLORIDE, PRESERVATIVE FREE 10 ML: 5 INJECTION INTRAVENOUS at 10:07

## 2025-07-30 RX ADMIN — DIVALPROEX SODIUM 250 MG: 250 TABLET, DELAYED RELEASE ORAL at 10:07

## 2025-07-30 RX ADMIN — CARBAMAZEPINE 200 MG: 200 CAPSULE, EXTENDED RELEASE ORAL at 21:49

## 2025-07-30 RX ADMIN — TRIAMCINOLONE ACETONIDE: 1 OINTMENT TOPICAL at 21:50

## 2025-07-30 RX ADMIN — SODIUM CHLORIDE, SODIUM LACTATE, POTASSIUM CHLORIDE, AND CALCIUM CHLORIDE: .6; .31; .03; .02 INJECTION, SOLUTION INTRAVENOUS at 18:06

## 2025-07-30 RX ADMIN — CARBAMAZEPINE 200 MG: 200 CAPSULE, EXTENDED RELEASE ORAL at 10:07

## 2025-07-30 RX ADMIN — AMPICILLIN SODIUM 2000 MG: 2 INJECTION, POWDER, FOR SOLUTION INTRAMUSCULAR; INTRAVENOUS at 23:00

## 2025-07-30 RX ADMIN — RISPERIDONE 1 MG: 0.25 TABLET, FILM COATED ORAL at 10:07

## 2025-07-30 RX ADMIN — TRIAMCINOLONE ACETONIDE: 1 OINTMENT TOPICAL at 10:07

## 2025-07-30 RX ADMIN — ENOXAPARIN SODIUM 40 MG: 100 INJECTION SUBCUTANEOUS at 10:07

## 2025-07-30 RX ADMIN — LEVOFLOXACIN 750 MG: 500 TABLET, FILM COATED ORAL at 11:50

## 2025-07-31 LAB
ANION GAP SERPL CALC-SCNC: 10 MMOL/L (ref 2–14)
BASOPHILS # BLD: 0.04 K/UL (ref 0–0.1)
BASOPHILS NFR BLD: 0.5 % (ref 0–1)
BUN SERPL-MCNC: 19 MG/DL (ref 8–23)
BUN/CREAT SERPL: 36 (ref 12–20)
CALCIUM SERPL-MCNC: 8.9 MG/DL (ref 8.8–10.2)
CHLORIDE SERPL-SCNC: 108 MMOL/L (ref 98–107)
CO2 SERPL-SCNC: 26 MMOL/L (ref 20–29)
CREAT SERPL-MCNC: 0.52 MG/DL (ref 0.7–1.2)
DIFFERENTIAL METHOD BLD: ABNORMAL
EOSINOPHIL # BLD: 0.22 K/UL (ref 0–0.4)
EOSINOPHIL NFR BLD: 2.9 % (ref 0–7)
ERYTHROCYTE [DISTWIDTH] IN BLOOD BY AUTOMATED COUNT: 11.2 % (ref 11.5–14.5)
GLUCOSE SERPL-MCNC: 125 MG/DL (ref 65–100)
HCT VFR BLD AUTO: 38.4 % (ref 36.6–50.3)
HGB BLD-MCNC: 12.4 G/DL (ref 12.1–17)
IMM GRANULOCYTES # BLD AUTO: 0.02 K/UL (ref 0–0.04)
IMM GRANULOCYTES NFR BLD AUTO: 0.3 % (ref 0–0.5)
LYMPHOCYTES # BLD: 1.62 K/UL (ref 0.8–3.5)
LYMPHOCYTES NFR BLD: 21 % (ref 12–49)
MCH RBC QN AUTO: 31.9 PG (ref 26–34)
MCHC RBC AUTO-ENTMCNC: 32.3 G/DL (ref 30–36.5)
MCV RBC AUTO: 98.7 FL (ref 80–99)
MONOCYTES # BLD: 0.68 K/UL (ref 0–1)
MONOCYTES NFR BLD: 8.8 % (ref 5–13)
NEUTS SEG # BLD: 5.12 K/UL (ref 1.8–8)
NEUTS SEG NFR BLD: 66.5 % (ref 32–75)
NRBC # BLD: 0 K/UL (ref 0–0.01)
NRBC BLD-RTO: 0 PER 100 WBC
PLATELET # BLD AUTO: 300 K/UL (ref 150–400)
PMV BLD AUTO: 9.2 FL (ref 8.9–12.9)
POTASSIUM SERPL-SCNC: 4.2 MMOL/L (ref 3.5–5.1)
RBC # BLD AUTO: 3.89 M/UL (ref 4.1–5.7)
SODIUM SERPL-SCNC: 143 MMOL/L (ref 136–145)
WBC # BLD AUTO: 7.7 K/UL (ref 4.1–11.1)

## 2025-07-31 PROCEDURE — 2580000003 HC RX 258

## 2025-07-31 PROCEDURE — 94761 N-INVAS EAR/PLS OXIMETRY MLT: CPT

## 2025-07-31 PROCEDURE — 1100000000 HC RM PRIVATE

## 2025-07-31 PROCEDURE — 6370000000 HC RX 637 (ALT 250 FOR IP): Performed by: UROLOGY

## 2025-07-31 PROCEDURE — 6370000000 HC RX 637 (ALT 250 FOR IP)

## 2025-07-31 PROCEDURE — 85025 COMPLETE CBC W/AUTO DIFF WBC: CPT

## 2025-07-31 PROCEDURE — 2500000003 HC RX 250 WO HCPCS: Performed by: UROLOGY

## 2025-07-31 PROCEDURE — 80048 BASIC METABOLIC PNL TOTAL CA: CPT

## 2025-07-31 PROCEDURE — 6360000002 HC RX W HCPCS

## 2025-07-31 PROCEDURE — 6360000002 HC RX W HCPCS: Performed by: UROLOGY

## 2025-07-31 RX ADMIN — AMPICILLIN SODIUM 2000 MG: 2 INJECTION, POWDER, FOR SOLUTION INTRAMUSCULAR; INTRAVENOUS at 22:50

## 2025-07-31 RX ADMIN — TRIAMCINOLONE ACETONIDE: 1 OINTMENT TOPICAL at 20:54

## 2025-07-31 RX ADMIN — DIVALPROEX SODIUM 250 MG: 250 TABLET, DELAYED RELEASE ORAL at 08:25

## 2025-07-31 RX ADMIN — SODIUM CHLORIDE, PRESERVATIVE FREE 10 ML: 5 INJECTION INTRAVENOUS at 20:54

## 2025-07-31 RX ADMIN — ENOXAPARIN SODIUM 40 MG: 100 INJECTION SUBCUTANEOUS at 08:26

## 2025-07-31 RX ADMIN — LEVOFLOXACIN 750 MG: 500 TABLET, FILM COATED ORAL at 13:23

## 2025-07-31 RX ADMIN — AMPICILLIN SODIUM 2000 MG: 2 INJECTION, POWDER, FOR SOLUTION INTRAMUSCULAR; INTRAVENOUS at 11:26

## 2025-07-31 RX ADMIN — SODIUM CHLORIDE, SODIUM LACTATE, POTASSIUM CHLORIDE, AND CALCIUM CHLORIDE: .6; .31; .03; .02 INJECTION, SOLUTION INTRAVENOUS at 18:56

## 2025-07-31 RX ADMIN — SODIUM CHLORIDE, SODIUM LACTATE, POTASSIUM CHLORIDE, AND CALCIUM CHLORIDE: .6; .31; .03; .02 INJECTION, SOLUTION INTRAVENOUS at 06:04

## 2025-07-31 RX ADMIN — DIVALPROEX SODIUM 250 MG: 250 TABLET, DELAYED RELEASE ORAL at 20:54

## 2025-07-31 RX ADMIN — AMPICILLIN SODIUM 2000 MG: 2 INJECTION, POWDER, FOR SOLUTION INTRAMUSCULAR; INTRAVENOUS at 04:59

## 2025-07-31 RX ADMIN — RISPERIDONE 1 MG: 0.25 TABLET, FILM COATED ORAL at 08:25

## 2025-07-31 RX ADMIN — TRIAMCINOLONE ACETONIDE: 1 OINTMENT TOPICAL at 08:26

## 2025-07-31 RX ADMIN — CARBAMAZEPINE 200 MG: 200 CAPSULE, EXTENDED RELEASE ORAL at 20:54

## 2025-07-31 RX ADMIN — AMPICILLIN SODIUM 2000 MG: 2 INJECTION, POWDER, FOR SOLUTION INTRAMUSCULAR; INTRAVENOUS at 17:34

## 2025-07-31 RX ADMIN — CARBAMAZEPINE 200 MG: 200 CAPSULE, EXTENDED RELEASE ORAL at 08:25

## 2025-08-01 LAB
ANION GAP SERPL CALC-SCNC: 9 MMOL/L (ref 2–14)
BACTERIA SPEC CULT: ABNORMAL
BASOPHILS # BLD: 0.05 K/UL (ref 0–0.1)
BASOPHILS NFR BLD: 0.7 % (ref 0–1)
BUN SERPL-MCNC: 11 MG/DL (ref 8–23)
BUN/CREAT SERPL: 27 (ref 12–20)
CALCIUM SERPL-MCNC: 8.9 MG/DL (ref 8.8–10.2)
CHLORIDE SERPL-SCNC: 105 MMOL/L (ref 98–107)
CO2 SERPL-SCNC: 25 MMOL/L (ref 20–29)
CREAT SERPL-MCNC: 0.42 MG/DL (ref 0.7–1.2)
DIFFERENTIAL METHOD BLD: ABNORMAL
EOSINOPHIL # BLD: 0.32 K/UL (ref 0–0.4)
EOSINOPHIL NFR BLD: 4.2 % (ref 0–7)
ERYTHROCYTE [DISTWIDTH] IN BLOOD BY AUTOMATED COUNT: 11.3 % (ref 11.5–14.5)
GLUCOSE SERPL-MCNC: 95 MG/DL (ref 65–100)
GRAM STN SPEC: ABNORMAL
GRAM STN SPEC: ABNORMAL
HCT VFR BLD AUTO: 37.7 % (ref 36.6–50.3)
HGB BLD-MCNC: 12.3 G/DL (ref 12.1–17)
IMM GRANULOCYTES # BLD AUTO: 0.03 K/UL (ref 0–0.04)
IMM GRANULOCYTES NFR BLD AUTO: 0.4 % (ref 0–0.5)
LYMPHOCYTES # BLD: 1.52 K/UL (ref 0.8–3.5)
LYMPHOCYTES NFR BLD: 19.8 % (ref 12–49)
MCH RBC QN AUTO: 31.5 PG (ref 26–34)
MCHC RBC AUTO-ENTMCNC: 32.6 G/DL (ref 30–36.5)
MCV RBC AUTO: 96.4 FL (ref 80–99)
MONOCYTES # BLD: 0.6 K/UL (ref 0–1)
MONOCYTES NFR BLD: 7.8 % (ref 5–13)
NEUTS SEG # BLD: 5.14 K/UL (ref 1.8–8)
NEUTS SEG NFR BLD: 67.1 % (ref 32–75)
NRBC # BLD: 0 K/UL (ref 0–0.01)
NRBC BLD-RTO: 0 PER 100 WBC
PLATELET # BLD AUTO: 280 K/UL (ref 150–400)
PMV BLD AUTO: 9.9 FL (ref 8.9–12.9)
POTASSIUM SERPL-SCNC: 4 MMOL/L (ref 3.5–5.1)
RBC # BLD AUTO: 3.91 M/UL (ref 4.1–5.7)
SERVICE CMNT-IMP: ABNORMAL
SODIUM SERPL-SCNC: 139 MMOL/L (ref 136–145)
WBC # BLD AUTO: 7.7 K/UL (ref 4.1–11.1)

## 2025-08-01 PROCEDURE — 6360000002 HC RX W HCPCS

## 2025-08-01 PROCEDURE — 80048 BASIC METABOLIC PNL TOTAL CA: CPT

## 2025-08-01 PROCEDURE — 6370000000 HC RX 637 (ALT 250 FOR IP): Performed by: UROLOGY

## 2025-08-01 PROCEDURE — 6370000000 HC RX 637 (ALT 250 FOR IP)

## 2025-08-01 PROCEDURE — 2500000003 HC RX 250 WO HCPCS: Performed by: UROLOGY

## 2025-08-01 PROCEDURE — 85025 COMPLETE CBC W/AUTO DIFF WBC: CPT

## 2025-08-01 PROCEDURE — 1100000000 HC RM PRIVATE

## 2025-08-01 PROCEDURE — 6360000002 HC RX W HCPCS: Performed by: UROLOGY

## 2025-08-01 PROCEDURE — 2580000003 HC RX 258

## 2025-08-01 PROCEDURE — 94761 N-INVAS EAR/PLS OXIMETRY MLT: CPT

## 2025-08-01 RX ADMIN — AMPICILLIN SODIUM 2000 MG: 2 INJECTION, POWDER, FOR SOLUTION INTRAMUSCULAR; INTRAVENOUS at 04:49

## 2025-08-01 RX ADMIN — TRIAMCINOLONE ACETONIDE: 1 OINTMENT TOPICAL at 20:44

## 2025-08-01 RX ADMIN — SODIUM CHLORIDE, PRESERVATIVE FREE 10 ML: 5 INJECTION INTRAVENOUS at 10:23

## 2025-08-01 RX ADMIN — DIVALPROEX SODIUM 250 MG: 250 TABLET, DELAYED RELEASE ORAL at 20:42

## 2025-08-01 RX ADMIN — ENOXAPARIN SODIUM 40 MG: 100 INJECTION SUBCUTANEOUS at 10:22

## 2025-08-01 RX ADMIN — AMPICILLIN SODIUM 2000 MG: 2 INJECTION, POWDER, FOR SOLUTION INTRAMUSCULAR; INTRAVENOUS at 10:33

## 2025-08-01 RX ADMIN — RISPERIDONE 1 MG: 0.25 TABLET, FILM COATED ORAL at 10:22

## 2025-08-01 RX ADMIN — TRIAMCINOLONE ACETONIDE: 1 OINTMENT TOPICAL at 10:23

## 2025-08-01 RX ADMIN — CARBAMAZEPINE 200 MG: 200 CAPSULE, EXTENDED RELEASE ORAL at 20:42

## 2025-08-01 RX ADMIN — CARBAMAZEPINE 200 MG: 200 CAPSULE, EXTENDED RELEASE ORAL at 10:22

## 2025-08-01 RX ADMIN — DIVALPROEX SODIUM 250 MG: 250 TABLET, DELAYED RELEASE ORAL at 10:22

## 2025-08-01 RX ADMIN — SODIUM CHLORIDE, PRESERVATIVE FREE 10 ML: 5 INJECTION INTRAVENOUS at 20:44

## 2025-08-01 RX ADMIN — AMPICILLIN SODIUM 2000 MG: 2 INJECTION, POWDER, FOR SOLUTION INTRAMUSCULAR; INTRAVENOUS at 23:30

## 2025-08-01 RX ADMIN — AMPICILLIN SODIUM 2000 MG: 2 INJECTION, POWDER, FOR SOLUTION INTRAMUSCULAR; INTRAVENOUS at 17:18

## 2025-08-02 PROCEDURE — 1100000000 HC RM PRIVATE

## 2025-08-02 PROCEDURE — 6370000000 HC RX 637 (ALT 250 FOR IP): Performed by: UROLOGY

## 2025-08-02 PROCEDURE — 2500000003 HC RX 250 WO HCPCS: Performed by: UROLOGY

## 2025-08-02 PROCEDURE — 94761 N-INVAS EAR/PLS OXIMETRY MLT: CPT

## 2025-08-02 PROCEDURE — 6360000002 HC RX W HCPCS

## 2025-08-02 PROCEDURE — 6360000002 HC RX W HCPCS: Performed by: UROLOGY

## 2025-08-02 PROCEDURE — 2580000003 HC RX 258

## 2025-08-02 PROCEDURE — 6370000000 HC RX 637 (ALT 250 FOR IP)

## 2025-08-02 RX ADMIN — AMPICILLIN SODIUM 2000 MG: 2 INJECTION, POWDER, FOR SOLUTION INTRAMUSCULAR; INTRAVENOUS at 06:29

## 2025-08-02 RX ADMIN — DIVALPROEX SODIUM 250 MG: 250 TABLET, DELAYED RELEASE ORAL at 08:19

## 2025-08-02 RX ADMIN — AMPICILLIN SODIUM 2000 MG: 2 INJECTION, POWDER, FOR SOLUTION INTRAMUSCULAR; INTRAVENOUS at 22:45

## 2025-08-02 RX ADMIN — AMPICILLIN SODIUM 2000 MG: 2 INJECTION, POWDER, FOR SOLUTION INTRAMUSCULAR; INTRAVENOUS at 16:55

## 2025-08-02 RX ADMIN — CARBAMAZEPINE 200 MG: 200 CAPSULE, EXTENDED RELEASE ORAL at 08:18

## 2025-08-02 RX ADMIN — ENOXAPARIN SODIUM 40 MG: 100 INJECTION SUBCUTANEOUS at 08:20

## 2025-08-02 RX ADMIN — AMPICILLIN SODIUM 2000 MG: 2 INJECTION, POWDER, FOR SOLUTION INTRAMUSCULAR; INTRAVENOUS at 11:43

## 2025-08-02 RX ADMIN — DIVALPROEX SODIUM 250 MG: 250 TABLET, DELAYED RELEASE ORAL at 21:30

## 2025-08-02 RX ADMIN — RISPERIDONE 1 MG: 0.25 TABLET, FILM COATED ORAL at 08:18

## 2025-08-02 RX ADMIN — SODIUM CHLORIDE, PRESERVATIVE FREE 10 ML: 5 INJECTION INTRAVENOUS at 08:21

## 2025-08-02 RX ADMIN — TRIAMCINOLONE ACETONIDE: 1 OINTMENT TOPICAL at 08:20

## 2025-08-02 RX ADMIN — CARBAMAZEPINE 200 MG: 200 CAPSULE, EXTENDED RELEASE ORAL at 21:30

## 2025-08-02 RX ADMIN — TRIAMCINOLONE ACETONIDE: 1 OINTMENT TOPICAL at 22:37

## 2025-08-02 RX ADMIN — SODIUM CHLORIDE, PRESERVATIVE FREE 10 ML: 5 INJECTION INTRAVENOUS at 21:34

## 2025-08-03 PROCEDURE — 2500000003 HC RX 250 WO HCPCS: Performed by: UROLOGY

## 2025-08-03 PROCEDURE — 6360000002 HC RX W HCPCS: Performed by: UROLOGY

## 2025-08-03 PROCEDURE — 94761 N-INVAS EAR/PLS OXIMETRY MLT: CPT

## 2025-08-03 PROCEDURE — 1100000000 HC RM PRIVATE

## 2025-08-03 PROCEDURE — 2580000003 HC RX 258

## 2025-08-03 PROCEDURE — 6360000002 HC RX W HCPCS

## 2025-08-03 PROCEDURE — 6370000000 HC RX 637 (ALT 250 FOR IP)

## 2025-08-03 PROCEDURE — 6370000000 HC RX 637 (ALT 250 FOR IP): Performed by: UROLOGY

## 2025-08-03 PROCEDURE — 6370000000 HC RX 637 (ALT 250 FOR IP): Performed by: HOSPITALIST

## 2025-08-03 RX ORDER — POLYETHYLENE GLYCOL 3350 17 G/17G
17 POWDER, FOR SOLUTION ORAL DAILY
Status: DISCONTINUED | OUTPATIENT
Start: 2025-08-03 | End: 2025-08-05 | Stop reason: HOSPADM

## 2025-08-03 RX ADMIN — DIVALPROEX SODIUM 250 MG: 250 TABLET, DELAYED RELEASE ORAL at 21:10

## 2025-08-03 RX ADMIN — DIVALPROEX SODIUM 250 MG: 250 TABLET, DELAYED RELEASE ORAL at 07:54

## 2025-08-03 RX ADMIN — TRIAMCINOLONE ACETONIDE: 1 OINTMENT TOPICAL at 21:11

## 2025-08-03 RX ADMIN — SODIUM CHLORIDE, PRESERVATIVE FREE 10 ML: 5 INJECTION INTRAVENOUS at 21:10

## 2025-08-03 RX ADMIN — SODIUM CHLORIDE, PRESERVATIVE FREE 10 ML: 5 INJECTION INTRAVENOUS at 07:54

## 2025-08-03 RX ADMIN — CARBAMAZEPINE 200 MG: 200 CAPSULE, EXTENDED RELEASE ORAL at 07:54

## 2025-08-03 RX ADMIN — POLYETHYLENE GLYCOL 3350 17 G: 17 POWDER, FOR SOLUTION ORAL at 12:00

## 2025-08-03 RX ADMIN — AMPICILLIN SODIUM 2000 MG: 2 INJECTION, POWDER, FOR SOLUTION INTRAMUSCULAR; INTRAVENOUS at 16:44

## 2025-08-03 RX ADMIN — ENOXAPARIN SODIUM 40 MG: 100 INJECTION SUBCUTANEOUS at 07:53

## 2025-08-03 RX ADMIN — AMPICILLIN SODIUM 2000 MG: 2 INJECTION, POWDER, FOR SOLUTION INTRAMUSCULAR; INTRAVENOUS at 12:02

## 2025-08-03 RX ADMIN — CARBAMAZEPINE 200 MG: 200 CAPSULE, EXTENDED RELEASE ORAL at 21:10

## 2025-08-03 RX ADMIN — TRIAMCINOLONE ACETONIDE: 1 OINTMENT TOPICAL at 07:54

## 2025-08-03 RX ADMIN — RISPERIDONE 1 MG: 0.25 TABLET, FILM COATED ORAL at 09:19

## 2025-08-03 RX ADMIN — AMPICILLIN SODIUM 2000 MG: 2 INJECTION, POWDER, FOR SOLUTION INTRAMUSCULAR; INTRAVENOUS at 04:51

## 2025-08-03 ASSESSMENT — PAIN SCALES - GENERAL: PAINLEVEL_OUTOF10: 0

## 2025-08-04 ENCOUNTER — APPOINTMENT (OUTPATIENT)
Facility: HOSPITAL | Age: 65
DRG: 501 | End: 2025-08-04
Payer: MEDICAID

## 2025-08-04 LAB
ANION GAP SERPL CALC-SCNC: 11 MMOL/L (ref 2–14)
BASOPHILS # BLD: 0.03 K/UL (ref 0–0.1)
BASOPHILS NFR BLD: 0.3 % (ref 0–1)
BUN SERPL-MCNC: 18 MG/DL (ref 8–23)
BUN/CREAT SERPL: 41 (ref 12–20)
CALCIUM SERPL-MCNC: 9 MG/DL (ref 8.8–10.2)
CHLORIDE SERPL-SCNC: 107 MMOL/L (ref 98–107)
CO2 SERPL-SCNC: 22 MMOL/L (ref 20–29)
CREAT SERPL-MCNC: 0.43 MG/DL (ref 0.7–1.2)
DIFFERENTIAL METHOD BLD: ABNORMAL
EOSINOPHIL # BLD: 0.11 K/UL (ref 0–0.4)
EOSINOPHIL NFR BLD: 1 % (ref 0–7)
ERYTHROCYTE [DISTWIDTH] IN BLOOD BY AUTOMATED COUNT: 11.4 % (ref 11.5–14.5)
GLUCOSE SERPL-MCNC: 109 MG/DL (ref 65–100)
HCT VFR BLD AUTO: 37.8 % (ref 36.6–50.3)
HGB BLD-MCNC: 12.3 G/DL (ref 12.1–17)
IMM GRANULOCYTES # BLD AUTO: 0.05 K/UL (ref 0–0.04)
IMM GRANULOCYTES NFR BLD AUTO: 0.4 % (ref 0–0.5)
LYMPHOCYTES # BLD: 1.81 K/UL (ref 0.8–3.5)
LYMPHOCYTES NFR BLD: 16.2 % (ref 12–49)
MCH RBC QN AUTO: 31.4 PG (ref 26–34)
MCHC RBC AUTO-ENTMCNC: 32.5 G/DL (ref 30–36.5)
MCV RBC AUTO: 96.4 FL (ref 80–99)
MONOCYTES # BLD: 0.98 K/UL (ref 0–1)
MONOCYTES NFR BLD: 8.8 % (ref 5–13)
NEUTS SEG # BLD: 8.16 K/UL (ref 1.8–8)
NEUTS SEG NFR BLD: 73.3 % (ref 32–75)
NRBC # BLD: 0 K/UL (ref 0–0.01)
NRBC BLD-RTO: 0 PER 100 WBC
PLATELET # BLD AUTO: 311 K/UL (ref 150–400)
PMV BLD AUTO: 10.6 FL (ref 8.9–12.9)
POTASSIUM SERPL-SCNC: 4 MMOL/L (ref 3.5–5.1)
RBC # BLD AUTO: 3.92 M/UL (ref 4.1–5.7)
SODIUM SERPL-SCNC: 140 MMOL/L (ref 136–145)
WBC # BLD AUTO: 11.1 K/UL (ref 4.1–11.1)

## 2025-08-04 PROCEDURE — 6360000002 HC RX W HCPCS

## 2025-08-04 PROCEDURE — 6360000002 HC RX W HCPCS: Performed by: UROLOGY

## 2025-08-04 PROCEDURE — 2500000003 HC RX 250 WO HCPCS: Performed by: UROLOGY

## 2025-08-04 PROCEDURE — 1100000000 HC RM PRIVATE

## 2025-08-04 PROCEDURE — 85025 COMPLETE CBC W/AUTO DIFF WBC: CPT

## 2025-08-04 PROCEDURE — 6370000000 HC RX 637 (ALT 250 FOR IP)

## 2025-08-04 PROCEDURE — 80048 BASIC METABOLIC PNL TOTAL CA: CPT

## 2025-08-04 PROCEDURE — 74018 RADEX ABDOMEN 1 VIEW: CPT

## 2025-08-04 PROCEDURE — 6370000000 HC RX 637 (ALT 250 FOR IP): Performed by: UROLOGY

## 2025-08-04 PROCEDURE — 6370000000 HC RX 637 (ALT 250 FOR IP): Performed by: HOSPITALIST

## 2025-08-04 PROCEDURE — 2580000003 HC RX 258

## 2025-08-04 PROCEDURE — 94761 N-INVAS EAR/PLS OXIMETRY MLT: CPT

## 2025-08-04 RX ORDER — LEVOFLOXACIN 500 MG/1
750 TABLET, FILM COATED ORAL DAILY
Status: DISCONTINUED | OUTPATIENT
Start: 2025-08-04 | End: 2025-08-05 | Stop reason: HOSPADM

## 2025-08-04 RX ORDER — BISACODYL 5 MG/1
10 TABLET, DELAYED RELEASE ORAL DAILY
Status: DISCONTINUED | OUTPATIENT
Start: 2025-08-04 | End: 2025-08-05 | Stop reason: HOSPADM

## 2025-08-04 RX ADMIN — AMPICILLIN SODIUM 2000 MG: 2 INJECTION, POWDER, FOR SOLUTION INTRAMUSCULAR; INTRAVENOUS at 07:55

## 2025-08-04 RX ADMIN — SODIUM CHLORIDE, PRESERVATIVE FREE 10 ML: 5 INJECTION INTRAVENOUS at 21:26

## 2025-08-04 RX ADMIN — LEVOFLOXACIN 750 MG: 500 TABLET, FILM COATED ORAL at 13:14

## 2025-08-04 RX ADMIN — TRIAMCINOLONE ACETONIDE: 1 OINTMENT TOPICAL at 21:26

## 2025-08-04 RX ADMIN — CARBAMAZEPINE 200 MG: 200 CAPSULE, EXTENDED RELEASE ORAL at 21:11

## 2025-08-04 RX ADMIN — SODIUM CHLORIDE, PRESERVATIVE FREE 10 ML: 5 INJECTION INTRAVENOUS at 07:53

## 2025-08-04 RX ADMIN — DIVALPROEX SODIUM 250 MG: 250 TABLET, DELAYED RELEASE ORAL at 21:11

## 2025-08-04 RX ADMIN — POLYETHYLENE GLYCOL 3350 17 G: 17 POWDER, FOR SOLUTION ORAL at 07:52

## 2025-08-04 RX ADMIN — BISACODYL 10 MG: 5 TABLET, COATED ORAL at 13:30

## 2025-08-04 RX ADMIN — RISPERIDONE 1 MG: 0.25 TABLET, FILM COATED ORAL at 07:52

## 2025-08-04 RX ADMIN — TRIAMCINOLONE ACETONIDE: 1 OINTMENT TOPICAL at 07:52

## 2025-08-04 RX ADMIN — AMPICILLIN SODIUM 2000 MG: 2 INJECTION, POWDER, FOR SOLUTION INTRAMUSCULAR; INTRAVENOUS at 01:42

## 2025-08-04 RX ADMIN — CARBAMAZEPINE 200 MG: 200 CAPSULE, EXTENDED RELEASE ORAL at 07:52

## 2025-08-04 RX ADMIN — DIVALPROEX SODIUM 250 MG: 250 TABLET, DELAYED RELEASE ORAL at 07:52

## 2025-08-04 RX ADMIN — ENOXAPARIN SODIUM 40 MG: 100 INJECTION SUBCUTANEOUS at 07:52

## 2025-08-04 ASSESSMENT — PAIN SCALES - GENERAL
PAINLEVEL_OUTOF10: 0
PAINLEVEL_OUTOF10: 0

## 2025-08-05 VITALS
HEART RATE: 68 BPM | OXYGEN SATURATION: 97 % | TEMPERATURE: 97.9 F | WEIGHT: 123 LBS | HEIGHT: 64 IN | DIASTOLIC BLOOD PRESSURE: 80 MMHG | RESPIRATION RATE: 17 BRPM | BODY MASS INDEX: 21 KG/M2 | SYSTOLIC BLOOD PRESSURE: 133 MMHG

## 2025-08-05 PROCEDURE — 94761 N-INVAS EAR/PLS OXIMETRY MLT: CPT

## 2025-08-05 PROCEDURE — 6360000002 HC RX W HCPCS: Performed by: UROLOGY

## 2025-08-05 PROCEDURE — 6370000000 HC RX 637 (ALT 250 FOR IP)

## 2025-08-05 PROCEDURE — 6370000000 HC RX 637 (ALT 250 FOR IP): Performed by: UROLOGY

## 2025-08-05 PROCEDURE — 2500000003 HC RX 250 WO HCPCS: Performed by: UROLOGY

## 2025-08-05 PROCEDURE — 6370000000 HC RX 637 (ALT 250 FOR IP): Performed by: HOSPITALIST

## 2025-08-05 RX ORDER — LEVOFLOXACIN 750 MG/1
750 TABLET, FILM COATED ORAL DAILY
Qty: 4 TABLET | Refills: 0 | Status: SHIPPED | OUTPATIENT
Start: 2025-08-06 | End: 2025-08-10

## 2025-08-05 RX ORDER — BISACODYL 5 MG/1
10 TABLET, DELAYED RELEASE ORAL DAILY
Qty: 10 TABLET | Refills: 0 | Status: SHIPPED | OUTPATIENT
Start: 2025-08-06

## 2025-08-05 RX ADMIN — CARBAMAZEPINE 200 MG: 200 CAPSULE, EXTENDED RELEASE ORAL at 08:13

## 2025-08-05 RX ADMIN — DIVALPROEX SODIUM 250 MG: 250 TABLET, DELAYED RELEASE ORAL at 08:09

## 2025-08-05 RX ADMIN — ENOXAPARIN SODIUM 40 MG: 100 INJECTION SUBCUTANEOUS at 08:08

## 2025-08-05 RX ADMIN — LEVOFLOXACIN 750 MG: 500 TABLET, FILM COATED ORAL at 08:09

## 2025-08-05 RX ADMIN — SODIUM CHLORIDE, PRESERVATIVE FREE 10 ML: 5 INJECTION INTRAVENOUS at 08:08

## 2025-08-05 RX ADMIN — POLYETHYLENE GLYCOL 3350 17 G: 17 POWDER, FOR SOLUTION ORAL at 08:12

## 2025-08-05 RX ADMIN — RISPERIDONE 1 MG: 0.25 TABLET, FILM COATED ORAL at 08:09

## 2025-08-05 RX ADMIN — BISACODYL 10 MG: 5 TABLET, COATED ORAL at 08:09

## 2025-08-05 RX ADMIN — TRIAMCINOLONE ACETONIDE: 1 OINTMENT TOPICAL at 08:07

## 2025-08-05 ASSESSMENT — PAIN SCALES - GENERAL: PAINLEVEL_OUTOF10: 0

## 2025-09-02 ENCOUNTER — OFFICE VISIT (OUTPATIENT)
Age: 65
End: 2025-09-02
Payer: MEDICAID

## 2025-09-02 VITALS — WEIGHT: 123 LBS | BODY MASS INDEX: 21 KG/M2 | HEIGHT: 64 IN

## 2025-09-02 DIAGNOSIS — G40.209 LOCALIZATION-RELATED (FOCAL) (PARTIAL) SYMPTOMATIC EPILEPSY AND EPILEPTIC SYNDROMES WITH COMPLEX PARTIAL SEIZURES, NOT INTRACTABLE, WITHOUT STATUS EPILEPTICUS (HCC): Primary | ICD-10-CM

## 2025-09-02 DIAGNOSIS — G81.91 RIGHT HEMIPARESIS (HCC): ICD-10-CM

## 2025-09-02 DIAGNOSIS — G93.49 STATIC ENCEPHALOPATHY: ICD-10-CM

## 2025-09-02 PROCEDURE — 99214 OFFICE O/P EST MOD 30 MIN: CPT | Performed by: PSYCHIATRY & NEUROLOGY

## 2025-09-02 PROCEDURE — 1123F ACP DISCUSS/DSCN MKR DOCD: CPT | Performed by: PSYCHIATRY & NEUROLOGY

## 2025-09-02 RX ORDER — DEXTROMETHORPHAN HYDROBROMIDE AND PROMETHAZINE HYDROCHLORIDE 15; 6.25 MG/5ML; MG/5ML
5 SYRUP ORAL 4 TIMES DAILY PRN
COMMUNITY

## 2025-09-02 RX ORDER — DIVALPROEX SODIUM 500 MG/1
500 TABLET, DELAYED RELEASE ORAL 2 TIMES DAILY
Status: SHIPPED | COMMUNITY
Start: 2025-09-02

## 2025-09-02 RX ORDER — CARBAMAZEPINE 100 MG/1
100 CAPSULE, EXTENDED RELEASE ORAL 2 TIMES DAILY
COMMUNITY

## 2025-09-02 RX ORDER — BENZONATATE 100 MG/1
100 CAPSULE ORAL 3 TIMES DAILY PRN
COMMUNITY

## 2025-09-02 RX ORDER — RISPERIDONE 1 MG/1
1 TABLET ORAL
Status: SHIPPED | COMMUNITY
Start: 2025-09-02

## (undated) DEVICE — SOLUTION IRRIG 1000ML 09% SOD CHL USP PIC PLAS CONTAINER

## (undated) DEVICE — EVACUATOR SMOKE L 10 FT SMOKE MANAGEMENT EXTENDED EDGE ELECTRODE STERILE DISP

## (undated) DEVICE — SOLUTION IRRIG 1000ML H2O PIC PLAS SHATTERPROOF CONTAINER

## (undated) DEVICE — PAD ABD W5XL9IN GEN USE NONADHESIVE EXTRA ABSRB SFT

## (undated) DEVICE — TOWEL SURG W17XL27IN BLU COT STD PREWASHED DELINTED 4 PER STRL PK

## (undated) DEVICE — BLADE SURG 15 TWIN BK CARBON STL STRL CISION LF DISP

## (undated) DEVICE — NEEDLE SAFETY MONOJECT 25GX1-1/2

## (undated) DEVICE — GLOVE ORANGE PI 8   MSG9080

## (undated) DEVICE — SUPPORT SCROT L FOR 39-44IN WAIST NYL CONSTANT COMFORTABLE

## (undated) DEVICE — TRAY SKIN SCRUB W/4 COMPARTMENT

## (undated) DEVICE — SPONGE GZ W6XL6.75IN COT 6 PLY SUP FLUF EXTRA ABSRB FOR PRE

## (undated) DEVICE — Device